# Patient Record
Sex: FEMALE | Race: WHITE | NOT HISPANIC OR LATINO | Employment: OTHER | URBAN - METROPOLITAN AREA
[De-identification: names, ages, dates, MRNs, and addresses within clinical notes are randomized per-mention and may not be internally consistent; named-entity substitution may affect disease eponyms.]

---

## 2023-03-08 LAB
C REACTIVE PROTEIN (MG/L) IN SER/PLAS BY HIGH SENSIT: 0.3 MG/L
CALCIDIOL (25 OH VITAMIN D3) (NG/ML) IN SER/PLAS: 66 NG/ML
COBALAMIN (VITAMIN B12) (PG/ML) IN SER/PLAS: 254 PG/ML (ref 211–911)
FOLATE (NG/ML) IN SER/PLAS: 11.5 NG/ML
MAGNESIUM (MG/DL) IN SER/PLAS: 1.76 MG/DL (ref 1.6–2.4)
SEDIMENTATION RATE, ERYTHROCYTE: 4 MM/H (ref 0–30)

## 2023-04-07 ENCOUNTER — APPOINTMENT (OUTPATIENT)
Dept: LAB | Facility: LAB | Age: 63
End: 2023-04-07
Payer: MEDICAID

## 2023-04-07 LAB
ALANINE AMINOTRANSFERASE (SGPT) (U/L) IN SER/PLAS: 27 U/L (ref 7–45)
ALBUMIN (G/DL) IN SER/PLAS: 3.5 G/DL (ref 3.4–5)
ALKALINE PHOSPHATASE (U/L) IN SER/PLAS: 63 U/L (ref 33–136)
ANION GAP IN SER/PLAS: 12 MMOL/L (ref 10–20)
ASPARTATE AMINOTRANSFERASE (SGOT) (U/L) IN SER/PLAS: 24 U/L (ref 9–39)
BILIRUBIN TOTAL (MG/DL) IN SER/PLAS: 0.6 MG/DL (ref 0–1.2)
CALCIUM (MG/DL) IN SER/PLAS: 9.4 MG/DL (ref 8.6–10.6)
CARBON DIOXIDE, TOTAL (MMOL/L) IN SER/PLAS: 31 MMOL/L (ref 21–32)
CHLORIDE (MMOL/L) IN SER/PLAS: 103 MMOL/L (ref 98–107)
CHOLESTEROL (MG/DL) IN SER/PLAS: 147 MG/DL (ref 0–199)
CHOLESTEROL IN HDL (MG/DL) IN SER/PLAS: 65.4 MG/DL
CHOLESTEROL/HDL RATIO: 2.2
CREATININE (MG/DL) IN SER/PLAS: 0.75 MG/DL (ref 0.5–1.05)
ERYTHROCYTE DISTRIBUTION WIDTH (RATIO) BY AUTOMATED COUNT: 13.2 % (ref 11.5–14.5)
ERYTHROCYTE MEAN CORPUSCULAR HEMOGLOBIN CONCENTRATION (G/DL) BY AUTOMATED: 31.3 G/DL (ref 32–36)
ERYTHROCYTE MEAN CORPUSCULAR VOLUME (FL) BY AUTOMATED COUNT: 95 FL (ref 80–100)
ERYTHROCYTES (10*6/UL) IN BLOOD BY AUTOMATED COUNT: 4.88 X10E12/L (ref 4–5.2)
ESTIMATED AVERAGE GLUCOSE FOR HBA1C: 108 MG/DL
GFR FEMALE: 90 ML/MIN/1.73M2
GLUCOSE (MG/DL) IN SER/PLAS: 95 MG/DL (ref 74–99)
HEMATOCRIT (%) IN BLOOD BY AUTOMATED COUNT: 46.6 % (ref 36–46)
HEMOGLOBIN (G/DL) IN BLOOD: 14.6 G/DL (ref 12–16)
HEMOGLOBIN A1C/HEMOGLOBIN TOTAL IN BLOOD: 5.4 %
LDL: 62 MG/DL (ref 0–99)
LEUKOCYTES (10*3/UL) IN BLOOD BY AUTOMATED COUNT: 5 X10E9/L (ref 4.4–11.3)
NRBC (PER 100 WBCS) BY AUTOMATED COUNT: 0 /100 WBC (ref 0–0)
PLATELETS (10*3/UL) IN BLOOD AUTOMATED COUNT: 226 X10E9/L (ref 150–450)
POTASSIUM (MMOL/L) IN SER/PLAS: 4.8 MMOL/L (ref 3.5–5.3)
PROTEIN TOTAL: 5.5 G/DL (ref 6.4–8.2)
SODIUM (MMOL/L) IN SER/PLAS: 141 MMOL/L (ref 136–145)
TRIGLYCERIDE (MG/DL) IN SER/PLAS: 97 MG/DL (ref 0–149)
UREA NITROGEN (MG/DL) IN SER/PLAS: 6 MG/DL (ref 6–23)
VLDL: 19 MG/DL (ref 0–40)

## 2023-05-01 ENCOUNTER — HOSPITAL ENCOUNTER (OUTPATIENT)
Dept: DATA CONVERSION | Facility: HOSPITAL | Age: 63
End: 2023-05-01
Attending: ANESTHESIOLOGY | Admitting: ANESTHESIOLOGY
Payer: MEDICAID

## 2023-05-01 DIAGNOSIS — I10 ESSENTIAL (PRIMARY) HYPERTENSION: ICD-10-CM

## 2023-05-01 DIAGNOSIS — M25.552 PAIN IN LEFT HIP: ICD-10-CM

## 2023-05-01 DIAGNOSIS — Z88.2 ALLERGY STATUS TO SULFONAMIDES: ICD-10-CM

## 2023-05-01 DIAGNOSIS — E78.5 HYPERLIPIDEMIA, UNSPECIFIED: ICD-10-CM

## 2023-05-01 DIAGNOSIS — M16.12 UNILATERAL PRIMARY OSTEOARTHRITIS, LEFT HIP: ICD-10-CM

## 2023-06-26 ENCOUNTER — HOSPITAL ENCOUNTER (OUTPATIENT)
Dept: DATA CONVERSION | Facility: HOSPITAL | Age: 63
End: 2023-06-26
Attending: ANESTHESIOLOGY
Payer: MEDICAID

## 2023-06-26 DIAGNOSIS — G89.29 OTHER CHRONIC PAIN: ICD-10-CM

## 2023-06-26 DIAGNOSIS — M48.061 SPINAL STENOSIS, LUMBAR REGION WITHOUT NEUROGENIC CLAUDICATION: ICD-10-CM

## 2023-06-26 DIAGNOSIS — M51.16 INTERVERTEBRAL DISC DISORDERS WITH RADICULOPATHY, LUMBAR REGION: ICD-10-CM

## 2023-06-26 DIAGNOSIS — M54.16 RADICULOPATHY, LUMBAR REGION: ICD-10-CM

## 2023-06-26 DIAGNOSIS — M54.9 DORSALGIA, UNSPECIFIED: ICD-10-CM

## 2023-09-21 RX ORDER — VIT C/E/ZN/COPPR/LUTEIN/ZEAXAN 250MG-90MG
CAPSULE ORAL
COMMUNITY
End: 2023-12-05 | Stop reason: ALTCHOICE

## 2023-09-21 RX ORDER — ASPIRIN 81 MG/1
TABLET ORAL
COMMUNITY
Start: 2022-11-29

## 2023-09-21 RX ORDER — ATORVASTATIN CALCIUM 80 MG/1
80 TABLET, FILM COATED ORAL NIGHTLY
COMMUNITY

## 2023-09-21 RX ORDER — CHLORHEXIDINE GLUCONATE 4 %
LIQUID (ML) TOPICAL
COMMUNITY
Start: 2022-10-31 | End: 2023-12-05 | Stop reason: ALTCHOICE

## 2023-09-21 RX ORDER — CHOLECALCIFEROL (VITAMIN D3) 50 MCG
TABLET ORAL
COMMUNITY

## 2023-09-21 RX ORDER — ACETAMINOPHEN 325 MG/1
TABLET ORAL
COMMUNITY
Start: 2022-11-29 | End: 2023-12-05 | Stop reason: ALTCHOICE

## 2023-09-21 RX ORDER — CHLORHEXIDINE GLUCONATE ORAL RINSE 1.2 MG/ML
SOLUTION DENTAL
COMMUNITY
Start: 2022-10-31 | End: 2023-12-05 | Stop reason: ALTCHOICE

## 2023-10-02 NOTE — OP NOTE
Post Operative Note:     Post-Procedure Diagnosis: Lumbar spinal stenosis,  disc disease, radiculitis   Procedure: 1.   2.   3.   4.   5.   Surgeon: Catrachito   Resident/Fellow/Other Assistant: Alexandro   Estimated Blood Loss (mL): none   Specimen: no   Findings: None     Operative Report Dictated:  Dictation: not applicable - note contains Operative  Report   Operative Report:    Preoperative diagnosis:  Lumbar radiculitis  Postoperative diagnosis:  Lumbar radiculitis, stenosis, disc disease  Procedure: Bilateral L4 lumbar transforaminal epidural steroid injection under fluoroscopic guidance  Surgeon: Miriam Winston  Assistant:  Fellow  Anesthesia: Local, IV sedation  Complications: Apparently none    Clinical note: Cynthia is a 62-year-old female with a history of back and leg symptoms.  She is here today for the aforementioned procedure.  We updated her MRI which does show rather significant  narrowing at L4-5.  She is here today for more targeted injection.    Procedure note: The patient was met in the preoperative holding area after risks benefits and alternatives to procedure were discussed with the patient, informed consent was obtained. Patient brought back to the procedure room and placed in the prone  position on the fluoroscopy table. Area over the back was exposed, prepped, draped, in the usual sterile fashion.  Skin and subcutaneous tissues to the neuroforamen was anesthetized using 0.5% lidocaine.  22-gauge Sprotte needles were inserted in the  skin and advanced into the foramen. Needle tip position was confirmed in AP oblique and lateral view.  Contrast was injected which showed appropriate epidural spread, no intravascular or intrathecal uptake. A total of 2 mL of 0.5% lidocaine mixed with  10 mg dexamethasone was injected in divided doses among the 2 needles. Needles removed, bandage applied, patient tolerated the procedure well with no immediate complications.      Attestation:   Note  Completion:  Attending Attestation I was present for the entire procedure         Electronic Signatures:  Miriam Winston)  (Signed 26-Jun-2023 08:56)   Authored: Post Operative Note, Note Completion      Last Updated: 26-Jun-2023 08:56 by Miriam Winston)

## 2023-10-02 NOTE — OP NOTE
Post Operative Note:     Post-Procedure Diagnosis: Left hip osteoarthritis   Procedure: 1.   2.   3.   4.   5.   Surgeon: Catrachito   Resident/Fellow/Other Assistant: Pascale   Estimated Blood Loss (mL): none   Specimen: no   Findings: None     Operative Report Dictated:  Dictation: not applicable - note contains Operative  Report   Operative Report:    Preoperative diagnosis:  Hip pain/OA  Postoperative diagnosis: Hip pain/OA  Procedure: Left-sided hip joint injection under fluoroscopic guidance  Surgeon: Miriam Winston  Assistant:  Fellow  Anesthesia: Local, IV sedation 2 mg  Complications: Apparently none    Clinical note: Cynthia is a 62-year-old female with a history of left-sided hip pain who presents today for the aforementioned procedure.    Procedure note: The patient was met in the preoperative holding area after risks benefits and alternatives to procedure were discussed with the patient, informed consent was obtained. Patient brought back to the procedure room and placed in the lateral  decubitus position on the fluoroscopy table. Area over the hip joint was exposed, prepped, draped, in the usual sterile fashion.  Skin and subcutaneous tissues to the joint was anesthetized using 0.5% lidocaine.  23-gauge spinal needle was inserted in  the skin and advanced.  Contrast was injected which showed appropriate spread, no intravascular uptake. A total of 3 mL of bupivacaine mixed with 40 mg of methylprednisolone. Needle removed, bandage applied, patient tolerated the procedure well with no  immediate complications.      Attestation:   Note Completion:  Attending Attestation I was present for the entire procedure         Electronic Signatures:  Miriam Winston)  (Signed 01-May-2023 09:59)   Authored: Post Operative Note, Note Completion      Last Updated: 01-May-2023 09:59 by Miriam Winston)

## 2023-10-18 DIAGNOSIS — M06.9 RHEUMATOID ARTHRITIS INVOLVING MULTIPLE SITES, UNSPECIFIED WHETHER RHEUMATOID FACTOR PRESENT (MULTI): Primary | ICD-10-CM

## 2023-10-18 RX ORDER — LEFLUNOMIDE 20 MG/1
TABLET ORAL
COMMUNITY
Start: 2022-11-29 | End: 2023-10-18 | Stop reason: SDUPTHER

## 2023-10-19 RX ORDER — LEFLUNOMIDE 20 MG/1
TABLET ORAL
Qty: 90 TABLET | Refills: 2 | Status: SHIPPED | OUTPATIENT
Start: 2023-10-19

## 2023-10-20 PROBLEM — M47.816 LUMBAR SPONDYLOSIS: Status: ACTIVE | Noted: 2023-10-20

## 2023-10-20 PROBLEM — H57.813 BROW PTOSIS, BILATERAL: Status: ACTIVE | Noted: 2023-10-20

## 2023-10-20 PROBLEM — S92.909A: Status: ACTIVE | Noted: 2023-10-20

## 2023-10-20 PROBLEM — K21.9 GERD (GASTROESOPHAGEAL REFLUX DISEASE): Status: ACTIVE | Noted: 2023-10-20

## 2023-10-20 PROBLEM — R76.8 POSITIVE SM/RNP ANTIBODY: Status: ACTIVE | Noted: 2023-10-20

## 2023-10-20 PROBLEM — H52.13 MYOPIA, BILATERAL: Status: ACTIVE | Noted: 2023-10-20

## 2023-10-20 PROBLEM — D22.9 MULTIPLE NEVI: Status: ACTIVE | Noted: 2023-10-20

## 2023-10-20 PROBLEM — M35.00 SICCA SYNDROME (MULTI): Status: ACTIVE | Noted: 2023-10-20

## 2023-10-20 PROBLEM — N92.6 IRREGULAR MENSES: Status: ACTIVE | Noted: 2023-10-20

## 2023-10-20 PROBLEM — M35.00 SJOGRENS SYNDROME (MULTI): Status: ACTIVE | Noted: 2022-11-17

## 2023-10-20 PROBLEM — G89.29 CHRONIC BACK PAIN: Status: ACTIVE | Noted: 2023-10-20

## 2023-10-20 PROBLEM — D22.5 MELANOCYTIC NEVI OF TRUNK: Status: ACTIVE | Noted: 2019-03-12

## 2023-10-20 PROBLEM — M62.838 MUSCLE SPASM: Status: ACTIVE | Noted: 2023-10-20

## 2023-10-20 PROBLEM — D22.39 MELANOCYTIC NEVI OF OTHER PARTS OF FACE: Status: ACTIVE | Noted: 2019-03-12

## 2023-10-20 PROBLEM — M21.41 ACQUIRED PES PLANUS OF RIGHT FOOT: Status: ACTIVE | Noted: 2023-10-20

## 2023-10-20 PROBLEM — E78.2 MIXED HYPERLIPIDEMIA: Status: ACTIVE | Noted: 2023-10-20

## 2023-10-20 PROBLEM — G47.00 INSOMNIA: Status: ACTIVE | Noted: 2023-10-20

## 2023-10-20 PROBLEM — H52.203 ASTIGMATISM OF BOTH EYES: Status: ACTIVE | Noted: 2023-10-20

## 2023-10-20 PROBLEM — S82.843A BIMALLEOLAR FRACTURE: Status: ACTIVE | Noted: 2023-10-20

## 2023-10-20 PROBLEM — I25.10 CORONARY ARTERIOSCLEROSIS IN NATIVE ARTERY: Status: ACTIVE | Noted: 2023-10-20

## 2023-10-20 PROBLEM — R07.9 CHEST PAIN: Status: ACTIVE | Noted: 2023-10-20

## 2023-10-20 PROBLEM — H52.4 HYPEROPIA WITH PRESBYOPIA OF BOTH EYES: Status: ACTIVE | Noted: 2023-10-20

## 2023-10-20 PROBLEM — H90.11 CONDUCTIVE HEARING LOSS OF RIGHT EAR WITH UNRESTRICTED HEARING OF LEFT EAR: Status: ACTIVE | Noted: 2023-10-20

## 2023-10-20 PROBLEM — I21.9 MYOCARDIAL INFARCT (MULTI): Status: ACTIVE | Noted: 2022-11-17

## 2023-10-20 PROBLEM — M35.89 MIXED COLLAGEN VASCULAR DISEASE (MULTI): Status: ACTIVE | Noted: 2023-10-20

## 2023-10-20 PROBLEM — M48.07 LUMBOSACRAL STENOSIS: Status: ACTIVE | Noted: 2023-10-20

## 2023-10-20 PROBLEM — M06.9 RHEUMATOID ARTHRITIS (MULTI): Status: ACTIVE | Noted: 2022-11-17

## 2023-10-20 PROBLEM — R21 RASH AND OTHER NONSPECIFIC SKIN ERUPTION: Status: ACTIVE | Noted: 2019-03-12

## 2023-10-20 PROBLEM — D49.2 NEOPLASM OF UNSPECIFIED BEHAVIOR OF BONE, SOFT TISSUE, AND SKIN: Status: ACTIVE | Noted: 2019-03-12

## 2023-10-20 PROBLEM — Z95.5 HISTORY OF HEART ARTERY STENT: Status: ACTIVE | Noted: 2023-10-20

## 2023-10-20 PROBLEM — H04.123 DRY EYE SYNDROME OF BILATERAL LACRIMAL GLANDS: Status: ACTIVE | Noted: 2023-10-20

## 2023-10-20 PROBLEM — E78.5 HYPERLIPIDEMIA: Status: ACTIVE | Noted: 2023-10-20

## 2023-10-20 PROBLEM — M43.10 ACQUIRED SPONDYLOLISTHESIS: Status: ACTIVE | Noted: 2023-10-20

## 2023-10-20 PROBLEM — I21.4 NSTEMI (NON-ST ELEVATED MYOCARDIAL INFARCTION) (MULTI): Status: ACTIVE | Noted: 2023-10-20

## 2023-10-20 PROBLEM — R30.0 DYSURIA: Status: ACTIVE | Noted: 2023-10-20

## 2023-10-20 PROBLEM — M77.02 MEDIAL EPICONDYLITIS OF LEFT ELBOW: Status: ACTIVE | Noted: 2023-10-20

## 2023-10-20 PROBLEM — M16.12 ARTHRITIS OF LEFT HIP: Status: ACTIVE | Noted: 2023-10-20

## 2023-10-20 PROBLEM — H65.90 FLUID COLLECTION OF MIDDLE EAR: Status: ACTIVE | Noted: 2023-10-20

## 2023-10-20 PROBLEM — H52.03 HYPEROPIA WITH PRESBYOPIA OF BOTH EYES: Status: ACTIVE | Noted: 2023-10-20

## 2023-10-20 PROBLEM — R19.5 DARK STOOLS: Status: ACTIVE | Noted: 2023-10-20

## 2023-10-20 PROBLEM — E80.1: Status: ACTIVE | Noted: 2023-10-20

## 2023-10-20 PROBLEM — H02.831 DERMATOCHALASIS OF RIGHT UPPER EYELID: Status: ACTIVE | Noted: 2023-10-20

## 2023-10-20 PROBLEM — M54.17 LUMBOSACRAL NEURITIS: Status: ACTIVE | Noted: 2023-10-20

## 2023-10-20 PROBLEM — R35.0 URINARY FREQUENCY: Status: ACTIVE | Noted: 2023-10-20

## 2023-10-20 PROBLEM — G44.309 POST-TRAUMATIC HEADACHE, NOT INTRACTABLE: Status: ACTIVE | Noted: 2023-10-20

## 2023-10-20 PROBLEM — M54.16 LUMBAR RADICULOPATHY: Status: ACTIVE | Noted: 2022-11-17

## 2023-10-20 PROBLEM — T78.1XXA ALLERGIC REACTION TO FOOD: Status: ACTIVE | Noted: 2023-10-20

## 2023-10-20 PROBLEM — M54.9 CHRONIC BACK PAIN: Status: ACTIVE | Noted: 2023-10-20

## 2023-10-20 PROBLEM — L82.1 OTHER SEBORRHEIC KERATOSIS: Status: ACTIVE | Noted: 2019-03-12

## 2023-10-20 PROBLEM — M35.89 OTHER SPECIFIED SYSTEMIC INVOLVEMENT OF CONNECTIVE TISSUE (MULTI): Status: ACTIVE | Noted: 2023-10-20

## 2023-10-20 PROBLEM — H93.11 SUBJECTIVE TINNITUS, RIGHT: Status: ACTIVE | Noted: 2023-10-20

## 2023-10-20 PROBLEM — H54.7 DECREASED VISUAL ACUITY: Status: ACTIVE | Noted: 2023-10-20

## 2023-10-20 PROBLEM — D18.01 HEMANGIOMA OF SKIN AND SUBCUTANEOUS TISSUE: Status: ACTIVE | Noted: 2019-03-12

## 2023-10-20 PROBLEM — B07.0 PLANTAR WART OF RIGHT FOOT: Status: ACTIVE | Noted: 2023-10-20

## 2023-10-20 PROBLEM — S82.892A ANKLE FRACTURE, LEFT: Status: ACTIVE | Noted: 2023-10-20

## 2023-10-20 PROBLEM — E55.9 VITAMIN D DEFICIENCY: Status: ACTIVE | Noted: 2023-10-20

## 2023-10-20 PROBLEM — M79.7 FIBROMYALGIA: Status: ACTIVE | Noted: 2022-11-17

## 2023-10-20 PROBLEM — J30.2 SEASONAL ALLERGIES: Status: ACTIVE | Noted: 2023-10-20

## 2023-10-20 PROBLEM — H72.91 OTITIS MEDIA OF RIGHT EAR WITH RUPTURE OF TYMPANIC MEMBRANE: Status: ACTIVE | Noted: 2023-10-20

## 2023-10-20 PROBLEM — L91.8 OTHER HYPERTROPHIC DISORDERS OF THE SKIN: Status: ACTIVE | Noted: 2019-03-12

## 2023-10-20 PROBLEM — H02.834 DERMATOCHALASIS OF LEFT UPPER EYELID: Status: ACTIVE | Noted: 2023-10-20

## 2023-10-20 PROBLEM — R79.89 LOW VITAMIN D LEVEL: Status: ACTIVE | Noted: 2023-10-20

## 2023-10-20 PROBLEM — M89.319 CLAVICLE ENLARGEMENT: Status: ACTIVE | Noted: 2023-10-20

## 2023-10-20 PROBLEM — H66.91 OTITIS MEDIA OF RIGHT EAR WITH RUPTURE OF TYMPANIC MEMBRANE: Status: ACTIVE | Noted: 2023-10-20

## 2023-10-20 PROBLEM — M19.079 ARTHRITIS OF MIDFOOT: Status: ACTIVE | Noted: 2023-10-20

## 2023-10-20 PROBLEM — R10.11 RIGHT UPPER QUADRANT ABDOMINAL PAIN OF UNKNOWN ETIOLOGY: Status: ACTIVE | Noted: 2023-10-20

## 2023-10-20 PROBLEM — G89.4 CHRONIC PAIN SYNDROME: Status: ACTIVE | Noted: 2023-10-20

## 2023-10-20 PROBLEM — H02.403 PTOSIS OF BOTH EYELIDS: Status: ACTIVE | Noted: 2023-10-20

## 2023-10-20 PROBLEM — I10 HYPERTENSION: Status: ACTIVE | Noted: 2023-10-20

## 2023-10-20 PROBLEM — Z86.69 HISTORY OF ACUTE OTITIS EXTERNA: Status: ACTIVE | Noted: 2023-10-20

## 2023-10-20 PROBLEM — M54.12 CERVICAL RADICULITIS: Status: ACTIVE | Noted: 2023-10-20

## 2023-10-20 RX ORDER — EPINEPHRINE 0.3 MG/.3ML
0.3 INJECTION SUBCUTANEOUS
COMMUNITY
Start: 2018-03-05 | End: 2024-02-02 | Stop reason: SDUPTHER

## 2023-10-20 RX ORDER — METOPROLOL SUCCINATE 50 MG/1
1 TABLET, EXTENDED RELEASE ORAL DAILY
COMMUNITY
Start: 2019-08-14 | End: 2024-02-26 | Stop reason: SDUPTHER

## 2023-10-20 RX ORDER — LORATADINE 10 MG/1
10 TABLET ORAL DAILY
COMMUNITY
End: 2023-12-05 | Stop reason: ALTCHOICE

## 2023-10-20 RX ORDER — POLYVINYL ALCOHOL 14 MG/ML
1-2 SOLUTION/ DROPS OPHTHALMIC 4 TIMES DAILY
COMMUNITY
Start: 2018-04-11

## 2023-10-20 RX ORDER — TRAZODONE HYDROCHLORIDE 100 MG/1
1-3 TABLET ORAL NIGHTLY
COMMUNITY
End: 2023-12-05 | Stop reason: ALTCHOICE

## 2023-10-20 RX ORDER — DIPHENHYDRAMINE HCL 25 MG
50 TABLET ORAL
COMMUNITY
Start: 2018-02-21 | End: 2023-12-05 | Stop reason: ALTCHOICE

## 2023-10-20 RX ORDER — HYDROCHLOROTHIAZIDE 25 MG/1
25 TABLET ORAL
COMMUNITY
End: 2023-12-05 | Stop reason: ALTCHOICE

## 2023-10-20 RX ORDER — METOPROLOL SUCCINATE 25 MG/1
25 TABLET, EXTENDED RELEASE ORAL
COMMUNITY
End: 2023-12-05 | Stop reason: ALTCHOICE

## 2023-10-20 RX ORDER — PILOCARPINE HYDROCHLORIDE 5 MG/1
1 TABLET, FILM COATED ORAL 2 TIMES DAILY
COMMUNITY
Start: 2016-10-18

## 2023-10-20 RX ORDER — NITROGLYCERIN 0.3 MG/1
TABLET SUBLINGUAL
COMMUNITY
Start: 2019-05-15 | End: 2023-12-05 | Stop reason: ALTCHOICE

## 2023-10-20 RX ORDER — OXYCODONE HYDROCHLORIDE 5 MG/1
5 TABLET ORAL EVERY 6 HOURS PRN
COMMUNITY
Start: 2022-11-28 | End: 2023-12-05 | Stop reason: ALTCHOICE

## 2023-10-20 RX ORDER — PREDNISONE 50 MG/1
50 TABLET ORAL DAILY
COMMUNITY
Start: 2023-09-06 | End: 2023-11-15

## 2023-10-20 RX ORDER — NABUMETONE 500 MG/1
500 TABLET, FILM COATED ORAL 3 TIMES DAILY
COMMUNITY
End: 2023-12-05 | Stop reason: ALTCHOICE

## 2023-10-20 RX ORDER — DOCUSATE SODIUM 50 MG AND SENNOSIDES 8.6 MG 8.6; 5 MG/1; MG/1
2 TABLET, FILM COATED ORAL NIGHTLY
COMMUNITY
Start: 2022-11-28 | End: 2023-12-05 | Stop reason: ALTCHOICE

## 2023-10-20 RX ORDER — FLUOROMETHOLONE 1 MG/ML
1 SUSPENSION/ DROPS OPHTHALMIC 2 TIMES DAILY
COMMUNITY
Start: 2023-04-03

## 2023-10-20 RX ORDER — TRAMADOL HYDROCHLORIDE 50 MG/1
50 TABLET ORAL
COMMUNITY
End: 2023-11-09 | Stop reason: SDUPTHER

## 2023-10-20 RX ORDER — POLYETHYLENE GLYCOL 3350, SODIUM SULFATE ANHYDROUS, SODIUM BICARBONATE, SODIUM CHLORIDE, POTASSIUM CHLORIDE 236; 22.74; 6.74; 5.86; 2.97 G/4L; G/4L; G/4L; G/4L; G/4L
POWDER, FOR SOLUTION ORAL
COMMUNITY
Start: 2022-03-02 | End: 2023-12-05 | Stop reason: ALTCHOICE

## 2023-10-20 RX ORDER — CIPROFLOXACIN 500 MG/1
1 TABLET ORAL EVERY 12 HOURS
COMMUNITY
Start: 2022-10-21 | End: 2023-12-05 | Stop reason: ALTCHOICE

## 2023-10-20 RX ORDER — DULOXETIN HYDROCHLORIDE 60 MG/1
1 CAPSULE, DELAYED RELEASE ORAL 2 TIMES DAILY
COMMUNITY
Start: 2016-09-09 | End: 2024-01-22

## 2023-10-20 RX ORDER — LIFITEGRAST 50 MG/ML
1 SOLUTION/ DROPS OPHTHALMIC 2 TIMES DAILY
COMMUNITY
End: 2023-12-05 | Stop reason: ALTCHOICE

## 2023-10-20 RX ORDER — DOXYCYCLINE 100 MG/1
100 TABLET ORAL 2 TIMES DAILY
COMMUNITY
Start: 2023-09-06 | End: 2023-12-05 | Stop reason: ALTCHOICE

## 2023-10-20 RX ORDER — LEFLUNOMIDE 10 MG/1
20 TABLET ORAL DAILY
COMMUNITY
Start: 2019-01-14 | End: 2023-12-05 | Stop reason: ALTCHOICE

## 2023-10-20 RX ORDER — CLOPIDOGREL BISULFATE 75 MG/1
75 TABLET ORAL
COMMUNITY
End: 2023-12-05 | Stop reason: ALTCHOICE

## 2023-10-20 RX ORDER — PSEUDOEPHEDRINE HCL 30 MG
1 TABLET ORAL EVERY 12 HOURS PRN
COMMUNITY
Start: 2023-06-02 | End: 2023-12-05 | Stop reason: ALTCHOICE

## 2023-10-20 RX ORDER — OMEPRAZOLE 20 MG/1
20 CAPSULE, DELAYED RELEASE ORAL 2 TIMES DAILY
COMMUNITY
End: 2023-12-05 | Stop reason: ALTCHOICE

## 2023-10-20 RX ORDER — PREGABALIN 100 MG/1
1 CAPSULE ORAL 2 TIMES DAILY
COMMUNITY
Start: 2019-08-13 | End: 2023-12-22 | Stop reason: SDUPTHER

## 2023-10-20 RX ORDER — GABAPENTIN 600 MG/1
600 TABLET ORAL 3 TIMES DAILY
COMMUNITY
End: 2023-12-05 | Stop reason: ALTCHOICE

## 2023-10-20 RX ORDER — OMEPRAZOLE 40 MG/1
40 CAPSULE, DELAYED RELEASE ORAL DAILY
COMMUNITY

## 2023-10-20 RX ORDER — DESLORATADINE 5 MG/1
5 TABLET ORAL DAILY PRN
COMMUNITY
Start: 2021-06-11

## 2023-10-20 RX ORDER — CYCLOBENZAPRINE HCL 10 MG
10 TABLET ORAL 3 TIMES DAILY PRN
COMMUNITY
End: 2023-12-05 | Stop reason: ALTCHOICE

## 2023-10-20 RX ORDER — HYDROXYCHLOROQUINE SULFATE 200 MG/1
TABLET, FILM COATED ORAL
COMMUNITY
End: 2023-12-05 | Stop reason: ALTCHOICE

## 2023-10-23 ENCOUNTER — OFFICE VISIT (OUTPATIENT)
Dept: ORTHOPEDIC SURGERY | Facility: HOSPITAL | Age: 63
End: 2023-10-23
Payer: MEDICAID

## 2023-10-23 DIAGNOSIS — M17.11 ARTHRITIS OF RIGHT KNEE: ICD-10-CM

## 2023-10-23 PROCEDURE — 2500000005 HC RX 250 GENERAL PHARMACY W/O HCPCS: Performed by: EMERGENCY MEDICINE

## 2023-10-23 PROCEDURE — 2500000004 HC RX 250 GENERAL PHARMACY W/ HCPCS (ALT 636 FOR OP/ED): Performed by: EMERGENCY MEDICINE

## 2023-10-23 PROCEDURE — 99214 OFFICE O/P EST MOD 30 MIN: CPT | Performed by: EMERGENCY MEDICINE

## 2023-10-23 PROCEDURE — 20610 DRAIN/INJ JOINT/BURSA W/O US: CPT | Mod: RT,GC | Performed by: STUDENT IN AN ORGANIZED HEALTH CARE EDUCATION/TRAINING PROGRAM

## 2023-10-23 PROCEDURE — 99214 OFFICE O/P EST MOD 30 MIN: CPT | Mod: 25 | Performed by: EMERGENCY MEDICINE

## 2023-10-23 RX ADMIN — LIDOCAINE HYDROCHLORIDE 4 ML: 10 INJECTION, SOLUTION INFILTRATION; PERINEURAL at 23:03

## 2023-10-23 RX ADMIN — TRIAMCINOLONE ACETONIDE 80 MG: 40 INJECTION, SUSPENSION INTRA-ARTICULAR; INTRAMUSCULAR at 23:03

## 2023-10-23 ASSESSMENT — ENCOUNTER SYMPTOMS: KNEE SWELLING: 1

## 2023-10-24 NOTE — PROGRESS NOTES
Subjective   Cynthia Cantrell is a 63 y.o. female who presents for Pain of the Right Knee    Right Knee       63-year-old female who has previously seen for left hip pain and right AC joint arthritis presents with new complaint of right knee pain.  States that right knee has been bothering her since she had a fall on Labor Day.  States that she got out of her wheelchair and went to turn, and her foot got stuck and she suffered a valgus injury to the right knee and struck the medial aspect of the right knee onto the ground.  Had immediate pain and the following day went to the ER.  At the ER, x-rays were obtained and demonstrated moderate osteoarthritis with no acute fracture.  She was told to follow-up with orthopedics if her pain did not improve.  She has never had any prior injuries or injections to the right knee.  She is taking tramadol as prescribed by another physician.    ROS: All pertinent positive symptoms are included in the history of present illness.    All other systems have been reviewed and are negative and noncontributory to this patient's current ailments.    Objective     Physical Exam  GENERAL:  Awake, alert, and oriented, no apparent distress, pleasant, and cooperative  PSYC: Mood is euthymic, affect is congruent  EAR, NOSE, THROAT:  Normocephalic, atraumatic, moist membranes, anicteric sclera  LUNG: Nonlabored breathing  HEART: No clubbing or cyanosis  SKIN: No increased erythema, warmth, rashes, or concerning skin lesions  GAIT: Wheelchair assisted  MUSCULOSKELETAL:  The RIGHT knee has trace to grade 1 joint effusion. Patella crepitus and grind are positive. There is minimal tenderness to the medial and lateral joint lines. Flexion and extension are without mechanical blocking. There is no instability with stress testing.    IMAGING:  Radiographs of right knee obtained on 9/5/2023 demonstrate moderate osteoarthritis as demonstrated on nonweightbearing images     PROCEDURES  Patient ID:  Cynthia Cantrell is a 63 y.o. female.    L Inj/Asp: R knee on 10/23/2023 11:03 PM  Indications: pain  Details: 25 G needle, anteromedial approach  Medications: 80 mg triamcinolone acetonide 40 mg/mL; 4 mL lidocaine 10 mg/mL (1 %)  Outcome: tolerated well, no immediate complications  Procedure, treatment alternatives, risks and benefits explained, specific risks discussed. Consent was given by the patient.           Assessment/Plan   Problem List Items Addressed This Visit    None  Visit Diagnoses       Arthritis of right knee        Relevant Orders    Point of Care Ultrasound (Completed)    L Inj/Asp: R knee        We discussed the imaging and examination findings together in office today.  There is no evidence of acute fracture or dislocation on x-rays obtained 9/5/2023.  Symptoms are consistent with a flare of right knee osteoarthritis.  Discussed treatment options, including corticosteroid injection, which patient elected to proceed with today.  Discussed risks versus benefits of having injections performed. Patient has elected to proceed with procedures. Please refer to procedure notes above. Discussed with patient to limit weightbearing activities over the next 24-48 hours, they can then progress to full activities as tolerated. Discussed with patient to avoid water submersion over the next 2 days. Discussed with patient to call me immediately if they develop worsening pain, rash, erythema, or fevers.  She will follow-up as needed.

## 2023-10-25 RX ORDER — TRIAMCINOLONE ACETONIDE 40 MG/ML
80 INJECTION, SUSPENSION INTRA-ARTICULAR; INTRAMUSCULAR
Status: COMPLETED | OUTPATIENT
Start: 2023-10-23 | End: 2023-10-23

## 2023-10-25 RX ORDER — LIDOCAINE HYDROCHLORIDE 10 MG/ML
4 INJECTION INFILTRATION; PERINEURAL
Status: COMPLETED | OUTPATIENT
Start: 2023-10-23 | End: 2023-10-23

## 2023-10-26 ENCOUNTER — OFFICE VISIT (OUTPATIENT)
Dept: PAIN MEDICINE | Facility: HOSPITAL | Age: 63
End: 2023-10-26
Payer: MEDICAID

## 2023-10-26 DIAGNOSIS — M54.16 LUMBAR RADICULOPATHY: ICD-10-CM

## 2023-10-26 DIAGNOSIS — M25.552 PAIN OF LEFT HIP: ICD-10-CM

## 2023-10-26 PROCEDURE — 3074F SYST BP LT 130 MM HG: CPT | Performed by: ANESTHESIOLOGY

## 2023-10-26 PROCEDURE — 3080F DIAST BP >= 90 MM HG: CPT | Performed by: ANESTHESIOLOGY

## 2023-10-26 PROCEDURE — 99214 OFFICE O/P EST MOD 30 MIN: CPT | Performed by: ANESTHESIOLOGY

## 2023-10-26 ASSESSMENT — PAIN SCALES - GENERAL: PAINLEVEL: 7

## 2023-10-26 NOTE — PROGRESS NOTES
Chief Complaint   Patient presents with    Back Pain     Subjective   Patient ID: Cynthia Cantrell is a 63 y.o. female.    HPI    Patient is a 63-year-old female who presents a follow-up visit in pain clinic today for back pain and bilateral radicular pain.  Patient was last seen in the clinic on 6/26/2023, and underwent bilateral L4 transforaminal epidural steroid injections.  Patient also has a history of L3/L5 laminectomy with Dr. Raymundo in November 2022.  Patient states since her last injection, she has noticed resolution of the radiculopathy on the right side, however does have lingering radiculopathy down the left leg.  She typically cannot walk more than across the juan due to her leg pain, and therefore spends most of her activity in her wheelchair.  Takes Lyrica and tramadol with improvement of her symptoms.    Review of Systems  A 13 point comprehensive review of system was negative except for specific complaints as listed in the HPI.    Objective   Physical Exam  PHYSICAL EXAM  Vitals signs reviewed  Constitutional:    General: In wheelchair, not in acute distress   Appearance: Normal appearance. Not ill-appearing.  HENT:   Head: Normocephalic and atraumatic  Eyes:   Conjunctiva/sclera normal  Cardiovascular:  No jugular venous distention bilaterally  No gross edema in lower extremities  Pulmonary:   Effort: No respiratory distress  Abdominal:  Abdomen appears nondistended  Musculoskeletal:   Moves all extremities equally  Leg roll exam positive on the left  Skin:   General: Skin is warm and dry  Neurological:  Straight leg test was negative bilaterally  Patellar reflexes were 2+ bowel  Seema exam was negative bilaterally  Dorsiflexion and plantarflexion 5/5 bilaterally  Psychiatric:    Mood and Affect: Mood normal    Behavior: Behavior normal    Assessment/Plan   Diagnoses and all orders for this visit:  Lumbar radiculopathy  -     Transforaminal; Future  -     FL pain management TC;  Future  Pain of left hip  -     FL guided injection hip left; Future    Patient is a 63-year-old female who presents as a follow-up visit in pain clinic today for bilateral back pain and leg pain.  MRI reviewed from 6/4/2023, most significant for disc extrusion at L4/L5 causing severe central stenosis, however does have spondylolisthesis L2 on L3 causing moderate central stenosis as well as spondylolisthesis L1 on L2 causing moderate central stenosis.  Does also have osteophytes causing severe right-sided neuroforaminal stenosis at L5/S1.  We will schedule for left-sided hip injection, and a subsequent left-sided repeat L4 transforaminal epidural steroid injection.      Also provided patient with information with regarding to spinal cord stimulation.  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    Plan:  - Schedule for left-sided intra-articular hip injection.  - We will schedule for follow-up left-sided L4 transforaminal epidural steroid injection.  - Provided patient with information regarding spinal cord stimulator.  Did discuss the procedure itself as well as risk, benefits, alternatives.  We discussed that the 3 step process for neuromodulation includes evaluation by pain psychology, a trial where she can test it out, and then the permanent implant.  -Urged to keep up with core strengthening and exercise program.      All of the aforementioned procedures including the risk, benefits, alternatives reviewed with the patient.

## 2023-11-09 DIAGNOSIS — M54.16 LUMBAR RADICULOPATHY: ICD-10-CM

## 2023-11-09 DIAGNOSIS — G89.4 CHRONIC PAIN SYNDROME: ICD-10-CM

## 2023-11-10 RX ORDER — NALOXONE HYDROCHLORIDE 4 MG/.1ML
4 SPRAY NASAL AS NEEDED
Qty: 2 EACH | Refills: 0 | Status: SHIPPED | OUTPATIENT
Start: 2023-11-10 | End: 2024-11-09

## 2023-11-10 RX ORDER — TRAMADOL HYDROCHLORIDE 50 MG/1
50 TABLET ORAL EVERY 4 HOURS PRN
Qty: 180 TABLET | Refills: 0 | Status: SHIPPED | OUTPATIENT
Start: 2023-11-10 | End: 2023-12-10

## 2023-11-15 ENCOUNTER — HOSPITAL ENCOUNTER (EMERGENCY)
Facility: HOSPITAL | Age: 63
Discharge: HOME | End: 2023-11-15
Payer: MEDICAID

## 2023-11-15 VITALS
RESPIRATION RATE: 19 BRPM | HEIGHT: 64 IN | SYSTOLIC BLOOD PRESSURE: 123 MMHG | WEIGHT: 136 LBS | BODY MASS INDEX: 23.22 KG/M2 | TEMPERATURE: 97.8 F | OXYGEN SATURATION: 94 % | HEART RATE: 92 BPM | DIASTOLIC BLOOD PRESSURE: 77 MMHG

## 2023-11-15 DIAGNOSIS — R05.1 ACUTE COUGH: Primary | ICD-10-CM

## 2023-11-15 DIAGNOSIS — J18.9 PNEUMONIA DUE TO INFECTIOUS ORGANISM, UNSPECIFIED LATERALITY, UNSPECIFIED PART OF LUNG: ICD-10-CM

## 2023-11-15 PROCEDURE — 99283 EMERGENCY DEPT VISIT LOW MDM: CPT

## 2023-11-15 PROCEDURE — 99285 EMERGENCY DEPT VISIT HI MDM: CPT

## 2023-11-15 RX ORDER — PREDNISONE 20 MG/1
20 TABLET ORAL 2 TIMES DAILY
Qty: 8 TABLET | Refills: 0 | Status: SHIPPED | OUTPATIENT
Start: 2023-11-15 | End: 2023-11-19

## 2023-11-15 RX ORDER — DOXYCYCLINE 100 MG/1
100 CAPSULE ORAL 2 TIMES DAILY
Qty: 20 CAPSULE | Refills: 0 | Status: SHIPPED | OUTPATIENT
Start: 2023-11-15 | End: 2023-11-25

## 2023-11-15 RX ORDER — ALBUTEROL SULFATE 90 UG/1
2 AEROSOL, METERED RESPIRATORY (INHALATION) EVERY 4 HOURS PRN
Qty: 18 G | Refills: 0 | Status: SHIPPED | OUTPATIENT
Start: 2023-11-15 | End: 2024-03-29

## 2023-11-15 RX ORDER — BENZONATATE 100 MG/1
100 CAPSULE ORAL 3 TIMES DAILY PRN
Qty: 20 CAPSULE | Refills: 0 | Status: SHIPPED | OUTPATIENT
Start: 2023-11-15 | End: 2023-11-22

## 2023-11-15 ASSESSMENT — PAIN - FUNCTIONAL ASSESSMENT: PAIN_FUNCTIONAL_ASSESSMENT: 0-10

## 2023-11-15 ASSESSMENT — PAIN DESCRIPTION - DESCRIPTORS: DESCRIPTORS: ACHING

## 2023-11-15 ASSESSMENT — PAIN SCALES - GENERAL: PAINLEVEL_OUTOF10: 7

## 2023-11-15 ASSESSMENT — COLUMBIA-SUICIDE SEVERITY RATING SCALE - C-SSRS
6. HAVE YOU EVER DONE ANYTHING, STARTED TO DO ANYTHING, OR PREPARED TO DO ANYTHING TO END YOUR LIFE?: NO
2. HAVE YOU ACTUALLY HAD ANY THOUGHTS OF KILLING YOURSELF?: NO
1. IN THE PAST MONTH, HAVE YOU WISHED YOU WERE DEAD OR WISHED YOU COULD GO TO SLEEP AND NOT WAKE UP?: NO

## 2023-11-15 ASSESSMENT — PAIN DESCRIPTION - PAIN TYPE: TYPE: CHRONIC PAIN

## 2023-11-16 NOTE — ED PROVIDER NOTES
HPI   Chief Complaint   Patient presents with    Flu Symptoms     Pt arrives to ED with c/o cough, weakness, fever x2 weeks. Pt states she lost 6 pounds in that time. Pt alert and oriented x4. No fever at time of triage. Pt reports taking tylenol prior to arrival.       63-year-old female with cough weakness and fever for the past 2 weeks she lost some weight because she lost her appetite she has been coughing persistently.                          No data recorded                Patient History   Past Medical History:   Diagnosis Date    Encounter for general adult medical examination without abnormal findings 10/29/2018    Encounter for preventive health examination    Fibromyalgia 11/12/2021    Fibromyalgia    Old myocardial infarction 11/16/2020    Old myocardial infarct    Other overlap syndromes (CMS/[facility]) 10/10/2016    Mixed connective tissue disease    Personal history of other diseases of the digestive system     History of esophageal reflux    Personal history of other diseases of the musculoskeletal system and connective tissue     History of arthritis    Personal history of other infectious and parasitic diseases 04/28/2016    History of herpes zoster    Rheumatoid arthritis, unspecified (CMS/[facility]) 05/16/2022    Rheumatoid arthritis    Sjogren syndrome, unspecified (CMS/[facility])     History of Sjogren's disease    Unspecified perforation of tympanic membrane, right ear 07/29/2016    Perforated tympanic membrane, right    Urinary tract infection, site not specified 10/21/2022    Acute lower UTI    Urinary tract infection, site not specified 08/20/2021    Acute lower UTI     Past Surgical History:   Procedure Laterality Date    APPENDECTOMY  09/09/2016    Appendectomy    CT ABDOMEN PELVIS ANGIOGRAM W AND/OR WO IV CONTRAST  4/11/2019    CT ABDOMEN PELVIS ANGIOGRAM W AND/OR WO IV CONTRAST 4/11/2019 [facility] CLINICAL LEGACY    OTHER SURGICAL HISTORY  09/09/2016    Mastoidectomy, Revision - With Tympanoplasty    OTHER  SURGICAL HISTORY  01/07/2021    Foot surgery     Family History   Problem Relation Name Age of Onset    Other (Cardiac abnormality) Mother      Hypothyroidism Mother      Intracerebral hemorrhage Mother       Social History     Tobacco Use    Smoking status: Never    Smokeless tobacco: Not on file   Substance Use Topics    Alcohol use: Not on file    Drug use: Not on file       Physical Exam   ED Triage Vitals [11/15/23 1711]   Temp Heart Rate Resp BP   36.6 °C (97.8 °F) 92 19 123/77      SpO2 Temp src Heart Rate Source Patient Position   94 % -- Monitor Sitting      BP Location FiO2 (%)     Left arm --       Physical Exam  Vitals reviewed.   Constitutional:       General: She is not in acute distress.     Appearance: Normal appearance. She is normal weight. She is not ill-appearing, toxic-appearing or diaphoretic.   HENT:      Head: Normocephalic and atraumatic.      Right Ear: Tympanic membrane and external ear normal.      Left Ear: Tympanic membrane and external ear normal.      Nose: Nose normal.      Mouth/Throat:      Mouth: Mucous membranes are moist.   Eyes:      Extraocular Movements: Extraocular movements intact.      Conjunctiva/sclera: Conjunctivae normal.      Pupils: Pupils are equal, round, and reactive to light.   Cardiovascular:      Rate and Rhythm: Normal rate and regular rhythm.   Pulmonary:      Effort: Pulmonary effort is normal. No respiratory distress.      Breath sounds: No stridor.   Abdominal:      General: There is no distension.      Tenderness: There is no abdominal tenderness. There is no guarding.   Musculoskeletal:         General: No swelling, tenderness, deformity or signs of injury.      Cervical back: Normal range of motion. No rigidity or tenderness.   Skin:     General: Skin is warm.      Capillary Refill: Capillary refill takes less than 2 seconds.      Coloration: Skin is not jaundiced or pale.      Findings: No bruising, erythema or rash.   Neurological:      General: No  focal deficit present.      Mental Status: She is alert and oriented to person, place, and time. Mental status is at baseline.   Psychiatric:         Mood and Affect: Mood normal.         Behavior: Behavior normal.         Thought Content: Thought content normal.         Judgment: Judgment normal.         ED Course & MDM   Diagnoses as of 11/16/23 1010   Pneumonia due to infectious organism, unspecified laterality, unspecified part of lung   Acute cough       Medical Decision Making  Differentials bronchitis pneumonia cough URI    Considered x-ray however since her cough is persisted over the past few weeks will treat with antibiotics, steroid inhaler and antitussive.  We will treat for atypical infection patient will get her medication tonight understands return precautions.        Procedure  Procedures     Ady Mcguire PA-C  11/16/23 1019

## 2023-12-05 ENCOUNTER — LAB (OUTPATIENT)
Dept: LAB | Facility: LAB | Age: 63
End: 2023-12-05
Payer: MEDICAID

## 2023-12-05 ENCOUNTER — OFFICE VISIT (OUTPATIENT)
Dept: CARDIOLOGY | Facility: HOSPITAL | Age: 63
End: 2023-12-05
Payer: MEDICAID

## 2023-12-05 VITALS
WEIGHT: 142 LBS | SYSTOLIC BLOOD PRESSURE: 104 MMHG | OXYGEN SATURATION: 96 % | RESPIRATION RATE: 16 BRPM | HEART RATE: 79 BPM | HEIGHT: 64 IN | BODY MASS INDEX: 24.24 KG/M2 | DIASTOLIC BLOOD PRESSURE: 74 MMHG

## 2023-12-05 DIAGNOSIS — R23.1 PALLOR: ICD-10-CM

## 2023-12-05 DIAGNOSIS — I10 HYPERTENSION, UNSPECIFIED TYPE: ICD-10-CM

## 2023-12-05 DIAGNOSIS — Z95.5 HISTORY OF HEART ARTERY STENT: ICD-10-CM

## 2023-12-05 DIAGNOSIS — I21.4 NSTEMI (NON-ST ELEVATED MYOCARDIAL INFARCTION) (MULTI): ICD-10-CM

## 2023-12-05 DIAGNOSIS — E78.00 PURE HYPERCHOLESTEROLEMIA: ICD-10-CM

## 2023-12-05 LAB
ANION GAP SERPL CALC-SCNC: 14 MMOL/L (ref 10–20)
BUN SERPL-MCNC: 7 MG/DL (ref 6–23)
CALCIUM SERPL-MCNC: 9 MG/DL (ref 8.6–10.3)
CHLORIDE SERPL-SCNC: 101 MMOL/L (ref 98–107)
CO2 SERPL-SCNC: 27 MMOL/L (ref 21–32)
CREAT SERPL-MCNC: 0.77 MG/DL (ref 0.5–1.05)
ERYTHROCYTE [DISTWIDTH] IN BLOOD BY AUTOMATED COUNT: 14.2 % (ref 11.5–14.5)
GFR SERPL CREATININE-BSD FRML MDRD: 87 ML/MIN/1.73M*2
GLUCOSE SERPL-MCNC: 96 MG/DL (ref 74–99)
HCT VFR BLD AUTO: 46.6 % (ref 36–46)
HGB BLD-MCNC: 14.7 G/DL (ref 12–16)
MCH RBC QN AUTO: 29.3 PG (ref 26–34)
MCHC RBC AUTO-ENTMCNC: 31.5 G/DL (ref 32–36)
MCV RBC AUTO: 93 FL (ref 80–100)
NRBC BLD-RTO: 0 /100 WBCS (ref 0–0)
PLATELET # BLD AUTO: 240 X10*3/UL (ref 150–450)
POTASSIUM SERPL-SCNC: 4.7 MMOL/L (ref 3.5–5.3)
RBC # BLD AUTO: 5.01 X10*6/UL (ref 4–5.2)
SODIUM SERPL-SCNC: 137 MMOL/L (ref 136–145)
WBC # BLD AUTO: 6.5 X10*3/UL (ref 4.4–11.3)

## 2023-12-05 PROCEDURE — 3074F SYST BP LT 130 MM HG: CPT | Performed by: INTERNAL MEDICINE

## 2023-12-05 PROCEDURE — 93005 ELECTROCARDIOGRAM TRACING: CPT | Performed by: INTERNAL MEDICINE

## 2023-12-05 PROCEDURE — 99214 OFFICE O/P EST MOD 30 MIN: CPT | Performed by: INTERNAL MEDICINE

## 2023-12-05 PROCEDURE — 99214 OFFICE O/P EST MOD 30 MIN: CPT | Mod: 25,27 | Performed by: INTERNAL MEDICINE

## 2023-12-05 PROCEDURE — 80048 BASIC METABOLIC PNL TOTAL CA: CPT

## 2023-12-05 PROCEDURE — 3078F DIAST BP <80 MM HG: CPT | Performed by: INTERNAL MEDICINE

## 2023-12-05 PROCEDURE — 36415 COLL VENOUS BLD VENIPUNCTURE: CPT

## 2023-12-05 PROCEDURE — 85027 COMPLETE CBC AUTOMATED: CPT

## 2023-12-05 PROCEDURE — 93010 ELECTROCARDIOGRAM REPORT: CPT | Performed by: INTERNAL MEDICINE

## 2023-12-05 NOTE — PROGRESS NOTES
12/05/23  2:04 PM    Cardiovascular Medicine    This terrific 63 year-old woman is sen in f/u of CAD with NSTEMI 4.2019 and LASHAE in RCA (3.5 x 38 mm Bethanie stent) she had mild coronary artery disease elsewhere. She has normal LVEF. She has HTN and hyperlipidemia and mixed connective tissue disorder.     Since I saw her last in the spring, she has continued to loose weight, now >25#.  At this point, she is not eating well; recently had a bad bronchitis. She has continued to have some struggles post spine surgery a year ago. She is working with Dr. Winston.    She denies chest pain or dyspnea. No bleeding on aspirin Rx. Some tachycardia when she accidentally was given a lower metoprolol XL dose by pharmacy, now rectified.    She has an intermittent fullness in her left calf; she is worried this could be vascular. Slight swelling. She wears compression.    Past Medical History:   Diagnosis Date    Encounter for general adult medical examination without abnormal findings 10/29/2018    Encounter for preventive health examination    Fibromyalgia 11/12/2021    Fibromyalgia    Old myocardial infarction 11/16/2020    Old myocardial infarct    Other overlap syndromes (CMS/HCC) 10/10/2016    Mixed connective tissue disease    Personal history of other diseases of the digestive system     History of esophageal reflux    Personal history of other diseases of the musculoskeletal system and connective tissue     History of arthritis    Personal history of other infectious and parasitic diseases 04/28/2016    History of herpes zoster    Rheumatoid arthritis, unspecified (CMS/HCC) 05/16/2022    Rheumatoid arthritis    Sjogren syndrome, unspecified (CMS/[facility])     History of Sjogren's disease    Unspecified perforation of tympanic membrane, right ear 07/29/2016    Perforated tympanic membrane, right    Urinary tract infection, site not specified 10/21/2022    Acute lower UTI    Urinary tract infection, site not specified 08/20/2021     Acute lower UTI     Patient Active Problem List   Diagnosis    Acquired pes planus of right foot    Acquired spondylolisthesis    Allergic reaction to food    Ankle fracture, left    Chest pain    Arthritis of left hip    Arthritis of midfoot    Astigmatism of both eyes    Bimalleolar fracture    Brow ptosis, bilateral    Clavicle enlargement    Conductive hearing loss of right ear with unrestricted hearing of left ear    Coronary arteriosclerosis in native artery    Dark stools    Decreased visual acuity    Dermatochalasis of left upper eyelid    Dermatochalasis of right upper eyelid    Dry eye syndrome of bilateral lacrimal glands    Dysuria    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HCC)    Chronic back pain    Chronic pain syndrome    Fibromyalgia    Fluid collection of middle ear    Fracture of foot, closed    GERD (gastroesophageal reflux disease)    History of acute otitis externa    History of heart artery stent    Hyperlipidemia    Mixed hyperlipidemia    Hypertension    Insomnia    Hyperopia with presbyopia of both eyes    Irregular menses    Low vitamin D level    Lumbar radiculopathy    Lumbar spondylosis    Cervical radiculitis    Lumbosacral neuritis    Lumbosacral stenosis    Medial epicondylitis of left elbow    Melanocytic nevi of trunk    Melanocytic nevi of other parts of face    Hemangioma of skin and subcutaneous tissue    Multiple nevi    Muscle spasm    Myocardial infarct (CMS/HCC)    NSTEMI (non-ST elevated myocardial infarction) (CMS/HCC)    Myopia, bilateral    Neoplasm of unspecified behavior of bone, soft tissue, and skin    Other seborrheic keratosis    Other specified systemic involvement of connective tissue (CMS/HCC)    Otitis media of right ear with rupture of tympanic membrane    Plantar wart of right foot    Positive sm/RNP antibody    Post-traumatic headache, not intractable    Ptosis of both eyelids    Rash and other nonspecific skin eruption    Mixed collagen vascular disease  (CMS/HCC)    Rheumatoid arthritis (CMS/HCC)    Sjogrens syndrome (CMS/HCC)    Right upper quadrant abdominal pain of unknown etiology    Seasonal allergies    Sicca syndrome (CMS/HCC)    Other hypertrophic disorders of the skin    Subjective tinnitus, right    Urinary frequency    Vitamin D deficiency     Current Outpatient Medications   Medication Sig Dispense Refill    abatacept (Orencia, with maltose,) 250 mg injection Infuse into a venous catheter.      albuterol 90 mcg/actuation inhaler Inhale 2 puffs every 4 hours if needed for wheezing. 18 g 0    aspirin 81 mg EC tablet 1 tablet DAILY (route: oral)      atorvastatin (Lipitor) 80 mg tablet Take 1 tablet (80 mg) by mouth once daily at bedtime.      cholecalciferol (Vitamin D-3) 50 MCG (2000 UT) tablet Take by mouth.      desloratadine (Clarinex) 5 mg tablet Take 1 tablet (5 mg) by mouth once daily as needed.      DULoxetine (Cymbalta) 60 mg DR capsule Take 1 capsule (60 mg) by mouth 2 times a day.      EPINEPHrine 0.3 mg/0.3 mL injection syringe Inject 0.3 mL (0.3 mg) into the muscle. As Directed      fluorometholone (FML) 0.1 % ophthalmic suspension Administer 1 drop into both eyes 2 times a day.      leflunomide (Arava) 20 mg tablet 1 tablet DAILY (route: oral) 90 tablet 2    metoprolol succinate XL (Toprol-XL) 50 mg 24 hr tablet Take 1 tablet (50 mg) by mouth once daily.      naloxone (Narcan) 4 mg/0.1 mL nasal spray Administer 1 spray (4 mg) into affected nostril(s) if needed for opioid reversal. May repeat every 2-3 minutes if needed, alternating nostrils, until medical assistance becomes available. 2 each 0    omeprazole (PriLOSEC) 40 mg DR capsule Take 1 capsule (40 mg) by mouth once daily.      pilocarpine (Salagen) 5 mg tablet Take 1 tablet (5 mg) by mouth 2 times a day.      polyvinyl alcohol (Liquifilm Tears) 1.4 % ophthalmic solution Administer 1-2 drops into affected eye(s) 4 times a day.      pregabalin (Lyrica) 100 mg capsule Take 1 capsule (100  "mg) by mouth 2 times a day.      traMADol (Ultram) 50 mg tablet Take 1 tablet (50 mg) by mouth every 4 hours if needed for severe pain (7 - 10). 180 tablet 0     No current facility-administered medications for this visit.     ROS + recent bronchitis    Physical exam  /74 (BP Location: Right arm, Patient Position: Sitting, BP Cuff Size: Adult)   Pulse 79   Resp 16   Ht 1.613 m (5' 3.5\")   Wt 64.4 kg (142 lb)   SpO2 96%   BMI 24.76 kg/m²   She is in no distress  HEENT pallor, pale conjunctive  JVP flat  +S1, S2, RRR; no m/r/g  Clear lungs  Abd soft, benign, no bruits  Brisk pulses at ankle and multiphasic DP/PT signals bilaterally  She is alert, oriented  Fluid speech appropriate affect    Component      Latest Ref Rng 4/7/2023   GLUCOSE      74 - 99 mg/dL 95    SODIUM      136 - 145 mmol/L 141    POTASSIUM      3.5 - 5.3 mmol/L 4.8    CHLORIDE      98 - 107 mmol/L 103    Bicarbonate      21 - 32 mmol/L 31    Anion Gap      10 - 20 mmol/L 12    Blood Urea Nitrogen      6 - 23 mg/dL 6    Creatinine      0.50 - 1.05 mg/dL 0.75    GFR Female      >90 mL/min/1.73m2 90    Calcium      8.6 - 10.6 mg/dL 9.4    Albumin      3.4 - 5.0 g/dL 3.5    Alkaline Phosphatase      33 - 136 U/L 63    Total Protein      6.4 - 8.2 g/dL 5.5 (L)    AST      9 - 39 U/L 24    Bilirubin Total      0.0 - 1.2 mg/dL 0.6    ALT      7 - 45 U/L 27    WBC      4.4 - 11.3 x10E9/L 5.0    nRBC      0.0 - 0.0 /100 WBC 0.0    RBC      4.00 - 5.20 x10E12/L 4.88    HEMOGLOBIN      12.0 - 16.0 g/dL 14.6    HEMATOCRIT      36.0 - 46.0 % 46.6 (H)    MCV      80 - 100 fL 95    MCHC      32.0 - 36.0 g/dL 31.3 (L)    Platelets      150 - 450 x10E9/L 226    RED CELL DISTRIBUTION WIDTH      11.5 - 14.5 % 13.2    CHOLESTEROL      0 - 199 mg/dL 147    HDL CHOLESTEROL      mg/dL 65.4    Cholesterol/HDL Ratio 2.2    LDL Calculated      0 - 99 mg/dL 62    VLDL      0 - 40 mg/dL 19    TRIGLYCERIDES      0 - 149 mg/dL 97    Hemoglobin A1C      % 5.4  "   Estimated Average Glucose      MG/       Today's ECG NSR At 79 beats per minute. Normal intervals/axis. No ischemic changes.     Legend:  (L) Low  (H) High    11.2022 Stress test - Dobutamine Echo, no ischemia  Summary:  1. The resting ejection fraction was estimated at 70 to 75% with a peak exercise ejection fraction estimated at >75%.  2. Hyperdynamic global left ventricular systolic function.  3. No clinical, echocardiographic or electrocardiographic evidence for ischemia at a maximal infusion.  4. No ECG changes from baseline.  5. Normal Stress Test.     [id number] Benton Muro MD  Electronically signed on 11/15/2022 at 6:12:24 PM    Assessment/Plan: 63-year-old woman seen in f/u of CAD. She has a history of drug-eluting stent placement in the right coronary artery in 2019 for NSTEMI. She is now on single agent aspirin. She is doing fine from a cardiac perspective without angina or HF sx. She is tolerating aspirin; no on right dose of metoprolol BP is good.    She is recovering from spine surgery and now bronchitis - she has lost some weight; she will monitor; I would have her not intentionally loose more weight.    She looks pale -- I will check HgB today along with BMP.    For her lipids, our goal LDL is < 70 mg/dL and ideally < 55 mg/dL, she was  62 mg/dL and I would not add meds at this time -- with more weight loss this may well be better. She will continue atorvastatin 80 mg/day and recheck lipids in next few months.    As for left fullness, she has mild edema; prior surgery left ankle.  I don't think she has arterial disease.  No extensive varices> I provided reassurance and she will use compression stockings.    RTC 9 months or sooner PRN.    Rebecca Larson MD

## 2023-12-05 NOTE — LETTER
December 5, 2023     Antonietta Candelario, APRN-CNP  [address]  [address] [address]    Patient: Cynthia Cantrell   YOB: 1960   Date of Visit: 12/5/2023       Dear Dr. Antonietta Candelario, APRN-CNP:    Thank you for referring Cynthia Cantrell to me for evaluation. Below are my notes for this consultation.  If you have questions, please do not hesitate to call me. I look forward to following your patient along with you.       Sincerely,     Rebecca Larson MD      CC: No Recipients  ______________________________________________________________________________________    12/05/23  2:04 PM    Cardiovascular Medicine    This terrific 63 year-old woman is sen in f/u of CAD with NSTEMI 4.2019 and LASHAE in RCA (3.5 x 38 mm Bethanie stent) she had mild coronary artery disease elsewhere. She has normal LVEF. She has HTN and hyperlipidemia and mixed connective tissue disorder.     Since I saw her last in the spring, she has continued to loose weight, now >25#.  At this point, she is not eating well; recently had a bad bronchitis. She has continued to have some struggles post spine surgery a year ago. She is working with Dr. Winston.    She denies chest pain or dyspnea. No bleeding on aspirin Rx. Some tachycardia when she accidentally was given a lower metoprolol XL dose by pharmacy, now rectified.    She has an intermittent fullness in her left calf; she is worried this could be vascular. Slight swelling. She wears compression.    Past Medical History:   Diagnosis Date   • Encounter for general adult medical examination without abnormal findings 10/29/2018    Encounter for preventive health examination   • Fibromyalgia 11/12/2021    Fibromyalgia   • Old myocardial infarction 11/16/2020    Old myocardial infarct   • Other overlap syndromes (CMS/HCC) 10/10/2016    Mixed connective tissue disease   • Personal history of other diseases of the digestive system     History of esophageal  reflux   • Personal history of other diseases of the musculoskeletal system and connective tissue     History of arthritis   • Personal history of other infectious and parasitic diseases 04/28/2016    History of herpes zoster   • Rheumatoid arthritis, unspecified (CMS/HCC) 05/16/2022    Rheumatoid arthritis   • Sjogren syndrome, unspecified (CMS/HCC)     History of Sjogren's disease   • Unspecified perforation of tympanic membrane, right ear 07/29/2016    Perforated tympanic membrane, right   • Urinary tract infection, site not specified 10/21/2022    Acute lower UTI   • Urinary tract infection, site not specified 08/20/2021    Acute lower UTI     Patient Active Problem List   Diagnosis   • Acquired pes planus of right foot   • Acquired spondylolisthesis   • Allergic reaction to food   • Ankle fracture, left   • Chest pain   • Arthritis of left hip   • Arthritis of midfoot   • Astigmatism of both eyes   • Bimalleolar fracture   • Brow ptosis, bilateral   • Clavicle enlargement   • Conductive hearing loss of right ear with unrestricted hearing of left ear   • Coronary arteriosclerosis in native artery   • Dark stools   • Decreased visual acuity   • Dermatochalasis of left upper eyelid   • Dermatochalasis of right upper eyelid   • Dry eye syndrome of bilateral lacrimal glands   • Dysuria   • 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[facility])   • Chronic back pain   • Chronic pain syndrome   • Fibromyalgia   • Fluid collection of middle ear   • Fracture of foot, closed   • GERD (gastroesophageal reflux disease)   • History of acute otitis externa   • History of heart artery stent   • Hyperlipidemia   • Mixed hyperlipidemia   • Hypertension   • Insomnia   • Hyperopia with presbyopia of both eyes   • Irregular menses   • Low vitamin D level   • Lumbar radiculopathy   • Lumbar spondylosis   • Cervical radiculitis   • Lumbosacral neuritis   • Lumbosacral stenosis   • Medial epicondylitis of left elbow   • Melanocytic nevi of trunk    • Melanocytic nevi of other parts of face   • Hemangioma of skin and subcutaneous tissue   • Multiple nevi   • Muscle spasm   • Myocardial infarct (CMS/HCC)   • NSTEMI (non-ST elevated myocardial infarction) (CMS/HCC)   • Myopia, bilateral   • Neoplasm of unspecified behavior of bone, soft tissue, and skin   • Other seborrheic keratosis   • Other specified systemic involvement of connective tissue (CMS/HCC)   • Otitis media of right ear with rupture of tympanic membrane   • Plantar wart of right foot   • Positive sm/RNP antibody   • Post-traumatic headache, not intractable   • Ptosis of both eyelids   • Rash and other nonspecific skin eruption   • Mixed collagen vascular disease (CMS/HCC)   • Rheumatoid arthritis (CMS/HCC)   • Sjogrens syndrome (CMS/HCC)   • Right upper quadrant abdominal pain of unknown etiology   • Seasonal allergies   • Sicca syndrome (CMS/HCC)   • Other hypertrophic disorders of the skin   • Subjective tinnitus, right   • Urinary frequency   • Vitamin D deficiency     Current Outpatient Medications   Medication Sig Dispense Refill   • abatacept (Orencia, with maltose,) 250 mg injection Infuse into a venous catheter.     • albuterol 90 mcg/actuation inhaler Inhale 2 puffs every 4 hours if needed for wheezing. 18 g 0   • aspirin 81 mg EC tablet 1 tablet DAILY (route: oral)     • atorvastatin (Lipitor) 80 mg tablet Take 1 tablet (80 mg) by mouth once daily at bedtime.     • cholecalciferol (Vitamin D-3) 50 MCG (2000 UT) tablet Take by mouth.     • desloratadine (Clarinex) 5 mg tablet Take 1 tablet (5 mg) by mouth once daily as needed.     • DULoxetine (Cymbalta) 60 mg DR capsule Take 1 capsule (60 mg) by mouth 2 times a day.     • EPINEPHrine 0.3 mg/0.3 mL injection syringe Inject 0.3 mL (0.3 mg) into the muscle. As Directed     • fluorometholone (FML) 0.1 % ophthalmic suspension Administer 1 drop into both eyes 2 times a day.     • leflunomide (Arava) 20 mg tablet 1 tablet DAILY (route: oral) 90  "tablet 2   • metoprolol succinate XL (Toprol-XL) 50 mg 24 hr tablet Take 1 tablet (50 mg) by mouth once daily.     • naloxone (Narcan) 4 mg/0.1 mL nasal spray Administer 1 spray (4 mg) into affected nostril(s) if needed for opioid reversal. May repeat every 2-3 minutes if needed, alternating nostrils, until medical assistance becomes available. 2 each 0   • omeprazole (PriLOSEC) 40 mg DR capsule Take 1 capsule (40 mg) by mouth once daily.     • pilocarpine (Salagen) 5 mg tablet Take 1 tablet (5 mg) by mouth 2 times a day.     • polyvinyl alcohol (Liquifilm Tears) 1.4 % ophthalmic solution Administer 1-2 drops into affected eye(s) 4 times a day.     • pregabalin (Lyrica) 100 mg capsule Take 1 capsule (100 mg) by mouth 2 times a day.     • traMADol (Ultram) 50 mg tablet Take 1 tablet (50 mg) by mouth every 4 hours if needed for severe pain (7 - 10). 180 tablet 0     No current facility-administered medications for this visit.     ROS + recent bronchitis    Physical exam  /74 (BP Location: Right arm, Patient Position: Sitting, BP Cuff Size: Adult)   Pulse 79   Resp 16   Ht 1.613 m (5' 3.5\")   Wt 64.4 kg (142 lb)   SpO2 96%   BMI 24.76 kg/m²   She is in no distress  HEENT pallor, pale conjunctive  JVP flat  +S1, S2, RRR; no m/r/g  Clear lungs  Abd soft, benign, no bruits  Brisk pulses at ankle and multiphasic DP/PT signals bilaterally  She is alert, oriented  Fluid speech appropriate affect    Component      Latest Ref Rng 4/7/2023   GLUCOSE      74 - 99 mg/dL 95    SODIUM      136 - 145 mmol/L 141    POTASSIUM      3.5 - 5.3 mmol/L 4.8    CHLORIDE      98 - 107 mmol/L 103    Bicarbonate      21 - 32 mmol/L 31    Anion Gap      10 - 20 mmol/L 12    Blood Urea Nitrogen      6 - 23 mg/dL 6    Creatinine      0.50 - 1.05 mg/dL 0.75    GFR Female      >90 mL/min/1.73m2 90    Calcium      8.6 - 10.6 mg/dL 9.4    Albumin      3.4 - 5.0 g/dL 3.5    Alkaline Phosphatase      33 - 136 U/L 63    Total Protein      6.4 " - 8.2 g/dL 5.5 (L)    AST      9 - 39 U/L 24    Bilirubin Total      0.0 - 1.2 mg/dL 0.6    ALT      7 - 45 U/L 27    WBC      4.4 - 11.3 x10E9/L 5.0    nRBC      0.0 - 0.0 /100 WBC 0.0    RBC      4.00 - 5.20 x10E12/L 4.88    HEMOGLOBIN      12.0 - 16.0 g/dL 14.6    HEMATOCRIT      36.0 - 46.0 % 46.6 (H)    MCV      80 - 100 fL 95    MCHC      32.0 - 36.0 g/dL 31.3 (L)    Platelets      150 - 450 x10E9/L 226    RED CELL DISTRIBUTION WIDTH      11.5 - 14.5 % 13.2    CHOLESTEROL      0 - 199 mg/dL 147    HDL CHOLESTEROL      mg/dL 65.4    Cholesterol/HDL Ratio 2.2    LDL Calculated      0 - 99 mg/dL 62    VLDL      0 - 40 mg/dL 19    TRIGLYCERIDES      0 - 149 mg/dL 97    Hemoglobin A1C      % 5.4    Estimated Average Glucose      MG/       Legend:  (L) Low  (H) High    11.2022 Stress test - Dobutamine Echo, no ischemia  Summary:  1. The resting ejection fraction was estimated at 70 to 75% with a peak exercise ejection fraction estimated at >75%.  2. Hyperdynamic global left ventricular systolic function.  3. No clinical, echocardiographic or electrocardiographic evidence for ischemia at a maximal infusion.  4. No ECG changes from baseline.  5. Normal Stress Test.     [id number] Benton Muro MD  Electronically signed on 11/15/2022 at 6:12:24 PM    Assessment/Plan: 63-year-old woman seen in f/u of CAD. She has a history of drug-eluting stent placement in the right coronary artery in 2019 for NSTEMI. She is now on single agent aspirin. She is doing fine from a cardiac perspective without angina or HF sx. She is tolerating aspirin; no on right dose of metoprolol BP is good.    She is recovering from spine surgery and now bronchitis - she has lost some weight; she will monitor; I would have her not intentionally loose more weight.    She looks pale -- I will check HgB today along with BMP.    For her lipids, our goal LDL is < 70 mg/dL and ideally < 55 mg/dL, she was  62 mg/dL and I would not add meds at this time --  with more weight loss this may well be better. She will continue atorvastatin 80 mg/day and recheck lipids in next few months.    As for left fullness, she has mild edema; prior surgery left ankle.  I don't think she has arterial disease.  No extensive varices> I provided reassurance and she will use compression stockings.    RTC 9 months or sooner PRN.    Rebecca Larson MD

## 2023-12-08 ENCOUNTER — HOSPITAL ENCOUNTER (OUTPATIENT)
Dept: RADIOLOGY | Facility: HOSPITAL | Age: 63
Discharge: HOME | End: 2023-12-08
Payer: MEDICAID

## 2023-12-08 ENCOUNTER — APPOINTMENT (OUTPATIENT)
Dept: PRIMARY CARE | Facility: CLINIC | Age: 63
End: 2023-12-08
Payer: MEDICAID

## 2023-12-08 VITALS
OXYGEN SATURATION: 94 % | SYSTOLIC BLOOD PRESSURE: 121 MMHG | RESPIRATION RATE: 17 BRPM | DIASTOLIC BLOOD PRESSURE: 71 MMHG | HEART RATE: 72 BPM | TEMPERATURE: 97.9 F

## 2023-12-08 DIAGNOSIS — M54.16 LUMBAR RADICULOPATHY: ICD-10-CM

## 2023-12-08 PROCEDURE — 64483 NJX AA&/STRD TFRM EPI L/S 1: CPT | Performed by: ANESTHESIOLOGY

## 2023-12-08 PROCEDURE — 2500000004 HC RX 250 GENERAL PHARMACY W/ HCPCS (ALT 636 FOR OP/ED): Performed by: ANESTHESIOLOGY

## 2023-12-08 PROCEDURE — 64483 NJX AA&/STRD TFRM EPI L/S 1: CPT | Mod: LT

## 2023-12-08 PROCEDURE — 94760 N-INVAS EAR/PLS OXIMETRY 1: CPT

## 2023-12-08 PROCEDURE — 77003 FLUOROGUIDE FOR SPINE INJECT: CPT

## 2023-12-08 RX ORDER — MIDAZOLAM HYDROCHLORIDE 1 MG/ML
INJECTION INTRAMUSCULAR; INTRAVENOUS
Status: COMPLETED | OUTPATIENT
Start: 2023-12-08 | End: 2023-12-08

## 2023-12-08 RX ADMIN — MIDAZOLAM HYDROCHLORIDE 2 MG: 1 INJECTION INTRAMUSCULAR; INTRAVENOUS at 07:34

## 2023-12-08 ASSESSMENT — PAIN SCALES - GENERAL
PAINLEVEL_OUTOF10: 5 - MODERATE PAIN
PAINLEVEL_OUTOF10: 7
PAINLEVEL_OUTOF10: 5 - MODERATE PAIN
PAINLEVEL_OUTOF10: 0 - NO PAIN
PAINLEVEL_OUTOF10: 5 - MODERATE PAIN

## 2023-12-08 ASSESSMENT — PAIN - FUNCTIONAL ASSESSMENT
PAIN_FUNCTIONAL_ASSESSMENT: 0-10

## 2023-12-08 NOTE — PROCEDURES
Preoperative diagnosis:  Lumbar radiculitis  Postoperative diagnosis:  Lumbar radiculitis, spinal stenosis  Procedure: Left-sided L4 lumbar transforaminal epidural steroid injection under fluoroscopic guidance  Surgeon: Miriam Winston  Assistant:  Fellow, Ced Deluca  Anesthesia: Local, IV sedation    Complications: Apparently none    Clinical note: Cynthia is a 63-year-old female with history of low back and left leg pain who presents today for the aforementioned procedure.  The patient has some recurrent stenosis and did respond well to prior injection.    Procedure note: The patient was met in the preoperative holding area after risks benefits and alternatives to procedure were discussed with the patient, informed consent was obtained. Patient brought back to the procedure room and placed in the prone position on the fluoroscopy table. Area over the back was exposed, prepped, draped, in the usual sterile fashion.  Skin and subcutaneous tissues to the neuroforamen was anesthetized using 0.5% lidocaine.  A 22-gauge Sprotte needle was inserted in the skin and advanced into the foramen. Needle tip position was confirmed in AP oblique and lateral view.  Contrast was injected which showed initially some discal uptake despite being shelter back in the foramen and above the disc space in the lateral view.  The needle was retracted a millimeter and in the final position there was appropriate epidural spread, no intravascular or intrathecal uptake. A total of 1 mL of 0.5% lidocaine mixed with 10 mg dexamethasone was injected. Needle removed, bandage applied, patient tolerated the procedure well with no immediate complications.

## 2023-12-08 NOTE — H&P
History Of Present Illness  Cynthia Cantrell is a 63 y.o. female presenting with left-sided radiculitis.     Past Medical History  Past Medical History:   Diagnosis Date    Encounter for general adult medical examination without abnormal findings 10/29/2018    Encounter for preventive health examination    Fibromyalgia 11/12/2021    Fibromyalgia    Old myocardial infarction 11/16/2020    Old myocardial infarct    Other overlap syndromes (CMS/HCC) 10/10/2016    Mixed connective tissue disease    Personal history of other diseases of the digestive system     History of esophageal reflux    Personal history of other diseases of the musculoskeletal system and connective tissue     History of arthritis    Personal history of other infectious and parasitic diseases 04/28/2016    History of herpes zoster    Rheumatoid arthritis, unspecified (CMS/HCC) 05/16/2022    Rheumatoid arthritis    Sjogren syndrome, unspecified (CMS/[facility])     History of Sjogren's disease    Unspecified perforation of tympanic membrane, right ear 07/29/2016    Perforated tympanic membrane, right    Urinary tract infection, site not specified 10/21/2022    Acute lower UTI    Urinary tract infection, site not specified 08/20/2021    Acute lower UTI       Surgical History  Past Surgical History:   Procedure Laterality Date    APPENDECTOMY  09/09/2016    Appendectomy    CT ABDOMEN PELVIS ANGIOGRAM W AND/OR WO IV CONTRAST  4/11/2019    CT ABDOMEN PELVIS ANGIOGRAM W AND/OR WO IV CONTRAST 4/11/2019 [facility]GAC[facility]    OTHER SURGICAL HISTORY  09/09/2016    Mastoidectomy, Revision - With Tympanoplasty    OTHER SURGICAL HISTORY  01/07/2021    Foot surgery        Social History  She reports that she has never smoked. She does not have any smokeless tobacco history on file. No history on file for alcohol use and drug use.    Family History  Family History   Problem Relation Name Age of Onset    Other (Cardiac abnormality) Mother      Hypothyroidism  Mother      Intracerebral hemorrhage Mother          Allergies  Egg, Furosemide, Peanut, Adalimumab, Amoxicillin-pot clavulanate, Capsaicin, Capsaicin-menthol, Cephalosporins, Flaxseed, Gabapentin, Ibuprofen, Loperamide, Methotrexate, Milnacipran, Neomycin-polymyxin-hc, Propofol, and Sulfa (sulfonamide antibiotics)    Review of Systems   13 point ROS done and negative except for the above.   Physical Exam     General: NAD, well nourished   Eyes: Non-icteric sclera, EOMI  Ears, Nose, Mouth, and Throat: External ears and nose appear to be without deformity or rash. No lesions or masses noted. Hearing is grossly intact.   Neck: Trachea midline  Respiratory: Nonlabored breathing   Cardiovascular: No JVD  Skin: No rashes or open lesions/ulcers identified on skin.    Last Recorded Vitals  Blood pressure 143/80, pulse 78, temperature 36.3 °C (97.3 °F), temperature source Temporal, resp. rate 16, SpO2 92 %.    Relevant Results           Assessment/Plan   Problem List Items Addressed This Visit             ICD-10-CM       Neuro    Lumbar radiculopathy M54.16    Relevant Orders    Transforaminal    FL pain management TC       Plan for left L4 transforaminal epidural steroid injection.    Risks, benefits, alternatives to the procedure were discussed in detail and patient was amenable to proceeding.    Aldo Deluca MD

## 2023-12-12 LAB
ATRIAL RATE: 79 BPM
P AXIS: 1 DEGREES
P OFFSET: 182 MS
P ONSET: 148 MS
PR INTERVAL: 158 MS
Q ONSET: 227 MS
QRS COUNT: 13 BEATS
QRS DURATION: 68 MS
QT INTERVAL: 346 MS
QTC CALCULATION(BAZETT): 396 MS
QTC FREDERICIA: 379 MS
R AXIS: 0 DEGREES
T AXIS: 56 DEGREES
T OFFSET: 400 MS
VENTRICULAR RATE: 79 BPM

## 2023-12-22 ENCOUNTER — OFFICE VISIT (OUTPATIENT)
Dept: PRIMARY CARE | Facility: CLINIC | Age: 63
End: 2023-12-22
Payer: MEDICAID

## 2023-12-22 VITALS
OXYGEN SATURATION: 96 % | TEMPERATURE: 96.1 F | WEIGHT: 142 LBS | SYSTOLIC BLOOD PRESSURE: 143 MMHG | HEART RATE: 82 BPM | HEIGHT: 63 IN | RESPIRATION RATE: 16 BRPM | DIASTOLIC BLOOD PRESSURE: 94 MMHG | BODY MASS INDEX: 25.16 KG/M2

## 2023-12-22 DIAGNOSIS — M50.30 DDD (DEGENERATIVE DISC DISEASE), CERVICAL: Primary | ICD-10-CM

## 2023-12-22 PROCEDURE — 3080F DIAST BP >= 90 MM HG: CPT | Performed by: NURSE PRACTITIONER

## 2023-12-22 PROCEDURE — 99214 OFFICE O/P EST MOD 30 MIN: CPT | Mod: ZK | Performed by: NURSE PRACTITIONER

## 2023-12-22 PROCEDURE — 1036F TOBACCO NON-USER: CPT | Performed by: NURSE PRACTITIONER

## 2023-12-22 PROCEDURE — 99214 OFFICE O/P EST MOD 30 MIN: CPT | Performed by: NURSE PRACTITIONER

## 2023-12-22 PROCEDURE — 3077F SYST BP >= 140 MM HG: CPT | Performed by: NURSE PRACTITIONER

## 2023-12-22 RX ORDER — TRAMADOL HYDROCHLORIDE 50 MG/1
50 TABLET ORAL EVERY 6 HOURS PRN
Qty: 120 TABLET | Refills: 2 | Status: SHIPPED | OUTPATIENT
Start: 2023-12-22 | End: 2024-01-21

## 2023-12-22 RX ORDER — NICOTINE 11MG/24HR
PATCH, TRANSDERMAL 24 HOURS TRANSDERMAL DAILY
COMMUNITY
Start: 2023-11-10

## 2023-12-22 RX ORDER — PREGABALIN 100 MG/1
100 CAPSULE ORAL 2 TIMES DAILY
Qty: 60 CAPSULE | Refills: 2 | Status: SHIPPED | OUTPATIENT
Start: 2023-12-22 | End: 2024-02-02 | Stop reason: SDUPTHER

## 2023-12-22 ASSESSMENT — PAIN SCALES - GENERAL: PAINLEVEL: 5

## 2023-12-22 NOTE — PROGRESS NOTES
"Subjective   Cynthia Cantrell is a 63 y.o. female who presents for Med Refill.  Med Refill    Needs Lyrica and Ultram  Getting treatments in back  Not sure helping  She has tried to use the pain meds but has a lot of pain and   Is trying to get more mobile and without them unable  Feels the injections - may just need a few more of them  Discussed need for updated vaccinations, she will consider     All systems reviewed. Review of systems negative except for noted positives in HPI    Objective     BP (!) 143/94   Pulse 82   Temp 35.6 °C (96.1 °F)   Resp 16   Ht 1.6 m (5' 3\")   Wt 64.4 kg (142 lb)   SpO2 96%   BMI 25.15 kg/m²    Vital signs noted and reviewed.       Physical Exam  Vitals and nursing note reviewed.   Constitutional:       Appearance: Normal appearance. She is normal weight.   HENT:      Head: Normocephalic and atraumatic.      Right Ear: Tympanic membrane normal.      Left Ear: Tympanic membrane normal.      Nose: Nose normal.      Mouth/Throat:      Mouth: Mucous membranes are dry.   Eyes:      Extraocular Movements: Extraocular movements intact.      Pupils: Pupils are equal, round, and reactive to light.   Cardiovascular:      Rate and Rhythm: Normal rate and regular rhythm.      Pulses: Normal pulses.      Heart sounds: Normal heart sounds.   Pulmonary:      Effort: Pulmonary effort is normal.      Breath sounds: Normal breath sounds.   Abdominal:      General: Bowel sounds are normal.      Palpations: Abdomen is soft.   Musculoskeletal:         General: Normal range of motion.   Skin:     General: Skin is warm and dry.      Capillary Refill: Capillary refill takes less than 2 seconds.   Neurological:      General: No focal deficit present.      Mental Status: She is oriented to person, place, and time.   Psychiatric:         Mood and Affect: Mood normal.         Behavior: Behavior normal.       Wheelchair dependent        Assessment/Plan   Problem List Items Addressed This Visit  "   None  Visit Diagnoses       DDD (degenerative disc disease), cervical    -  Primary    Relevant Medications    pregabalin (Lyrica) 100 mg capsule    traMADol (Ultram) 50 mg tablet                Need for medications long standing history of DDD cervical lumbar and thoracic  Just had fusion last year  Will need another spine surgery, but not willing to do that yet  Is in pain management and is getting joint injections to back/neck area  Trying to feel better    No new issues  Discussed need to keep up on vaccinations health care screenings  She is trying to do so  Feels a bit overwhelmed at times    See back in few months

## 2023-12-29 ENCOUNTER — HOSPITAL ENCOUNTER (OUTPATIENT)
Dept: RADIOLOGY | Facility: HOSPITAL | Age: 63
Discharge: HOME | End: 2023-12-29
Payer: MEDICAID

## 2023-12-29 VITALS
SYSTOLIC BLOOD PRESSURE: 117 MMHG | HEIGHT: 63 IN | RESPIRATION RATE: 17 BRPM | WEIGHT: 142 LBS | DIASTOLIC BLOOD PRESSURE: 69 MMHG | BODY MASS INDEX: 25.16 KG/M2 | HEART RATE: 85 BPM | TEMPERATURE: 97.5 F | OXYGEN SATURATION: 96 %

## 2023-12-29 DIAGNOSIS — M25.552 PAIN OF LEFT HIP: ICD-10-CM

## 2023-12-29 PROCEDURE — 2500000004 HC RX 250 GENERAL PHARMACY W/ HCPCS (ALT 636 FOR OP/ED): Performed by: ANESTHESIOLOGY

## 2023-12-29 PROCEDURE — 77003 FLUOROGUIDE FOR SPINE INJECT: CPT

## 2023-12-29 PROCEDURE — 20610 DRAIN/INJ JOINT/BURSA W/O US: CPT

## 2023-12-29 PROCEDURE — 77003 FLUOROGUIDE FOR SPINE INJECT: CPT | Performed by: ANESTHESIOLOGY

## 2023-12-29 PROCEDURE — 77002 NEEDLE LOCALIZATION BY XRAY: CPT

## 2023-12-29 PROCEDURE — 20610 DRAIN/INJ JOINT/BURSA W/O US: CPT | Performed by: ANESTHESIOLOGY

## 2023-12-29 RX ORDER — MIDAZOLAM HYDROCHLORIDE 1 MG/ML
INJECTION INTRAMUSCULAR; INTRAVENOUS
Status: COMPLETED | OUTPATIENT
Start: 2023-12-29 | End: 2023-12-29

## 2023-12-29 RX ADMIN — MIDAZOLAM HYDROCHLORIDE 2 MG: 1 INJECTION INTRAMUSCULAR; INTRAVENOUS at 08:00

## 2023-12-29 ASSESSMENT — PAIN - FUNCTIONAL ASSESSMENT
PAIN_FUNCTIONAL_ASSESSMENT: 0-10

## 2023-12-29 ASSESSMENT — PAIN SCALES - GENERAL
PAINLEVEL_OUTOF10: 5 - MODERATE PAIN
PAINLEVEL_OUTOF10: 5 - MODERATE PAIN
PAINLEVEL_OUTOF10: 4
PAINLEVEL_OUTOF10: 6

## 2023-12-29 NOTE — PROCEDURES
Preoperative diagnosis/postoperative diagnosis: Left-sided hip pain/degenerative changes/primary osteoarthritis of the left hip  Procedure: Left-sided hip joint injection under fluoroscopic guidance  Surgeon: iMriam Winston  Assistant:  Fellow, Leslie Murillo  Anesthesia: Local, IV sedation  Complications: Apparently none    Clinical note: Cynthia is a 63-year-old female with a history of left-sided hip pain who is here today for a hip injection.    Procedure note: The patient was met in the preoperative holding area after risks benefits and alternatives to procedure were discussed with the patient, informed consent was obtained. Patient brought back to the procedure room and placed in the lateral decubitus position on the fluoroscopy table with the left hip in the air, right hip down.  The area over the left lateral hip joint was exposed, prepped, draped, in the usual sterile fashion.  Chlorhexidine utilized skin and subcutaneous tissues to the joint was anesthetized using 0.5% lidocaine a a nd a 27-gauge needle. 23-gauge spinal needle was inserted in the skin and advanced.  Contrast was injected which showed some spread just outside of the joint space, the needle was advanced and millimeter and in the final position there was appropriate spread around the head of the joint. A total of 3 mL of bupivacaine mixed with 40 mg of methylprednisolone was injected. Needle removed, bandage applied, patient tolerated the procedure well with no immediate complications.

## 2023-12-29 NOTE — H&P
Pain Management H&P    History Of Present Illness  Cynthia Cantrell is a 63 y.o. female with left hip pain who presents for left hip injection.     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    Surgical History  She has a past surgical history that includes Appendectomy (09/09/2016); Other surgical history (09/09/2016); Other surgical history (01/07/2021); and CT angio abdomen pelvis w and or wo IV IV contrast (4/11/2019).     Social History  She reports that she has never smoked. She has never used smokeless tobacco. She reports that she does not drink alcohol and does not use drugs.    Family History  Family History   Problem Relation Name Age of Onset    Other (Cardiac abnormality) Mother      Hypothyroidism Mother      Intracerebral hemorrhage Mother          Allergies  Egg, Furosemide, Peanut, Adalimumab, Amoxicillin-pot clavulanate, Capsaicin, Capsaicin-menthol, Cephalosporins, Flaxseed, Gabapentin, Ibuprofen, Loperamide, Methotrexate, Milnacipran, Neomycin-polymyxin-hc, Propofol, and Sulfa (sulfonamide antibiotics)    Review of Symptoms:   Constitutional: Negative for chills, diaphoresis or fever  HENT: Negative for neck swelling  Eyes:.  Negative for eye pain  Respiratory:.  Negative for  cough, shortness of breath or wheezing    Cardiovascular:.  Negative for chest pain or palpitations  Gastrointestinal:.  Negative for abdominal pain, nausea and vomiting  Genitourinary:.  Negative for urgency  Musculoskeletal:  Positive for back pain. Positive for joint pain. Denies falls within the past 3 months.  Skin: Negative for wounds or itching   Neurological: Negative for dizziness, seizures, loss of consciousness and weakness  Endo/Heme/Allergies: Does not bruise/bleed easily  Psychiatric/Behavioral: Negative for depression. The patient does not appear anxious.       PHYSICAL EXAM  Vitals signs reviewed  Constitutional:       General: Not in acute distress     Appearance: Normal appearance. Not ill-appearing.  HENT:     Head: Normocephalic and atraumatic  Eyes:     Conjunctiva/sclera: Conjunctivae normal  Cardiovascular:     Rate and Rhythm: Normal rate and regular rhythm  Pulmonary:     Effort: No respiratory distress  Abdominal:     Palpations: Abdomen is soft  Musculoskeletal: RANDHAWA  Skin:     General: Skin is warm and dry  Neurological:     General: No focal deficit present  Psychiatric:         Mood and Affect: Mood normal         Behavior: Behavior normal     Last Recorded Vitals  There were no vitals taken for this visit.    Relevant Results  Current Outpatient Medications   Medication Instructions    abatacept (Orencia, with maltose,) 250 mg injection intravenous    albuterol 90 mcg/actuation inhaler 2 puffs, inhalation, Every 4 hours PRN    aspirin 81 mg EC tablet 1 tablet DAILY (route: oral)    atorvastatin (LIPITOR) 80 mg, oral, Nightly    cholecalciferol (Vitamin D-3) 50 MCG (2000 UT) tablet oral    desloratadine (CLARINEX) 5 mg, oral, Daily PRN    DULoxetine (Cymbalta) 60 mg DR capsule 1 capsule, oral, 2 times daily    EPINEPHrine (EPIPEN) 0.3 mg, intramuscular, As Directed    fluorometholone (FML) 0.1 % ophthalmic suspension 1 drop, Both Eyes, 2 times daily    leflunomide (Arava) 20 mg tablet 1  tablet DAILY (route: oral)    metoprolol succinate XL (Toprol-XL) 50 mg 24 hr tablet 1 tablet, oral, Daily    naloxone (NARCAN) 4 mg, nasal, As needed, May repeat every 2-3 minutes if needed, alternating nostrils, until medical assistance becomes available.    omeprazole (PRILOSEC) 40 mg, oral, Daily    pilocarpine (Salagen) 5 mg tablet 1 tablet, oral, 2 times daily    polyvinyl alcohol (Liquifilm Tears) 1.4 % ophthalmic solution 1-2 drops, ophthalmic (eye), 4 times daily    pregabalin (LYRICA) 100 mg, oral, 2 times daily    traMADol (ULTRAM) 50 mg, oral, Every 6 hours PRN    Vitamin D3 50 mcg (2,000 unit) capsule oral, Daily         MR lumbar spine wo IV contrast 06/04/2023    Narrative  Interpreted By:  LORETTA ZABALA MD  MRN: [id number]  Patient Name: ANA SCRUGGS    STUDY:  MRI L-SPINE WO;  6/4/2023 1:05 pm    INDICATION:  pain  M54.16: Lumbar radiculopathy M47.816: Lumbar spondylosis.    COMPARISON:  03/10/2022    ACCESSION NUMBER(S):  [id number]    ORDERING CLINICIAN:  AR ARAUJO    TECHNIQUE:  Sagittal T2, sagittal STIR, sagittal T1, axial T2, axial T1 weighted  MRI images of the lumbar spine were obtained without intravenous  contrast administration.    FINDINGS:  There are postoperative changes compatible with a interval L4  laminectomy as well as resection of the L3 spinous process.    There is a small amount of fluid noted within the subcutaneous fat  along the midline surgical tract extending from the L2 through L4/5  levels suggestive of a postoperative seroma.    The coronal  images again demonstrate a dextrocurvature of the  lumbar spine and levocurvature of the visualized lower thoracic spine.    There is again evidence of 2-3 mm of anterolisthesis of L4 on L5 as  well as 2 mm of retrolisthesis of L1 on L2, L2 on L3, and L3 on L4.    Degenerative signal changes are again noted along endplates  throughout the lumbar and visualized lower thoracic region.    There is interval  increased nonspecific abnormal bone marrow signal  noted along the endplates at the L4/5 level noted be prominently  increased in signal on the STIR images and diminished in signal on  the T1 weighted images left greater than right which while  nonspecific is felt to likely be degenerative in origin although a  component of superimposed nonspecific bone marrow edema conceivably  posttraumatic or inflammatory in origin could give a similar MRI  appearance and therefore can not be completely excluded.    The visualized spinal cord demonstrates no signal abnormality within  it. The conus medullaris terminates at the inferior L2 level.    There is new nonspecific clumping of nerve roots of the cauda equina  within the lumbar and sacral region with the lobulated clumped nerve  roots of the cauda equina peripherally positioned within the thecal  sac within the lower lumbar/sacral region. While nonspecific, the  findings raise the possibility of underlying arachnoiditis.    At the L5/S1 level,  there is posterior osteophytic spurring and  posterior disc bulge along with degenerative facet changes  contributing to mild overall narrowing of the thecal sac within the  spinal canal. There is severe right and moderate left-sided neural  foraminal narrowing.    At the L4/L5 level,  there are postoperative changes compatible with  an interval L4 laminectomy. There is 2-3 mm of anterolisthesis of L4  on L5, a posterior disc bulge with superimposed posterior disc  herniation with extruded disc material extending cranially along the  posterior margin of the L4 vertebral body which in combination with  hypertrophic degenerative facet changes and ligamentum flavum  hypertrophy contributes to severe spinal canal narrowing there is  severe left and moderate right-sided neural foraminal narrowing.    At the L3/L4 level,  there is 2 mm of retrolisthesis of L3 on L4,  posterior osteophytic spurring, and a posterior disc bulge with  a  superimposed left-sided disc herniation along with degenerative facet  changes contributing to mild overall narrowing of the thecal sac  within the spinal canal. There is severe left and moderate  right-sided neural foraminal narrowing.    At the L2/L3 level,  there is 2 mm of retrolisthesis of L2 on L3,  posterior osteophytic spurring and posterior disc bulge along with  degenerative facet changes and ligamentum flavum hypertrophy. There  is mild prominence of the epidural fat posteriorly within the spinal  canal. The combination of findings contributes to moderate narrowing  of the thecal sac in the AP dimension within the spinal canal. There  is mild-to-moderate left and mild right-sided neural foraminal  narrowing.    At the L1/L2 level,  there is 2 mm of retrolisthesis of L1 on L2,  posterior osteophytic spurring, and a posterior disc bulge along with  degenerative facet changes contributing to mild spinal canal  narrowing. There is moderate to severe right and mild left-sided  neural foraminal narrowing.    At the T12/L1 level,  there is a right paracentral disc herniation  with extruded disc material extending cranially along the posterior  margin of the T12 vertebral body measuring approximally 4 mm in AP  dimension compared with 3 mm on the prior study dated 03/10/2022  along with mild degenerative facet changes. There is effacement of  right ventral subarachnoid space with minimal flattening the right  ventral margin of the spinal cord which is draped over the disc  herniation. There is mild encroachment upon the right neural foramen.  There is no significant left-sided neural foraminal narrowing.    At the T11/12 level, there is a left-sided disc herniation measuring  approximally 2-3 mm in AP dimension contributing to mild encroachment  upon the spinal canal. There is no significant neural foraminal  narrowing.    At the T10/11 level, the sagittal images demonstrate degenerative  facet changes left  greater than right along with a mild posterior  disc. There is encroachment upon the neural foramen left greater than  right. No axial images were obtained through this level limiting  further evaluation.    Impression  There are postoperative changes compatible with a interval L4  laminectomy as well as resection of the L3 spinous process. There is  a small amount of fluid noted within the subcutaneous fat along the  midline surgical tract extending from the L2 through L4/5 levels  suggestive of a postoperative seroma.    The coronal  images again demonstrate a dextrocurvature of the  lumbar spine and levocurvature of the visualized lower thoracic spine.    There is again evidence of 2-3 mm of anterolisthesis of L4 on L5 as  well as 2 mm of retrolisthesis of L1 on L2, L2 on L3, and L3 on L4.    There is interval increased nonspecific abnormal bone marrow signal  noted along the endplates at the L4/5 level noted be prominently  increased in signal on the STIR images and diminished in signal on  the T1 weighted images left greater than right which while  nonspecific is felt to likely be degenerative in origin although a  component of superimposed nonspecific bone marrow edema conceivably  posttraumatic or inflammatory in origin could give a similar MRI  appearance and therefore can not be completely excluded.    There is multilevel spondylosis. There are varying degrees of spinal  canal and neural foraminal narrowing as described above.    The study was interpreted at [facility].      MR LUMBAR SPINE WO IV CONTRAST 03/10/2022    Narrative  MRN: [id number]  Patient Name: ANA SCRUGGS    STUDY:  MRI L-SPINE WO;  3/10/2022 8:38 am    INDICATION:  low back pain  M47.816: Lumbar spondylosis.    COMPARISON:  March 28, 2018    ACCESSION NUMBER(S):  [id number]    ORDERING CLINICIAN:  AR ARAUJO    TECHNIQUE:  Sagittal T1, T2, STIR, axial T1 and T2 weighted images of the  lumbar  spine were acquired.    FINDINGS:  Alignment: Limited coronal  images suggest a dextroconvex  curvature of the lumbar spine. There is mild, grade 1 retrolisthesis  L1 on L2, L2 on L3, and L3 on L4 and mild, grade 1 anterolisthesis of  L4 on L5.    Vertebrae/Intervertebral Discs: The vertebral bodies demonstrate  expected height.  There has been interval worsening of multilevel  degenerative endplate signal changes which are most pronounced at  L1-2, L3-4, and L5-S1. There is abnormal increased signal on STIR  imaging within the left L2 and L4 pedicles favored to be due to  degenerative or stress related changes. There is multilevel disc  desiccation and degenerative endplate spurring with loss of disc  height most severe at L5-S1 and L1-2 with significant interval  worsening at L1-2. The marrow signal is otherwise diffusely  inhomogeneous throughout on T1 weighted imaging which is nonspecific.  There are scattered hemangiomas or focal fatty rests. There is an  element of congenital narrowing of the spinal canal due to  developmentally short pedicles.    Conus: The lower thoracic cord appears unremarkable. The conus  terminates at L2 which is low lying.    T11-12: There is a tiny left paracentral disc protrusion with minimal  impression on the left ventral subarachnoid space.  T12-L1: Mild degenerative facet changes and a bilobed posterior disc  protrusion partially efface the ventral subarachnoid space and  contribute to mild central canal stenosis.  L1-2: Mild facet and ligamentum flavum hypertrophy are demonstrated.  There is circumferential disc bulging and endplate spurring  contributing to mild central canal stenosis. There is severe right  and moderate left-sided neuroforaminal narrowing due to  intraforaminal/far right lateral disc protrusion. There has been  interval worsening from the previous examination.  L2-3: Facet and ligamentum flavum hypertrophy and prominent posterior  epidural fat  impress on the posterior thecal sac. There is  circumferential disc bulging with a superimposed left paracentral  disc protrusion. There is at least moderate narrowing of the thecal  sac with crowding of the nerve roots of the cauda equina. There is  mild, left greater than right neuroforaminal stenosis. There has been  interval worsening from the previous exam.  L3-4: Facet and ligamentum flavum hypertrophy and prominent posterior  epidural fat impress on the dorsal thecal sac. Circumferential disc  bulging and endplate spurring are also demonstrated. There is  narrowing of the lateral recesses and of the thecal sac with marked  crowding of the nerve roots of the cauda equina. Endplate spurring  and intraforaminal/far lateral disc protrusions produce severe left  and mild right-sided neuroforaminal stenosis. There has been  significant interval worsening from the previous examination.  L4-5: Facet and ligamentum flavum hypertrophy and prominent posterior  epidural fat impress on the dorsal thecal sac. Fluid in the left  facet is nonspecific but favored to be degenerative in nature. There  is circumferential disc bulging with a superimposed broad-based disc  protrusion producing effacement of the lateral recesses. There is  marked crowding of nerve roots of the cauda equina within the thecal  sac. Intraforaminal/far lateral disc protrusions produce severe left  and mild right-sided neuroforaminal stenosis. The neuroforaminal  narrowing is relatively similar from the previous examination.  However, mass effect on the thecal sac has progressed.  L5-S1: Facet and ligamentum flavum hypertrophy are demonstrated.  There is circumferential disc bulging and endplate spurring with  effacement of the lateral recesses and mild central canal stenosis.  There is severe, right greater than left neuroforaminal narrowing due  to endplate spurring and disc bulging, similar to mildly progressed  from the previous examination. There  is irregularity and kinking of  nerve roots of the cauda equina in the thecal sac from L5 through S1.    There is a small, inadequately evaluated right renal lesion favored  to represent a cyst. There are colonic diverticula.    Impression  1. Advanced, multilevel degenerative changes of the lumbar spine  superimposed on congenital narrowing of the spinal canal and epidural  lipomatosis with interval progression of degenerative disease from  2018. Mass effect on the thecal sac and crowding of the nerve roots  of the cauda equina are most severe at L4-5, L3-4, and L2-3.  2. Grade 1 anterolisthesis of L4 on L5 is new from the previous  examination.  3. Dextroscoliosis.     No image results found.       1. Pain of left hip  FL guided injection hip left    FL guided injection hip left           ASSESSMENT/PLAN  Cynthia Cantrell is a 63 y.o. female with left hip pain who presents for left hip injection.    Our plan is as follows:  - Proceed with aforementioned procedure       Leslie Murillo DO   Pain fellow

## 2024-01-04 ENCOUNTER — OFFICE VISIT (OUTPATIENT)
Dept: PAIN MEDICINE | Facility: HOSPITAL | Age: 64
End: 2024-01-04
Payer: MEDICAID

## 2024-01-04 DIAGNOSIS — M54.16 LUMBAR RADICULOPATHY: ICD-10-CM

## 2024-01-04 PROCEDURE — 99214 OFFICE O/P EST MOD 30 MIN: CPT | Performed by: ANESTHESIOLOGY

## 2024-01-04 PROCEDURE — 1036F TOBACCO NON-USER: CPT | Performed by: ANESTHESIOLOGY

## 2024-01-04 ASSESSMENT — PAIN SCALES - GENERAL: PAINLEVEL: 6

## 2024-01-05 NOTE — PROGRESS NOTES
Chief Complaint   Patient presents with    Back Pain    Hip Pain      HPI   Cynthia is a 63-year-old female with a history of back pain and leg pain.  The patient underwent spine surgery and overall did see improvement with that.  The patient has seen improvement with both hip injections and transforaminal.  She had her last left L4 transforaminal on 12/8/2023 but she did not see prolonged relief.  She does not want to consider further surgical measures if she does not have to.  She feels worse pain with activity and better with rest.  She has done a full course of physical therapy and keeps up with exercises at least 3-4 times a week which are physician directed and has done that since her surgery in 2022.  She had decompression surgery.  She did have an updated MRI which does show some narrowing in the spine.    ROS: 13 point review of systems is complete and is negative listed above in HPI    Imaging:  Narrative & Impression   Interpreted By:  LORETTA ZABALA MD  MRN: [id number]  Patient Name: CYNTHIA SCRUGGS     STUDY:  MRI L-SPINE WO;  6/4/2023 1:05 pm     INDICATION:  pain  M54.16: Lumbar radiculopathy M47.816: Lumbar spondylosis.     COMPARISON:  03/10/2022     ACCESSION NUMBER(S):  [id number]     ORDERING CLINICIAN:  AR ARAUJO     TECHNIQUE:  Sagittal T2, sagittal STIR, sagittal T1, axial T2, axial T1 weighted  MRI images of the lumbar spine were obtained without intravenous  contrast administration.     FINDINGS:  There are postoperative changes compatible with a interval L4  laminectomy as well as resection of the L3 spinous process.     There is a small amount of fluid noted within the subcutaneous fat  along the midline surgical tract extending from the L2 through L4/5  levels suggestive of a postoperative seroma.     The coronal  images again demonstrate a dextrocurvature of the  lumbar spine and levocurvature of the visualized lower thoracic spine.     There is again evidence of 2-3  mm of anterolisthesis of L4 on L5 as  well as 2 mm of retrolisthesis of L1 on L2, L2 on L3, and L3 on L4.     Degenerative signal changes are again noted along endplates  throughout the lumbar and visualized lower thoracic region.     There is interval increased nonspecific abnormal bone marrow signal  noted along the endplates at the L4/5 level noted be prominently  increased in signal on the STIR images and diminished in signal on  the T1 weighted images left greater than right which while  nonspecific is felt to likely be degenerative in origin although a  component of superimposed nonspecific bone marrow edema conceivably  posttraumatic or inflammatory in origin could give a similar MRI  appearance and therefore can not be completely excluded.     The visualized spinal cord demonstrates no signal abnormality within  it. The conus medullaris terminates at the inferior L2 level.     There is new nonspecific clumping of nerve roots of the cauda equina  within the lumbar and sacral region with the lobulated clumped nerve  roots of the cauda equina peripherally positioned within the thecal  sac within the lower lumbar/sacral region. While nonspecific, the  findings raise the possibility of underlying arachnoiditis.     At the L5/S1 level,  there is posterior osteophytic spurring and  posterior disc bulge along with degenerative facet changes  contributing to mild overall narrowing of the thecal sac within the  spinal canal. There is severe right and moderate left-sided neural  foraminal narrowing.     At the L4/L5 level,  there are postoperative changes compatible with  an interval L4 laminectomy. There is 2-3 mm of anterolisthesis of L4  on L5, a posterior disc bulge with superimposed posterior disc  herniation with extruded disc material extending cranially along the  posterior margin of the L4 vertebral body which in combination with  hypertrophic degenerative facet changes and ligamentum flavum  hypertrophy  contributes to severe spinal canal narrowing there is  severe left and moderate right-sided neural foraminal narrowing.     At the L3/L4 level,  there is 2 mm of retrolisthesis of L3 on L4,  posterior osteophytic spurring, and a posterior disc bulge with a  superimposed left-sided disc herniation along with degenerative facet  changes contributing to mild overall narrowing of the thecal sac  within the spinal canal. There is severe left and moderate  right-sided neural foraminal narrowing.     At the L2/L3 level,  there is 2 mm of retrolisthesis of L2 on L3,  posterior osteophytic spurring and posterior disc bulge along with  degenerative facet changes and ligamentum flavum hypertrophy. There  is mild prominence of the epidural fat posteriorly within the spinal  canal. The combination of findings contributes to moderate narrowing  of the thecal sac in the AP dimension within the spinal canal. There  is mild-to-moderate left and mild right-sided neural foraminal  narrowing.     At the L1/L2 level,  there is 2 mm of retrolisthesis of L1 on L2,  posterior osteophytic spurring, and a posterior disc bulge along with  degenerative facet changes contributing to mild spinal canal  narrowing. There is moderate to severe right and mild left-sided  neural foraminal narrowing.     At the T12/L1 level,  there is a right paracentral disc herniation  with extruded disc material extending cranially along the posterior  margin of the T12 vertebral body measuring approximally 4 mm in AP  dimension compared with 3 mm on the prior study dated 03/10/2022  along with mild degenerative facet changes. There is effacement of  right ventral subarachnoid space with minimal flattening the right  ventral margin of the spinal cord which is draped over the disc  herniation. There is mild encroachment upon the right neural foramen.  There is no significant left-sided neural foraminal narrowing.     At the T11/12 level, there is a left-sided disc  herniation measuring  approximally 2-3 mm in AP dimension contributing to mild encroachment  upon the spinal canal. There is no significant neural foraminal  narrowing.     At the T10/11 level, the sagittal images demonstrate degenerative  facet changes left greater than right along with a mild posterior  disc. There is encroachment upon the neural foramen left greater than  right. No axial images were obtained through this level limiting  further evaluation.              IMPRESSION:  There are postoperative changes compatible with a interval L4  laminectomy as well as resection of the L3 spinous process. There is  a small amount of fluid noted within the subcutaneous fat along the  midline surgical tract extending from the L2 through L4/5 levels  suggestive of a postoperative seroma.     The coronal  images again demonstrate a dextrocurvature of the  lumbar spine and levocurvature of the visualized lower thoracic spine.     There is again evidence of 2-3 mm of anterolisthesis of L4 on L5 as  well as 2 mm of retrolisthesis of L1 on L2, L2 on L3, and L3 on L4.     There is interval increased nonspecific abnormal bone marrow signal  noted along the endplates at the L4/5 level noted be prominently  increased in signal on the STIR images and diminished in signal on  the T1 weighted images left greater than right which while  nonspecific is felt to likely be degenerative in origin although a  component of superimposed nonspecific bone marrow edema conceivably  posttraumatic or inflammatory in origin could give a similar MRI  appearance and therefore can not be completely excluded.     There is multilevel spondylosis. There are varying degrees of spinal  canal and neural foraminal narrowing as described above.     Physical Exam:  Gen:  NAD  Eyes: Pupils are symmetric  ENT: Hearing is grossly intact  Neck: No JVD noted, tracheal position is midline  Respiratory: No gasping or shortness of breath noted  Cardiovascular:  Extremity show no edema or varicosities  Skin:  No rashes or open lesions or ulcers identified on the skin  Musculoskeletal: Positive SLR on left, intact strength  Neurologic: Cranial nerves II through XII are grossly intact  Psychiatric:  Patient is alert and oriented x3    Impression/Plan:  63-year-old female with a history of low back and left leg pain.  Patient has a history of prior decompression.    -Will plan for spinal cord stimulator evaluation.  We discussed the procedure, risk, benefits, alternatives of both the trial and permanent implant.  We discussed the need for psychological clearance and evaluation.  Will give referral for clearance.    -Advised the patient to also see her surgeon back to talk about any further surgical measures though she would not like to have surgery again if possible.    -May consider repeating hip injection in the future.    -Encouraged her to keep up with physical therapy and core strengthening.  Will give referral back to physical therapy though she is kept up with exercise on her own regularly.

## 2024-01-17 ENCOUNTER — OFFICE VISIT (OUTPATIENT)
Dept: NEUROSURGERY | Facility: HOSPITAL | Age: 64
End: 2024-01-17
Payer: MEDICAID

## 2024-01-17 VITALS
WEIGHT: 142 LBS | DIASTOLIC BLOOD PRESSURE: 93 MMHG | HEART RATE: 92 BPM | RESPIRATION RATE: 18 BRPM | SYSTOLIC BLOOD PRESSURE: 128 MMHG | HEIGHT: 63 IN | BODY MASS INDEX: 25.16 KG/M2

## 2024-01-17 DIAGNOSIS — M54.16 LUMBAR RADICULOPATHY: Primary | ICD-10-CM

## 2024-01-17 PROCEDURE — 1036F TOBACCO NON-USER: CPT | Performed by: NEUROLOGICAL SURGERY

## 2024-01-17 PROCEDURE — 99212 OFFICE O/P EST SF 10 MIN: CPT | Performed by: NEUROLOGICAL SURGERY

## 2024-01-17 PROCEDURE — 3080F DIAST BP >= 90 MM HG: CPT | Performed by: NEUROLOGICAL SURGERY

## 2024-01-17 PROCEDURE — 3074F SYST BP LT 130 MM HG: CPT | Performed by: NEUROLOGICAL SURGERY

## 2024-01-17 ASSESSMENT — PAIN SCALES - GENERAL: PAINLEVEL: 6

## 2024-01-17 NOTE — PROGRESS NOTES
11-17-22 L3-5 laminectomy. Today having pain on the left back and the left leg. Worse when sitting and walking. Having spasms. Has not had PT and saw Dr. Winston of pain management. She discussed SCS.     63-year-old woman who previously underwent lumbar decompression returns for follow-up.  At the time of her last visit her pain was significantly improved following surgery.  More recently however she has developed new pain radiating down her left leg from her buttock.  She also has spasms in that leg.    On exam, the patient is alert and interactive.  She moves lower extremities symmetrically.    A new MRI of her cervical spine that I personally reviewed demonstrates postsurgical changes in the region of her laminectomy.  She has a further degeneration both above and below the region of her surgery with canal and foraminal stenosis and a component of rotatory scoliosis.    I discussed these findings with the patient.  Any open surgical intervention would require a multilevel reexploration and extension of her laminectomy and fusion with rods and screws.  We reviewed the risks and benefits of this.  She has also been offered spinal cord stimulation for symptom relief.  Given the extent of surgery and the risks involved, I am not against spinal cord stimulation to see if this can control her pain before considering a larger operation.

## 2024-02-02 ENCOUNTER — OFFICE VISIT (OUTPATIENT)
Dept: PRIMARY CARE | Facility: CLINIC | Age: 64
End: 2024-02-02
Payer: MEDICAID

## 2024-02-02 VITALS
OXYGEN SATURATION: 98 % | TEMPERATURE: 96.4 F | DIASTOLIC BLOOD PRESSURE: 89 MMHG | BODY MASS INDEX: 24.8 KG/M2 | HEART RATE: 97 BPM | WEIGHT: 140 LBS | RESPIRATION RATE: 16 BRPM | SYSTOLIC BLOOD PRESSURE: 134 MMHG | HEIGHT: 63 IN

## 2024-02-02 DIAGNOSIS — T78.2XXD ANAPHYLAXIS, SUBSEQUENT ENCOUNTER: ICD-10-CM

## 2024-02-02 DIAGNOSIS — M62.838 MUSCLE SPASM: ICD-10-CM

## 2024-02-02 DIAGNOSIS — M50.30 DDD (DEGENERATIVE DISC DISEASE), CERVICAL: Primary | ICD-10-CM

## 2024-02-02 PROCEDURE — 99214 OFFICE O/P EST MOD 30 MIN: CPT | Mod: ZK | Performed by: NURSE PRACTITIONER

## 2024-02-02 PROCEDURE — 1036F TOBACCO NON-USER: CPT | Performed by: NURSE PRACTITIONER

## 2024-02-02 PROCEDURE — 99214 OFFICE O/P EST MOD 30 MIN: CPT | Performed by: NURSE PRACTITIONER

## 2024-02-02 PROCEDURE — 3079F DIAST BP 80-89 MM HG: CPT | Performed by: NURSE PRACTITIONER

## 2024-02-02 PROCEDURE — 3075F SYST BP GE 130 - 139MM HG: CPT | Performed by: NURSE PRACTITIONER

## 2024-02-02 RX ORDER — CYCLOBENZAPRINE HCL 10 MG
10 TABLET ORAL 3 TIMES DAILY PRN
Qty: 60 TABLET | Refills: 11 | Status: SHIPPED | OUTPATIENT
Start: 2024-02-02 | End: 2024-05-17

## 2024-02-02 RX ORDER — PREGABALIN 100 MG/1
100 CAPSULE ORAL 2 TIMES DAILY
Qty: 60 CAPSULE | Refills: 2 | Status: SHIPPED | OUTPATIENT
Start: 2024-02-02 | End: 2024-03-01 | Stop reason: SDUPTHER

## 2024-02-02 RX ORDER — EPINEPHRINE 0.3 MG/.3ML
0.3 INJECTION SUBCUTANEOUS ONCE AS NEEDED
Qty: 2 EACH | Refills: 11 | Status: SHIPPED | OUTPATIENT
Start: 2024-02-02

## 2024-02-02 RX ORDER — TRAMADOL HYDROCHLORIDE 50 MG/1
50 TABLET ORAL EVERY 6 HOURS PRN
Qty: 120 TABLET | Refills: 0 | Status: SHIPPED | OUTPATIENT
Start: 2024-02-02 | End: 2024-03-01 | Stop reason: SDUPTHER

## 2024-02-02 ASSESSMENT — PAIN SCALES - GENERAL: PAINLEVEL: 7

## 2024-02-02 NOTE — PROGRESS NOTES
"Subjective   Cynthia Cantrell is a 63 y.o. female who presents for Follow-up.  HPI  Here for followup  Needs pain meds  Doing just so so from her back  Feels very frustrated with her progress in DDD in neck and low back  Surgery from last year she feels was a success  May need spinal stimulator  All systems reviewed. Review of systems negative except for noted positives in HPI    Objective     /89   Pulse 97   Temp 35.8 °C (96.4 °F)   Resp 16   Ht 1.6 m (5' 3\")   Wt 63.5 kg (140 lb)   SpO2 98%   BMI 24.80 kg/m²    Vital signs noted and reviewed.       Physical Exam  Vitals and nursing note reviewed.   Constitutional:       Appearance: Normal appearance.   HENT:      Head: Normocephalic and atraumatic.      Right Ear: Tympanic membrane normal.      Left Ear: Tympanic membrane normal.      Nose: Nose normal.      Mouth/Throat:      Mouth: Mucous membranes are moist.   Eyes:      Extraocular Movements: Extraocular movements intact.      Conjunctiva/sclera: Conjunctivae normal.      Pupils: Pupils are equal, round, and reactive to light.   Cardiovascular:      Rate and Rhythm: Normal rate and regular rhythm.      Pulses: Normal pulses.      Heart sounds: Normal heart sounds.   Pulmonary:      Effort: Pulmonary effort is normal.      Breath sounds: Normal breath sounds.   Abdominal:      General: Bowel sounds are normal.      Palpations: Abdomen is soft.   Musculoskeletal:         General: Normal range of motion.      Cervical back: Normal range of motion and neck supple.   Skin:     General: Skin is warm and dry.      Capillary Refill: Capillary refill takes 2 to 3 seconds.   Neurological:      Mental Status: She is alert and oriented to person, place, and time.   Psychiatric:         Mood and Affect: Mood normal.         Behavior: Behavior normal.         Thought Content: Thought content normal.         Judgment: Judgment normal.             Assessment/Plan   Problem List Items Addressed This Visit  "         Musculoskeletal and Injuries    Muscle spasm    Relevant Medications    cyclobenzaprine (Flexeril) 10 mg tablet     Other Visit Diagnoses       DDD (degenerative disc disease), cervical    -  Primary    Relevant Medications    traMADol (Ultram) 50 mg tablet    pregabalin (Lyrica) 100 mg capsule    Anaphylaxis, subsequent encounter        Relevant Medications    EPINEPHrine 0.3 mg/0.3 mL injection syringe                Meds as noted above  Refills as noted above  Will see back as discussed  Up to date with most health screenings  Has been slow to getting some of them due to the pain

## 2024-02-07 ENCOUNTER — OFFICE VISIT (OUTPATIENT)
Dept: BEHAVIORAL HEALTH | Facility: CLINIC | Age: 64
End: 2024-02-07
Payer: MEDICAID

## 2024-02-07 DIAGNOSIS — M54.16 LUMBAR RADICULOPATHY: ICD-10-CM

## 2024-02-07 DIAGNOSIS — F54 PSYCHOLOGICAL FACTORS AFFECTING MEDICAL CONDITION: ICD-10-CM

## 2024-02-07 PROCEDURE — 1036F TOBACCO NON-USER: CPT

## 2024-02-07 PROCEDURE — 90791 PSYCH DIAGNOSTIC EVALUATION: CPT
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
## 2024-02-08 DIAGNOSIS — M06.9 RHEUMATOID ARTHRITIS INVOLVING MULTIPLE SITES, UNSPECIFIED WHETHER RHEUMATOID FACTOR PRESENT (MULTI): Primary | ICD-10-CM

## 2024-02-08 RX ORDER — METHYLPREDNISOLONE 4 MG/1
TABLET ORAL
Qty: 21 TABLET | Refills: 0 | Status: SHIPPED | OUTPATIENT
Start: 2024-02-08 | End: 2024-02-19 | Stop reason: SDUPTHER

## 2024-02-18 NOTE — PROGRESS NOTES
Subjective   Patient ID: Cynthia Cantrell is a 63 y.o. female who presents for Rheumatoid Arthritis (FUV- pt states had flare 2 weeks. Rx prednisone completed. Infusion of Orencia this morning. Pt is waiting to see if it helps.).    HPI   · Rheumatoid arthritis (714.0) (M06.9)   · Vitamin D deficiency (268.9) (E55.9)   · Sjogrens syndrome (710.2) (M35.00)   · History of COVID (079.89) (U07.1)   · Long-term use of Plaquenil (V58.69) (Z79.899)   · Encounter for long-term current use of high risk medication (V58.69) (Z79.899)    Had flare recently and needed prednisone   Pain L hip injected still painful when sitting  Still has pain r wrist I can inject that today    Went over all her meds on her extensive list.  She is continuing with all without dose change.  Orencia  Working well occasional flare utilizing a Medrol Dosepak  PAIN:  SWELLING:   AM GEL:  SIDE EFFECTS OF MED:    LAST LAB  Lab Results   Component Value Date    SEDRATE 4 03/08/2023    TSH 3.11 04/15/2019         PHYSICAL EXAM  NODES   HEART  LUNGS  ABDOMEN   VASCULAR  NEURO   SKIN  JOINTS some synovial thickening at the right wrist there is no evidence for any active synovitis elsewhere in the upper or lower extremity  There is currently no information documented on the homunculus. Go to the Rheumatology activity and complete the homunculus joint exam.   Assessment/Plan   Diagnoses and all orders for this visit:  Rheumatoid arthritis involving multiple sites with positive rheumatoid factor (CMS/HCC)  -     methylPREDNISolone (Medrol Dospak) 4 mg tablets; Follow schedule on package instructions  Sjogren's syndrome with keratoconjunctivitis sicca (CMS/HCC)  Fibromyalgia  Lumbar radiculopathy  Rheumatoid arthritis involving multiple sites, unspecified whether rheumatoid factor present (CMS/HCC)  -     methylPREDNISolone (Medrol Dospak) 4 mg tablets; Follow schedule on package instructions  Today I injected her right wrist she tolerated the procedure  well  She will continue all of her medications including Plaquenil leflunomide tramadol pilocarpine pregabalin and on occasion a Medrol Dosepak.  Labs normal in December 2023.Patient ID: Cynthia Cantrell is a 63 y.o. female.    Medium Joint Injection/Arthrocentesis: R radiocarpal on 2/19/2024 1:41 PM  Indications: pain  Details: 25 G needle, dorsal approach  Medications: 20 mg triamcinolone acetonide 10 mg/mL; 0.5 mL lidocaine 5 mg/mL (0.5 %)  Outcome: tolerated well, no immediate complications        I will see her back in 6 months

## 2024-02-19 ENCOUNTER — OFFICE VISIT (OUTPATIENT)
Dept: RHEUMATOLOGY | Facility: CLINIC | Age: 64
End: 2024-02-19
Payer: MEDICAID

## 2024-02-19 VITALS
SYSTOLIC BLOOD PRESSURE: 118 MMHG | WEIGHT: 145 LBS | DIASTOLIC BLOOD PRESSURE: 82 MMHG | HEART RATE: 96 BPM | BODY MASS INDEX: 25.69 KG/M2

## 2024-02-19 DIAGNOSIS — M79.7 FIBROMYALGIA: ICD-10-CM

## 2024-02-19 DIAGNOSIS — M06.9 RHEUMATOID ARTHRITIS INVOLVING MULTIPLE SITES, UNSPECIFIED WHETHER RHEUMATOID FACTOR PRESENT (MULTI): ICD-10-CM

## 2024-02-19 DIAGNOSIS — M54.16 LUMBAR RADICULOPATHY: ICD-10-CM

## 2024-02-19 DIAGNOSIS — M35.01 SJOGREN'S SYNDROME WITH KERATOCONJUNCTIVITIS SICCA (MULTI): ICD-10-CM

## 2024-02-19 DIAGNOSIS — M05.79 RHEUMATOID ARTHRITIS INVOLVING MULTIPLE SITES WITH POSITIVE RHEUMATOID FACTOR (MULTI): Primary | ICD-10-CM

## 2024-02-19 PROCEDURE — 3079F DIAST BP 80-89 MM HG: CPT | Performed by: INTERNAL MEDICINE

## 2024-02-19 PROCEDURE — 99214 OFFICE O/P EST MOD 30 MIN: CPT | Performed by: INTERNAL MEDICINE

## 2024-02-19 PROCEDURE — 20605 DRAIN/INJ JOINT/BURSA W/O US: CPT | Performed by: INTERNAL MEDICINE

## 2024-02-19 PROCEDURE — 3074F SYST BP LT 130 MM HG: CPT | Performed by: INTERNAL MEDICINE

## 2024-02-19 PROCEDURE — 1036F TOBACCO NON-USER: CPT | Performed by: INTERNAL MEDICINE

## 2024-02-19 RX ORDER — METHYLPREDNISOLONE 4 MG/1
TABLET ORAL
Qty: 21 TABLET | Refills: 0 | Status: SHIPPED | OUTPATIENT
Start: 2024-02-19

## 2024-02-19 RX ORDER — LIDOCAINE HYDROCHLORIDE 5 MG/ML
0.5 INJECTION, SOLUTION INFILTRATION; PERINEURAL
Status: COMPLETED | OUTPATIENT
Start: 2024-02-19 | End: 2024-02-19

## 2024-02-19 RX ADMIN — LIDOCAINE HYDROCHLORIDE 0.5 ML: 5 INJECTION, SOLUTION INFILTRATION; PERINEURAL at 13:41

## 2024-02-23 RX ORDER — EZETIMIBE 10 MG/1
TABLET ORAL
COMMUNITY
End: 2024-05-24 | Stop reason: WASHOUT

## 2024-02-23 RX ORDER — PHENAZOPYRIDINE HYDROCHLORIDE 200 MG/1
TABLET, FILM COATED ORAL
COMMUNITY
Start: 2021-08-20

## 2024-02-23 RX ORDER — DICLOFENAC SODIUM 10 MG/G
100 GEL TOPICAL
COMMUNITY
Start: 2019-09-19

## 2024-02-23 RX ORDER — NIRMATRELVIR AND RITONAVIR 300-100 MG
KIT ORAL
COMMUNITY
Start: 2022-08-24

## 2024-02-23 RX ORDER — PENICILLIN V POTASSIUM 500 MG/1
TABLET, FILM COATED ORAL
COMMUNITY
Start: 2020-11-27

## 2024-02-23 RX ORDER — FAMOTIDINE 20 MG/1
TABLET, FILM COATED ORAL
COMMUNITY
Start: 2021-09-08

## 2024-02-23 RX ORDER — SODIUM FLUORIDE 6 MG/ML
PASTE, DENTIFRICE DENTAL
COMMUNITY
Start: 2017-10-30

## 2024-02-26 DIAGNOSIS — I10 HYPERTENSION, UNSPECIFIED TYPE: ICD-10-CM

## 2024-02-26 RX ORDER — METOPROLOL SUCCINATE 50 MG/1
50 TABLET, EXTENDED RELEASE ORAL DAILY
Qty: 90 TABLET | Refills: 3 | Status: SHIPPED | OUTPATIENT
Start: 2024-02-26 | End: 2025-02-25

## 2024-02-28 DIAGNOSIS — M54.16 LUMBAR RADICULOPATHY: ICD-10-CM

## 2024-03-01 ENCOUNTER — TELEMEDICINE (OUTPATIENT)
Dept: PRIMARY CARE | Facility: CLINIC | Age: 64
End: 2024-03-01
Payer: MEDICAID

## 2024-03-01 DIAGNOSIS — M50.30 DDD (DEGENERATIVE DISC DISEASE), CERVICAL: ICD-10-CM

## 2024-03-01 PROCEDURE — 99214 OFFICE O/P EST MOD 30 MIN: CPT | Performed by: NURSE PRACTITIONER

## 2024-03-01 PROCEDURE — 1036F TOBACCO NON-USER: CPT | Performed by: NURSE PRACTITIONER

## 2024-03-01 RX ORDER — PREGABALIN 100 MG/1
100 CAPSULE ORAL 2 TIMES DAILY
Qty: 60 CAPSULE | Refills: 2 | Status: SHIPPED | OUTPATIENT
Start: 2024-03-01 | End: 2024-03-29 | Stop reason: SDUPTHER

## 2024-03-01 RX ORDER — TRAMADOL HYDROCHLORIDE 50 MG/1
50 TABLET ORAL EVERY 4 HOURS PRN
Qty: 150 TABLET | Refills: 0 | Status: SHIPPED | OUTPATIENT
Start: 2024-03-01 | End: 2024-03-22 | Stop reason: SDUPTHER

## 2024-03-01 NOTE — PROGRESS NOTES
Subjective   Cynthia Cantrell is a 63 y.o. female who presents for No chief complaint on file..  HPI  Doing a virtual visit  Pt is having terrible pain still  Is in pain management as far as injections  Needs refills on Ultram and Lyrica  She was taking Ultram 50 mg x 5 per day with Dr Arvizu  Feels she needs to the higher dose of medication for a least next few weeks or month /pain stimulator  She stated that she is trying to keep active and exercise which in many ways makes pain worse but feels being inactive is not good either  No other med needs at this time  She occasionally takes the flexeril but makes her too tired  She will has narcan available/1 at home and carries one with her. Family/friends know that she is on a narcotic pain meds  All systems reviewed. Review of systems negative except for noted positives in HPI    Objective     There were no vitals taken for this visit.   Vital signs noted and reviewed.       Physical Exam    Not completed since virtual visit    Assessment/Plan   Problem List Items Addressed This Visit    None          Oaars reviewed  Needs pain control signed at next [facility] appt  Sent scripts over for Ultram/Lyrica  Is being followed by pain management for injections as well pain stimulator  Has DDD and multiple ortho spine surgeries  Essentially wheelchair dependent but does try to focus on keeping active  See back in 4-6 weeks for med refills

## 2024-03-08 ENCOUNTER — TELEPHONE (OUTPATIENT)
Dept: PRIMARY CARE | Facility: CLINIC | Age: 64
End: 2024-03-08
Payer: MEDICAID

## 2024-03-08 DIAGNOSIS — M50.30 DDD (DEGENERATIVE DISC DISEASE), CERVICAL: ICD-10-CM

## 2024-03-08 DIAGNOSIS — M54.16 LUMBAR RADICULOPATHY: ICD-10-CM

## 2024-03-22 RX ORDER — TRAMADOL HYDROCHLORIDE 50 MG/1
50 TABLET ORAL EVERY 4 HOURS PRN
Qty: 150 TABLET | Refills: 0 | Status: SHIPPED | OUTPATIENT
Start: 2024-03-22 | End: 2024-03-29 | Stop reason: SDUPTHER

## 2024-03-29 ENCOUNTER — OFFICE VISIT (OUTPATIENT)
Dept: PRIMARY CARE | Facility: CLINIC | Age: 64
End: 2024-03-29
Payer: MEDICAID

## 2024-03-29 VITALS
DIASTOLIC BLOOD PRESSURE: 90 MMHG | RESPIRATION RATE: 14 BRPM | HEIGHT: 63 IN | BODY MASS INDEX: 25.69 KG/M2 | HEART RATE: 80 BPM | OXYGEN SATURATION: 96 % | SYSTOLIC BLOOD PRESSURE: 121 MMHG | TEMPERATURE: 96.6 F

## 2024-03-29 DIAGNOSIS — Z00.00 HEALTHCARE MAINTENANCE: ICD-10-CM

## 2024-03-29 DIAGNOSIS — L98.9 SKIN LESION: ICD-10-CM

## 2024-03-29 DIAGNOSIS — M50.30 DDD (DEGENERATIVE DISC DISEASE), CERVICAL: ICD-10-CM

## 2024-03-29 DIAGNOSIS — G47.00 INSOMNIA, UNSPECIFIED TYPE: Primary | ICD-10-CM

## 2024-03-29 LAB
AMPHETAMINES UR QL SCN: NORMAL
BARBITURATES UR QL SCN: NORMAL
BENZODIAZ UR QL SCN: NORMAL
BZE UR QL SCN: NORMAL
CANNABINOIDS UR QL SCN: NORMAL
FENTANYL+NORFENTANYL UR QL SCN: NORMAL
METHADONE UR QL SCN: NORMAL
OPIATES UR QL SCN: NORMAL
OXYCODONE+OXYMORPHONE UR QL SCN: NORMAL
PCP UR QL SCN: NORMAL

## 2024-03-29 PROCEDURE — 3074F SYST BP LT 130 MM HG: CPT | Performed by: NURSE PRACTITIONER

## 2024-03-29 PROCEDURE — 3080F DIAST BP >= 90 MM HG: CPT | Performed by: NURSE PRACTITIONER

## 2024-03-29 PROCEDURE — 99215 OFFICE O/P EST HI 40 MIN: CPT | Performed by: NURSE PRACTITIONER

## 2024-03-29 PROCEDURE — 80307 DRUG TEST PRSMV CHEM ANLYZR: CPT | Performed by: NURSE PRACTITIONER

## 2024-03-29 PROCEDURE — 99215 OFFICE O/P EST HI 40 MIN: CPT | Mod: ZK | Performed by: NURSE PRACTITIONER

## 2024-03-29 RX ORDER — PREGABALIN 100 MG/1
100 CAPSULE ORAL 2 TIMES DAILY
Qty: 60 CAPSULE | Refills: 2 | Status: SHIPPED | OUTPATIENT
Start: 2024-03-29 | End: 2024-05-24 | Stop reason: SDUPTHER

## 2024-03-29 RX ORDER — TRAZODONE HYDROCHLORIDE 100 MG/1
100 TABLET ORAL NIGHTLY PRN
Qty: 270 TABLET | Refills: 2 | Status: SHIPPED | OUTPATIENT
Start: 2024-03-29

## 2024-03-29 RX ORDER — TRAMADOL HYDROCHLORIDE 50 MG/1
50 TABLET ORAL EVERY 4 HOURS PRN
Qty: 150 TABLET | Refills: 0 | Status: SHIPPED | OUTPATIENT
Start: 2024-03-29 | End: 2024-04-16 | Stop reason: SDUPTHER

## 2024-03-29 ASSESSMENT — PAIN SCALES - GENERAL: PAINLEVEL: 5

## 2024-03-29 NOTE — PATIENT INSTRUCTIONS
Meds renewed    Make sure to get mammogram after mid April 2024    Schedule appt with dermatology

## 2024-03-29 NOTE — PROGRESS NOTES
"Subjective   Cynthia Cantrell is a 63 y.o. female who presents for No chief complaint on file..  HPI  Here for mammogram script  Med renew  Needs to sign yearly pain control to include Lyrica  Doing okay  No concerns  Oaars reviewed  Discussed vaccination status  Is not up to date on many vaccinations due to allergies - anaphylaxis to eggs  Just got I'm back injection feels little better  This week  Not sure it will last but desperate to try anything  Has two skin lesions on breast - r upper and left inner upper  Small flat but scaly ac keratosis appearance in nature  Denied hx of skin ca  Needs trazadone renewed    All systems reviewed. Review of systems negative except for noted positives in HPI    Objective     /90   Pulse 80   Temp 35.9 °C (96.6 °F)   Resp 14   Ht 1.6 m (5' 3\")   SpO2 96%   BMI 25.69 kg/m²    Vital signs noted and reviewed.       Physical Exam  Vitals and nursing note reviewed.   Constitutional:       Appearance: Normal appearance.   HENT:      Head: Normocephalic and atraumatic.      Right Ear: Tympanic membrane normal.      Left Ear: Tympanic membrane normal.      Nose: Nose normal.      Mouth/Throat:      Mouth: Mucous membranes are moist.   Eyes:      Extraocular Movements: Extraocular movements intact.      Pupils: Pupils are equal, round, and reactive to light.   Cardiovascular:      Rate and Rhythm: Normal rate and regular rhythm.      Pulses: Normal pulses.      Heart sounds: Normal heart sounds.   Pulmonary:      Effort: Pulmonary effort is normal.      Breath sounds: Normal breath sounds.   Abdominal:      General: Bowel sounds are normal.      Palpations: Abdomen is soft.   Musculoskeletal:         General: Normal range of motion.      Cervical back: Normal range of motion and neck supple.   Skin:     General: Skin is warm and dry.      Capillary Refill: Capillary refill takes less than 2 seconds.   Neurological:      General: No focal deficit present.      Mental " Status: She is alert and oriented to person, place, and time.   Psychiatric:         Mood and Affect: Mood normal.     In wheelchair        Assessment/Plan   Problem List Items Addressed This Visit          Sleep    Insomnia - Primary    Relevant Medications    traZODone (Desyrel) 100 mg tablet     Other Visit Diagnoses       DDD (degenerative disc disease), cervical        Relevant Medications    traMADol (Ultram) 50 mg tablet    pregabalin (Lyrica) 100 mg capsule    Other Relevant Orders    Drug Screen 9 Panel, Blood with Reflex to Confirmation    Drug Screen, Urine With Reflex to Confirmation (Completed)    Healthcare maintenance        Relevant Orders    BI mammo bilateral screening tomosynthesis    Skin lesion        Relevant Orders    Referral to Dermatology                Takes 100 to 300 mg po trazadone daily depending how she feels  Will try to determine egg allergy with regard to vaccinations

## 2024-04-01 ENCOUNTER — HOSPITAL ENCOUNTER (OUTPATIENT)
Dept: RADIOLOGY | Facility: HOSPITAL | Age: 64
Discharge: HOME | End: 2024-04-01
Payer: MEDICAID

## 2024-04-01 VITALS
OXYGEN SATURATION: 96 % | RESPIRATION RATE: 16 BRPM | SYSTOLIC BLOOD PRESSURE: 101 MMHG | TEMPERATURE: 98.1 F | DIASTOLIC BLOOD PRESSURE: 65 MMHG | HEART RATE: 80 BPM

## 2024-04-01 DIAGNOSIS — M54.16 LUMBAR RADICULOPATHY: ICD-10-CM

## 2024-04-01 PROCEDURE — 2500000004 HC RX 250 GENERAL PHARMACY W/ HCPCS (ALT 636 FOR OP/ED): Performed by: ANESTHESIOLOGY

## 2024-04-01 PROCEDURE — 64483 NJX AA&/STRD TFRM EPI L/S 1: CPT | Performed by: ANESTHESIOLOGY

## 2024-04-01 PROCEDURE — 64483 NJX AA&/STRD TFRM EPI L/S 1: CPT | Mod: 50 | Performed by: ANESTHESIOLOGY

## 2024-04-01 RX ORDER — MIDAZOLAM HYDROCHLORIDE 1 MG/ML
INJECTION INTRAMUSCULAR; INTRAVENOUS
Status: COMPLETED | OUTPATIENT
Start: 2024-04-01 | End: 2024-04-01

## 2024-04-01 RX ADMIN — MIDAZOLAM HYDROCHLORIDE 2 MG: 1 INJECTION INTRAMUSCULAR; INTRAVENOUS at 07:17

## 2024-04-01 ASSESSMENT — PAIN - FUNCTIONAL ASSESSMENT
PAIN_FUNCTIONAL_ASSESSMENT: 0-10

## 2024-04-01 ASSESSMENT — PAIN SCALES - GENERAL
PAINLEVEL_OUTOF10: 6
PAINLEVEL_OUTOF10: 5 - MODERATE PAIN
PAINLEVEL_OUTOF10: 6
PAINLEVEL_OUTOF10: 6
PAINLEVEL_OUTOF10: 5 - MODERATE PAIN

## 2024-04-01 NOTE — PROCEDURES
Preoperative diagnosis/postoperative diagnosis: Postlaminectomy syndrome, recurrent stenosis, lumbar radiculitis bilaterally  Procedure: Bilateral L4 lumbar transforaminal epidural steroid injection under fluoroscopic guidance  Surgeon: Miriam Winston  Assistant:  Fellow, Rell Huitron  Anesthesia: Local plus IV sedation, midazolam 2 mg  Complications: Apparently none    Clinical note: Cynthia is a patient with a history of back and leg symptoms which were more right-sided but now bilateral.  She saw her surgeon back who felt further conservative measures could be considered versus a major surgery.  The patient has elected to move forward with conservative measures we will repeat an epidural and she is being approved for spinal cord stimulation.    Procedure note: The patient was met in the preoperative holding area after risks benefits and alternatives to procedure were discussed with the patient, informed consent was obtained. Patient brought back to the procedure room and placed in the prone position on the fluoroscopy table. Area over the back was exposed, prepped, draped, in the usual sterile fashion.  Skin and subcutaneous tissues to the neuroforamen was anesthetized using 0.5% lidocaine.  90 mm 22-gauge Sprotte needles were inserted in the skin and advanced into the foramen. Needle tip position was confirmed in AP oblique and lateral view.  Contrast was injected which showed appropriate epidural spread on the right side but on the left side there was some discal spread and the needle was retracted about 2 mm.  In the final position bilaterally there was appropriate epidural spread and no noted discal uptake, no intravascular or intrathecal uptake. A total of 2 mL of 0.5% lidocaine mixed with 10 mg dexamethasone was injected in divided doses among the 2 needles. Needles removed, bandage applied, patient tolerated the procedure well with no immediate complications.    Once approved for spinal cord stimulation  we will call her and have her schedule.  She did pass psychological evaluation/clearance.

## 2024-04-01 NOTE — H&P
History Of Present Illness  Cynthia Cantrell is a 63 y.o. female presents for procedure state below. Endorses no changes in past medical history or medical health since last seen in clinic.     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    Surgical History  She has a past surgical history that includes Appendectomy (09/09/2016); Other surgical history (09/09/2016); Other surgical history (01/07/2021); and CT angio abdomen pelvis w and or wo IV IV contrast (4/11/2019).     Social History  She reports that she has never smoked. She has never used smokeless tobacco. She reports that she does not drink alcohol and does not use drugs.    Family History  Family History   Problem Relation Name Age of Onset    Other (Cardiac abnormality) Mother      Hypothyroidism Mother      Intracerebral hemorrhage Mother          Allergies  Egg, Furosemide, Peanut, Adalimumab, Amoxicillin-pot clavulanate, Capsaicin, Cephalosporins, Flaxseed, Gabapentin, Ibuprofen, Loperamide, Methotrexate, Milnacipran, Neomycin-polymyxin-hc, Propofol, Sulfa (sulfonamide antibiotics), and Capsaicin-menthol    Review of Symptoms:   Constitutional: Negative for chills, diaphoresis or fever  HENT: Negative for neck swelling  Eyes:.   Negative for eye pain  Respiratory:.  Negative for cough, shortness of breath or wheezing    Cardiovascular:.  Negative for chest pain or palpitations  Gastrointestinal:.  Negative for abdominal pain, nausea and vomiting  Genitourinary:.  Negative for urgency  Musculoskeletal:  Positive for back pain. Positive for joint pain. Denies falls within the past 3 months.  Skin: Negative for wounds or itching   Neurological: Negative for dizziness, seizures, loss of consciousness and weakness  Endo/Heme/Allergies: Does not bruise/bleed easily  Psychiatric/Behavioral: Negative for depression. The patient does not appear anxious.       PHYSICAL EXAM  Vitals signs reviewed  Constitutional:       General: Not in acute distress     Appearance: Normal appearance. Not ill-appearing.  HENT:     Head: Normocephalic and atraumatic  Eyes:     Conjunctiva/sclera: Conjunctivae normal  Cardiovascular:     Rate and Rhythm: Normal rate and regular rhythm  Pulmonary:     Effort: No respiratory distress  Abdominal:     Palpations: Abdomen is soft  Musculoskeletal: RANDHAWA  Skin:     General: Skin is warm and dry  Neurological:     General: No focal deficit present  Psychiatric:         Mood and Affect: Mood normal         Behavior: Behavior normal     Last Recorded Vitals  /80   Pulse 90   Temp 36.7 °C (98.1 °F) (Temporal)   Resp 16   SpO2 100%     Relevant Results  Current Outpatient Medications   Medication Instructions    abatacept (Orencia, with maltose,) 250 mg injection intravenous    albuterol 90 mcg/actuation inhaler 2 puffs, inhalation, Every 4 hours PRN    aspirin 81 mg EC tablet 1 tablet DAILY (route: oral)    atorvastatin (LIPITOR) 80 mg, oral, Nightly    cholecalciferol (Vitamin D-3) 50 MCG (2000 UT) tablet oral    cyclobenzaprine (FLEXERIL) 10 mg, oral, 3 times daily PRN    desloratadine (CLARINEX) 5 mg, oral, Daily PRN    diclofenac sodium (VOLTAREN) 100 g, transdermal    DULoxetine (CYMBALTA) 60 mg, oral, 2 times daily     EPINEPHrine (EPIPEN) 0.3 mg, intramuscular, Once as needed, As Directed    ezetimibe (Zetia) 10 mg tablet oral    famotidine (Pepcid) 20 mg tablet oral    fluoride, sodium, (PreviDent 5000 Dry Mouth) 1.1 % dental paste dental    fluorometholone (FML) 0.1 % ophthalmic suspension 1 drop, Both Eyes, 2 times daily    Lactobacillus acidophilus 100 million cell capsule oral    leflunomide (Arava) 20 mg tablet 1 tablet DAILY (route: oral)    methylPREDNISolone (Medrol Dospak) 4 mg tablets Follow schedule on package instructions    metoprolol succinate XL (TOPROL-XL) 50 mg, oral, Daily    naloxone (NARCAN) 4 mg, nasal, As needed, May repeat every 2-3 minutes if needed, alternating nostrils, until medical assistance becomes available.    nirmatrelvir-ritonavir (Paxlovid) 300 mg (150 mg x 2)-100 mg tablet therapy pack oral    omeprazole (PRILOSEC) 40 mg, oral, Daily    penicillin v potassium (Veetid) 500 mg tablet oral    phenazopyridine (Pyridium) 200 mg tablet oral    pilocarpine (Salagen) 5 mg tablet 1 tablet, oral, 2 times daily    polyvinyl alcohol (Liquifilm Tears) 1.4 % ophthalmic solution 1-2 drops, ophthalmic (eye), 4 times daily    pregabalin (LYRICA) 100 mg, oral, 2 times daily    traMADol (ULTRAM) 50 mg, oral, Every 4 hours PRN    traZODone (DESYREL) 100 mg, oral, Nightly PRN    Vitamin D3 50 mcg (2,000 unit) capsule oral, Daily         MR lumbar spine wo IV contrast 06/04/2023    Narrative  Interpreted By:  LORETTA ZABALA MD  MRN: [id number]  Patient Name: ANA SCRUGGS    STUDY:  MRI L-SPINE WO;  6/4/2023 1:05 pm    INDICATION:  pain  M54.16: Lumbar radiculopathy M47.816: Lumbar spondylosis.    COMPARISON:  03/10/2022    ACCESSION NUMBER(S):  [id number]    ORDERING CLINICIAN:  AR ARAUJO    TECHNIQUE:  Sagittal T2, sagittal STIR, sagittal T1, axial T2, axial T1 weighted  MRI images of the lumbar spine were obtained without intravenous  contrast administration.    FINDINGS:  There are  postoperative changes compatible with a interval L4  laminectomy as well as resection of the L3 spinous process.    There is a small amount of fluid noted within the subcutaneous fat  along the midline surgical tract extending from the L2 through L4/5  levels suggestive of a postoperative seroma.    The coronal  images again demonstrate a dextrocurvature of the  lumbar spine and levocurvature of the visualized lower thoracic spine.    There is again evidence of 2-3 mm of anterolisthesis of L4 on L5 as  well as 2 mm of retrolisthesis of L1 on L2, L2 on L3, and L3 on L4.    Degenerative signal changes are again noted along endplates  throughout the lumbar and visualized lower thoracic region.    There is interval increased nonspecific abnormal bone marrow signal  noted along the endplates at the L4/5 level noted be prominently  increased in signal on the STIR images and diminished in signal on  the T1 weighted images left greater than right which while  nonspecific is felt to likely be degenerative in origin although a  component of superimposed nonspecific bone marrow edema conceivably  posttraumatic or inflammatory in origin could give a similar MRI  appearance and therefore can not be completely excluded.    The visualized spinal cord demonstrates no signal abnormality within  it. The conus medullaris terminates at the inferior L2 level.    There is new nonspecific clumping of nerve roots of the cauda equina  within the lumbar and sacral region with the lobulated clumped nerve  roots of the cauda equina peripherally positioned within the thecal  sac within the lower lumbar/sacral region. While nonspecific, the  findings raise the possibility of underlying arachnoiditis.    At the L5/S1 level,  there is posterior osteophytic spurring and  posterior disc bulge along with degenerative facet changes  contributing to mild overall narrowing of the thecal sac within the  spinal canal. There is severe right and  moderate left-sided neural  foraminal narrowing.    At the L4/L5 level,  there are postoperative changes compatible with  an interval L4 laminectomy. There is 2-3 mm of anterolisthesis of L4  on L5, a posterior disc bulge with superimposed posterior disc  herniation with extruded disc material extending cranially along the  posterior margin of the L4 vertebral body which in combination with  hypertrophic degenerative facet changes and ligamentum flavum  hypertrophy contributes to severe spinal canal narrowing there is  severe left and moderate right-sided neural foraminal narrowing.    At the L3/L4 level,  there is 2 mm of retrolisthesis of L3 on L4,  posterior osteophytic spurring, and a posterior disc bulge with a  superimposed left-sided disc herniation along with degenerative facet  changes contributing to mild overall narrowing of the thecal sac  within the spinal canal. There is severe left and moderate  right-sided neural foraminal narrowing.    At the L2/L3 level,  there is 2 mm of retrolisthesis of L2 on L3,  posterior osteophytic spurring and posterior disc bulge along with  degenerative facet changes and ligamentum flavum hypertrophy. There  is mild prominence of the epidural fat posteriorly within the spinal  canal. The combination of findings contributes to moderate narrowing  of the thecal sac in the AP dimension within the spinal canal. There  is mild-to-moderate left and mild right-sided neural foraminal  narrowing.    At the L1/L2 level,  there is 2 mm of retrolisthesis of L1 on L2,  posterior osteophytic spurring, and a posterior disc bulge along with  degenerative facet changes contributing to mild spinal canal  narrowing. There is moderate to severe right and mild left-sided  neural foraminal narrowing.    At the T12/L1 level,  there is a right paracentral disc herniation  with extruded disc material extending cranially along the posterior  margin of the T12 vertebral body measuring approximally  4 mm in AP  dimension compared with 3 mm on the prior study dated 03/10/2022  along with mild degenerative facet changes. There is effacement of  right ventral subarachnoid space with minimal flattening the right  ventral margin of the spinal cord which is draped over the disc  herniation. There is mild encroachment upon the right neural foramen.  There is no significant left-sided neural foraminal narrowing.    At the T11/12 level, there is a left-sided disc herniation measuring  approximally 2-3 mm in AP dimension contributing to mild encroachment  upon the spinal canal. There is no significant neural foraminal  narrowing.    At the T10/11 level, the sagittal images demonstrate degenerative  facet changes left greater than right along with a mild posterior  disc. There is encroachment upon the neural foramen left greater than  right. No axial images were obtained through this level limiting  further evaluation.    Impression  There are postoperative changes compatible with a interval L4  laminectomy as well as resection of the L3 spinous process. There is  a small amount of fluid noted within the subcutaneous fat along the  midline surgical tract extending from the L2 through L4/5 levels  suggestive of a postoperative seroma.    The coronal  images again demonstrate a dextrocurvature of the  lumbar spine and levocurvature of the visualized lower thoracic spine.    There is again evidence of 2-3 mm of anterolisthesis of L4 on L5 as  well as 2 mm of retrolisthesis of L1 on L2, L2 on L3, and L3 on L4.    There is interval increased nonspecific abnormal bone marrow signal  noted along the endplates at the L4/5 level noted be prominently  increased in signal on the STIR images and diminished in signal on  the T1 weighted images left greater than right which while  nonspecific is felt to likely be degenerative in origin although a  component of superimposed nonspecific bone marrow edema conceivably  posttraumatic  or inflammatory in origin could give a similar MRI  appearance and therefore can not be completely excluded.    There is multilevel spondylosis. There are varying degrees of spinal  canal and neural foraminal narrowing as described above.    The study was interpreted at [facility].      MR LUMBAR SPINE WO IV CONTRAST 03/10/2022    Narrative  MRN: [id number]  Patient Name: ANA SCRUGGS    STUDY:  MRI L-SPINE WO;  3/10/2022 8:38 am    INDICATION:  low back pain  M47.816: Lumbar spondylosis.    COMPARISON:  March 28, 2018    ACCESSION NUMBER(S):  [id number]    ORDERING CLINICIAN:  AR ARAUJO    TECHNIQUE:  Sagittal T1, T2, STIR, axial T1 and T2 weighted images of the lumbar  spine were acquired.    FINDINGS:  Alignment: Limited coronal  images suggest a dextroconvex  curvature of the lumbar spine. There is mild, grade 1 retrolisthesis  L1 on L2, L2 on L3, and L3 on L4 and mild, grade 1 anterolisthesis of  L4 on L5.    Vertebrae/Intervertebral Discs: The vertebral bodies demonstrate  expected height.  There has been interval worsening of multilevel  degenerative endplate signal changes which are most pronounced at  L1-2, L3-4, and L5-S1. There is abnormal increased signal on STIR  imaging within the left L2 and L4 pedicles favored to be due to  degenerative or stress related changes. There is multilevel disc  desiccation and degenerative endplate spurring with loss of disc  height most severe at L5-S1 and L1-2 with significant interval  worsening at L1-2. The marrow signal is otherwise diffusely  inhomogeneous throughout on T1 weighted imaging which is nonspecific.  There are scattered hemangiomas or focal fatty rests. There is an  element of congenital narrowing of the spinal canal due to  developmentally short pedicles.    Conus: The lower thoracic cord appears unremarkable. The conus  terminates at L2 which is low lying.    T11-12: There is a tiny left paracentral  disc protrusion with minimal  impression on the left ventral subarachnoid space.  T12-L1: Mild degenerative facet changes and a bilobed posterior disc  protrusion partially efface the ventral subarachnoid space and  contribute to mild central canal stenosis.  L1-2: Mild facet and ligamentum flavum hypertrophy are demonstrated.  There is circumferential disc bulging and endplate spurring  contributing to mild central canal stenosis. There is severe right  and moderate left-sided neuroforaminal narrowing due to  intraforaminal/far right lateral disc protrusion. There has been  interval worsening from the previous examination.  L2-3: Facet and ligamentum flavum hypertrophy and prominent posterior  epidural fat impress on the posterior thecal sac. There is  circumferential disc bulging with a superimposed left paracentral  disc protrusion. There is at least moderate narrowing of the thecal  sac with crowding of the nerve roots of the cauda equina. There is  mild, left greater than right neuroforaminal stenosis. There has been  interval worsening from the previous exam.  L3-4: Facet and ligamentum flavum hypertrophy and prominent posterior  epidural fat impress on the dorsal thecal sac. Circumferential disc  bulging and endplate spurring are also demonstrated. There is  narrowing of the lateral recesses and of the thecal sac with marked  crowding of the nerve roots of the cauda equina. Endplate spurring  and intraforaminal/far lateral disc protrusions produce severe left  and mild right-sided neuroforaminal stenosis. There has been  significant interval worsening from the previous examination.  L4-5: Facet and ligamentum flavum hypertrophy and prominent posterior  epidural fat impress on the dorsal thecal sac. Fluid in the left  facet is nonspecific but favored to be degenerative in nature. There  is circumferential disc bulging with a superimposed broad-based disc  protrusion producing effacement of the lateral  recesses. There is  marked crowding of nerve roots of the cauda equina within the thecal  sac. Intraforaminal/far lateral disc protrusions produce severe left  and mild right-sided neuroforaminal stenosis. The neuroforaminal  narrowing is relatively similar from the previous examination.  However, mass effect on the thecal sac has progressed.  L5-S1: Facet and ligamentum flavum hypertrophy are demonstrated.  There is circumferential disc bulging and endplate spurring with  effacement of the lateral recesses and mild central canal stenosis.  There is severe, right greater than left neuroforaminal narrowing due  to endplate spurring and disc bulging, similar to mildly progressed  from the previous examination. There is irregularity and kinking of  nerve roots of the cauda equina in the thecal sac from L5 through S1.    There is a small, inadequately evaluated right renal lesion favored  to represent a cyst. There are colonic diverticula.    Impression  1. Advanced, multilevel degenerative changes of the lumbar spine  superimposed on congenital narrowing of the spinal canal and epidural  lipomatosis with interval progression of degenerative disease from  2018. Mass effect on the thecal sac and crowding of the nerve roots  of the cauda equina are most severe at L4-5, L3-4, and L2-3.  2. Grade 1 anterolisthesis of L4 on L5 is new from the previous  examination.  3. Dextroscoliosis.     No image results found.       1. Lumbar radiculopathy  FL pain management    FL pain management    Transforaminal    Transforaminal           ASSESSMENT/PLAN  Cyntiha Cantrell is a 63 y.o. female presents for bilateral L4 transforaminal epidural steroid injection     Our plan is as follows:  - Follow in pain clinic  - Continue to participate in physical therapy as well as physician directed home exercises  - Continue pain medications as prescribed       Rell Huitron MD

## 2024-04-08 ENCOUNTER — HOSPITAL ENCOUNTER (OUTPATIENT)
Dept: RADIOLOGY | Facility: HOSPITAL | Age: 64
Discharge: HOME | End: 2024-04-08
Payer: MEDICAID

## 2024-04-08 VITALS — BODY MASS INDEX: 25.16 KG/M2 | WEIGHT: 142 LBS | HEIGHT: 63 IN

## 2024-04-08 DIAGNOSIS — Z00.00 HEALTHCARE MAINTENANCE: ICD-10-CM

## 2024-04-08 PROCEDURE — 77067 SCR MAMMO BI INCL CAD: CPT

## 2024-04-08 PROCEDURE — 77067 SCR MAMMO BI INCL CAD: CPT | Performed by: RADIOLOGY

## 2024-04-08 PROCEDURE — 77063 BREAST TOMOSYNTHESIS BI: CPT | Performed by: RADIOLOGY

## 2024-04-12 ENCOUNTER — HOSPITAL ENCOUNTER (OUTPATIENT)
Dept: RADIOLOGY | Facility: HOSPITAL | Age: 64
Discharge: HOME | End: 2024-04-12
Payer: MEDICAID

## 2024-04-12 VITALS
OXYGEN SATURATION: 96 % | DIASTOLIC BLOOD PRESSURE: 71 MMHG | TEMPERATURE: 97.7 F | HEIGHT: 63 IN | SYSTOLIC BLOOD PRESSURE: 120 MMHG | HEART RATE: 74 BPM | RESPIRATION RATE: 16 BRPM | WEIGHT: 142 LBS | BODY MASS INDEX: 25.16 KG/M2

## 2024-04-12 DIAGNOSIS — M54.16 LUMBAR RADICULOPATHY: ICD-10-CM

## 2024-04-12 PROCEDURE — 63650 IMPLANT NEUROELECTRODES: CPT | Performed by: ANESTHESIOLOGY

## 2024-04-12 PROCEDURE — C1778 LEAD, NEUROSTIMULATOR: HCPCS

## 2024-04-12 PROCEDURE — 2500000004 HC RX 250 GENERAL PHARMACY W/ HCPCS (ALT 636 FOR OP/ED): Performed by: ANESTHESIOLOGY

## 2024-04-12 PROCEDURE — 95972 ALYS CPLX SP/PN NPGT W/PRGRM: CPT | Performed by: ANESTHESIOLOGY

## 2024-04-12 PROCEDURE — 99153 MOD SED SAME PHYS/QHP EA: CPT | Performed by: ANESTHESIOLOGY

## 2024-04-12 PROCEDURE — 2780000003 HC OR 278 NO HCPCS

## 2024-04-12 PROCEDURE — 99152 MOD SED SAME PHYS/QHP 5/>YRS: CPT | Performed by: ANESTHESIOLOGY

## 2024-04-12 PROCEDURE — 2500000004 HC RX 250 GENERAL PHARMACY W/ HCPCS (ALT 636 FOR OP/ED): Mod: JZ | Performed by: STUDENT IN AN ORGANIZED HEALTH CARE EDUCATION/TRAINING PROGRAM

## 2024-04-12 RX ORDER — SODIUM CHLORIDE 9 MG/ML
100 INJECTION, SOLUTION INTRAVENOUS CONTINUOUS
Status: DISCONTINUED | OUTPATIENT
Start: 2024-04-12 | End: 2024-04-13 | Stop reason: HOSPADM

## 2024-04-12 RX ORDER — FENTANYL CITRATE 50 UG/ML
INJECTION, SOLUTION INTRAMUSCULAR; INTRAVENOUS
Status: COMPLETED | OUTPATIENT
Start: 2024-04-12 | End: 2024-04-12

## 2024-04-12 RX ORDER — MIDAZOLAM HYDROCHLORIDE 1 MG/ML
INJECTION INTRAMUSCULAR; INTRAVENOUS
Status: COMPLETED | OUTPATIENT
Start: 2024-04-12 | End: 2024-04-12

## 2024-04-12 RX ORDER — CLINDAMYCIN PHOSPHATE 600 MG/50ML
600 INJECTION, SOLUTION INTRAVENOUS ONCE
Status: COMPLETED | OUTPATIENT
Start: 2024-04-12 | End: 2024-04-12

## 2024-04-12 RX ADMIN — FENTANYL CITRATE 50 MCG: 50 INJECTION, SOLUTION INTRAMUSCULAR; INTRAVENOUS at 10:15

## 2024-04-12 RX ADMIN — FENTANYL CITRATE 50 MCG: 50 INJECTION, SOLUTION INTRAMUSCULAR; INTRAVENOUS at 10:26

## 2024-04-12 RX ADMIN — MIDAZOLAM HYDROCHLORIDE 2 MG: 1 INJECTION INTRAMUSCULAR; INTRAVENOUS at 10:14

## 2024-04-12 RX ADMIN — CLINDAMYCIN IN 5 PERCENT DEXTROSE 600 MG: 12 INJECTION, SOLUTION INTRAVENOUS at 09:30

## 2024-04-12 RX ADMIN — SODIUM CHLORIDE 100 ML/HR: 9 INJECTION, SOLUTION INTRAVENOUS at 09:30

## 2024-04-12 ASSESSMENT — PAIN - FUNCTIONAL ASSESSMENT
PAIN_FUNCTIONAL_ASSESSMENT: 0-10

## 2024-04-12 ASSESSMENT — PAIN SCALES - GENERAL
PAINLEVEL_OUTOF10: 7
PAINLEVEL_OUTOF10: 5 - MODERATE PAIN
PAINLEVEL_OUTOF10: 0 - NO PAIN
PAINLEVEL_OUTOF10: 0 - NO PAIN
PAINLEVEL_OUTOF10: 5 - MODERATE PAIN
PAINLEVEL_OUTOF10: 0 - NO PAIN
PAINLEVEL_OUTOF10: 0 - NO PAIN
PAINLEVEL_OUTOF10: 5 - MODERATE PAIN

## 2024-04-12 ASSESSMENT — PAIN DESCRIPTION - DESCRIPTORS: DESCRIPTORS: STABBING;SHARP

## 2024-04-12 NOTE — H&P
History Of Present Illness  Cynthia Cantrell is a 63 y.o. female presenting with bilateral back pain and radiculopathy.    Past Medical History  Past Medical History:   Diagnosis Date    Encounter for general adult medical examination without abnormal findings 10/29/2018    Encounter for preventive health examination    Fibromyalgia 11/12/2021    Fibromyalgia    Old myocardial infarction 11/16/2020    Old myocardial infarct    Other overlap syndromes (Multi) 10/10/2016    Mixed connective tissue disease    Personal history of other diseases of the digestive system     History of esophageal reflux    Personal history of other diseases of the musculoskeletal system and connective tissue     History of arthritis    Personal history of other infectious and parasitic diseases 04/28/2016    History of herpes zoster    Rheumatoid arthritis, unspecified (Multi) 05/16/2022    Rheumatoid arthritis    Sjogren syndrome, unspecified (Multi)     History of Sjogren's disease    Unspecified perforation of tympanic membrane, right ear 07/29/2016    Perforated tympanic membrane, right    Urinary tract infection, site not specified 10/21/2022    Acute lower UTI    Urinary tract infection, site not specified 08/20/2021    Acute lower UTI       Surgical History  Past Surgical History:   Procedure Laterality Date    APPENDECTOMY  09/09/2016    Appendectomy    CT ABDOMEN PELVIS ANGIOGRAM W AND/OR WO IV CONTRAST  4/11/2019    CT ABDOMEN PELVIS ANGIOGRAM W AND/OR WO IV CONTRAST 4/11/2019 [facility] CLINICAL LEGACY    OTHER SURGICAL HISTORY  09/09/2016    Mastoidectomy, Revision - With Tympanoplasty    OTHER SURGICAL HISTORY  01/07/2021    Foot surgery        Social History  She reports that she has never smoked. She has never used smokeless tobacco. She reports that she does not drink alcohol and does not use drugs.    Family History  Family History   Problem Relation Name Age of Onset    Other (Cardiac abnormality) Mother       "Hypothyroidism Mother      Intracerebral hemorrhage Mother          Allergies  Egg, Furosemide, Peanut, Adalimumab, Amoxicillin-pot clavulanate, Capsaicin, Cephalosporins, Flaxseed, Gabapentin, Ibuprofen, Loperamide, Methotrexate, Milnacipran, Neomycin-polymyxin-hc, Propofol, Sulfa (sulfonamide antibiotics), and Capsaicin-menthol    Review of Systems   13 point ROS done and negative except for the above.   Physical Exam     General: NAD, well nourished   Eyes: Non-icteric sclera, EOMI  Ears, Nose, Mouth, and Throat: External ears and nose appear to be without deformity or rash. No lesions or masses noted. Hearing is grossly intact.   Neck: Trachea midline  Respiratory: Nonlabored breathing   Cardiovascular: No JVD  Skin: No rashes or open lesions/ulcers identified on skin.    Last Recorded Vitals  Blood pressure 120/77, pulse 80, temperature 36.6 °C (97.9 °F), temperature source Temporal, resp. rate 18, height 1.6 m (5' 3\"), weight 64.4 kg (142 lb), SpO2 94%.    Relevant Results           Assessment/Plan   Problem List Items Addressed This Visit             ICD-10-CM       Neuro    Lumbar radiculopathy M54.16    Relevant Orders    Stimulator Lead Trial    FL pain management       Patient is a 63-year-old female with a past medical steroid significant for laminectomy at L3-L4-L5, who also has severe central stenosis at L4/L5 as well as moderate/severe stenosis at L2/L3, presenting for spinal cord stimulator trial.  Conus terminates at base of L2.  Patient takes aspirin low-dose, however is high risk for cessation per cardiology and therefore we will proceed with mildly elevated risk of hematoma.    Risks, benefits, alternatives to the procedure were discussed in detail and patient was amenable to proceeding.    Aldo Deluca MD    "

## 2024-04-12 NOTE — PROCEDURES
Preoperative diagnosis:  Failed back   Postoperative diagnosis:  Failed back  Procedure: [employer] spinal cord stimulator trial under fluoroscopic guidance, complex intraoperative and postoperative programming, 2 leads, Infinion  Surgeon: Miriam Winston  Assistant:  Fellow, Ced Deluca  Anesthesia: Local, IV sedation 100 mcg of fentanyl and 2 mg of midazolam  Complications: Apparently none    Clinical note: Cynthia is a 63-year-old female with a history of back and leg symptoms, known prior back surgery with ongoing symptomatology.  She potentially have further surgery which would be rather extensive but her neurosurgeon advised that it is reasonable at this time to pursue a spinal cord stimulator trial.  She would like to exhaust all lesser invasive measures then thoracolumbar surgery.    Procedure note: The patient was met in the preoperative holding area after risks benefits and alternatives to procedure were discussed with the patient, informed consent was obtained. Patient brought back to the procedure room and placed in the prone position on the fluoroscopy table. Area over the back was exposed, prepped, draped, in the usual sterile fashion.  Skin and subcutaneous tissues to the lumbar intralaminar space was anesthetized using 0.5% lidocaine.  A 14-gauge modified Tuohy needle was inserted and skin and advanced in a paramedian approach. The entry point was at T12-L1. The first needle was placed on the left and a glass syringe was used to achieved epidural space using loss resistance technique. The lead was placed and advanced superiorly. Second needle was placed behind the first at the same interspace. A glass syringe was used to achieve the epidural space using loss resistance technique in the lateral view. The second lead was advanced and threaded up. They were placed to the mid portion of T8. Both leads were visualized in the posterior epidural space. Intraoperative programming was completed  and was covering all of the patient's areas of pain. At that point the stylets and needles were removed. The leads were secured to the skin using clik loc anchors. They were tightened down and secured to the skin using sutures. Mastisol, Steri-Strips and sterile dressing was placed. Patient tolerated the procedure well with no immediate complications.

## 2024-04-16 DIAGNOSIS — M50.30 DDD (DEGENERATIVE DISC DISEASE), CERVICAL: ICD-10-CM

## 2024-04-18 ENCOUNTER — OFFICE VISIT (OUTPATIENT)
Dept: PAIN MEDICINE | Facility: HOSPITAL | Age: 64
End: 2024-04-18
Payer: MEDICAID

## 2024-04-18 DIAGNOSIS — M25.552 PAIN OF LEFT HIP: ICD-10-CM

## 2024-04-18 PROCEDURE — 99024 POSTOP FOLLOW-UP VISIT: CPT | Performed by: ANESTHESIOLOGY

## 2024-04-18 NOTE — PROGRESS NOTES
Chief complaint: Hip pain and spinal cord stimulator trial follow-up    HPI   Cynthia is a 63-year-old female with a history of prior back surgery who presents today for follow-up after her spinal cord stimulator trial.  The patient says that she had 75 to 85% reduction in pain and improved function.  She was able to do more walking, activity, went tango dancing due to her decreased pain and ability to function better with a spinal cord stimulator in place.  The patient states that she would like to move forward with a permanent implant.    Additionally, the patient had left-sided hip pain and had a prior injection in December of last year which she says afforded her more than 3 and half months relief, more than 75% relief.  She would like to pursue a repeat intra-articular hip injection.  External rotation of the hip reproduces the pain.  Ambulation can make the hip pain worse.    ROS: 13 point review of systems is complete and is negative listed above in HPI    Imaging:  Prior x-rays have shown osteoarthritic changes in the left hip, mild to moderate    Physical Exam:  Gen.: No acute distress  Skin: Leads removed with tip intact, all suture removed.  Bandage placed at the site, no signs of infection.  Musculoskeletal: Positive Jaime on the left side    Impression/Plan:  63-year-old female with a history of prior back surgery and she saw her surgeon back in the office to see if further surgery would be warranted however they recommended at this time to pursue spinal cord stimulation as a reasonable next step.  She had 75 to 85% reduction in her back and leg pain and overall notes that her left-sided leg pain which is very bothersome resolved completely.  She would like to pursue a permanent implant.    Left-sided hip pain has also returned after prior injection done in December of last year and she would like to pursue a repeat procedure.    -Plan for left-sided intra-articular hip injection under fluoroscopic  guidance.  Procedure, risk, benefits, alternatives have been reviewed.    -Will plan for spinal cord stimulator implant.  We showed her the actual battery that would be implanted.  We reviewed the procedure, risk, benefits, alternatives discussed the surgery in particular.  Once we have authorization we will call her to get scheduled.  We did discuss the postprocedure restrictions and she is aware of limitations following surgery for the first 6 to 8 weeks which would be no major bending, lifting, twisting.

## 2024-04-19 RX ORDER — TRAMADOL HYDROCHLORIDE 50 MG/1
50 TABLET ORAL EVERY 4 HOURS PRN
Qty: 150 TABLET | Refills: 0 | Status: SHIPPED | OUTPATIENT
Start: 2024-04-19 | End: 2024-05-24 | Stop reason: SDUPTHER

## 2024-04-29 ENCOUNTER — PREP FOR PROCEDURE (OUTPATIENT)
Dept: PAIN MEDICINE | Facility: HOSPITAL | Age: 64
End: 2024-04-29
Payer: MEDICAID

## 2024-04-29 DIAGNOSIS — Z79.2 PROPHYLACTIC ANTIBIOTIC: ICD-10-CM

## 2024-04-29 DIAGNOSIS — M48.062 LUMBAR STENOSIS WITH NEUROGENIC CLAUDICATION: Primary | ICD-10-CM

## 2024-04-29 RX ORDER — MUPIROCIN 20 MG/G
OINTMENT TOPICAL 2 TIMES DAILY
Qty: 15 G | Refills: 0 | Status: SHIPPED | OUTPATIENT
Start: 2024-04-29 | End: 2024-05-04

## 2024-04-29 RX ORDER — CHLORHEXIDINE GLUCONATE 40 MG/ML
SOLUTION TOPICAL
Qty: 118 ML | Refills: 0 | Status: SHIPPED | OUTPATIENT
Start: 2024-04-29

## 2024-05-02 ENCOUNTER — TELEPHONE (OUTPATIENT)
Dept: PRIMARY CARE | Facility: CLINIC | Age: 64
End: 2024-05-02
Payer: MEDICAID

## 2024-05-10 ENCOUNTER — HOSPITAL ENCOUNTER (OUTPATIENT)
Dept: RADIOLOGY | Facility: HOSPITAL | Age: 64
Discharge: HOME | End: 2024-05-10
Payer: MEDICAID

## 2024-05-10 VITALS
OXYGEN SATURATION: 97 % | DIASTOLIC BLOOD PRESSURE: 87 MMHG | RESPIRATION RATE: 16 BRPM | HEART RATE: 81 BPM | TEMPERATURE: 97.9 F | SYSTOLIC BLOOD PRESSURE: 110 MMHG

## 2024-05-10 DIAGNOSIS — M25.552 PAIN OF LEFT HIP: ICD-10-CM

## 2024-05-10 PROCEDURE — 2500000004 HC RX 250 GENERAL PHARMACY W/ HCPCS (ALT 636 FOR OP/ED): Performed by: ANESTHESIOLOGY

## 2024-05-10 PROCEDURE — 20610 DRAIN/INJ JOINT/BURSA W/O US: CPT | Mod: LT | Performed by: ANESTHESIOLOGY

## 2024-05-10 PROCEDURE — 20610 DRAIN/INJ JOINT/BURSA W/O US: CPT | Performed by: ANESTHESIOLOGY

## 2024-05-10 PROCEDURE — 77003 FLUOROGUIDE FOR SPINE INJECT: CPT | Performed by: ANESTHESIOLOGY

## 2024-05-10 RX ORDER — MIDAZOLAM HYDROCHLORIDE 1 MG/ML
INJECTION INTRAMUSCULAR; INTRAVENOUS
Status: COMPLETED | OUTPATIENT
Start: 2024-05-10 | End: 2024-05-10

## 2024-05-10 RX ADMIN — MIDAZOLAM HYDROCHLORIDE 2 MG: 1 INJECTION INTRAMUSCULAR; INTRAVENOUS at 08:05

## 2024-05-10 ASSESSMENT — PAIN SCALES - GENERAL
PAINLEVEL_OUTOF10: 6
PAINLEVEL_OUTOF10: 0 - NO PAIN
PAINLEVEL_OUTOF10: 6
PAINLEVEL_OUTOF10: 5 - MODERATE PAIN
PAINLEVEL_OUTOF10: 8

## 2024-05-10 ASSESSMENT — PAIN - FUNCTIONAL ASSESSMENT
PAIN_FUNCTIONAL_ASSESSMENT: 0-10
PAIN_FUNCTIONAL_ASSESSMENT: WONG-BAKER FACES
PAIN_FUNCTIONAL_ASSESSMENT: WONG-BAKER FACES
PAIN_FUNCTIONAL_ASSESSMENT: 0-10
PAIN_FUNCTIONAL_ASSESSMENT: 0-10

## 2024-05-10 NOTE — PROCEDURES
Preoperative diagnosis/postoperative diagnosis: Left-sided joint space narrowing, arthritis  Procedure: Left-sided hip joint injection under fluoroscopic guidance  Surgeon: Miriam Winston  Assistant:  Fellow, Leslie Murillo  Anesthesia: Local, 2 mg midazolam for IV sedation  Complications: Apparently none    Clinical note: Cynthia is a patient with history of left-sided hip pain, groin pain, who has had prior hip injections with good relief.    Procedure note: The patient was met in the preoperative holding area after risks benefits and alternatives to procedure were discussed with the patient, informed consent was obtained. Patient brought back to the procedure room and she was able to position herself into the lateral decubitus position.  She was right side down in the lateral decubitus position on the fluoroscopy table. Area over the lateral hip joint was exposed, prepped, draped, in the usual sterile fashion.  Skin and subcutaneous tissues to the joint was anesthetized using 0.5% lidocaine.  A 23-gauge spinal needle was inserted in the skin and advanced.  Contrast was injected which showed appropriate spread, no intravascular uptake. A total of 3 mL of bupivacaine mixed with 40 mg of methylprednisolone. Needle removed, bandage applied, patient tolerated the procedure well with no immediate complications.

## 2024-05-10 NOTE — H&P
History Of Present Illness  Cynthia Cantrell is a 63 y.o. female presenting with hip pain.     Past Medical History  Past Medical History:   Diagnosis Date    Encounter for general adult medical examination without abnormal findings 10/29/2018    Encounter for preventive health examination    Fibromyalgia 11/12/2021    Fibromyalgia    Old myocardial infarction 11/16/2020    Old myocardial infarct    Other overlap syndromes (Multi) 10/10/2016    Mixed connective tissue disease    Personal history of other diseases of the digestive system     History of esophageal reflux    Personal history of other diseases of the musculoskeletal system and connective tissue     History of arthritis    Personal history of other infectious and parasitic diseases 04/28/2016    History of herpes zoster    Rheumatoid arthritis, unspecified (Multi) 05/16/2022    Rheumatoid arthritis    Sjogren syndrome, unspecified (Multi)     History of Sjogren's disease    Unspecified perforation of tympanic membrane, right ear 07/29/2016    Perforated tympanic membrane, right    Urinary tract infection, site not specified 10/21/2022    Acute lower UTI    Urinary tract infection, site not specified 08/20/2021    Acute lower UTI       Surgical History  Past Surgical History:   Procedure Laterality Date    APPENDECTOMY  09/09/2016    Appendectomy    CT ABDOMEN PELVIS ANGIOGRAM W AND/OR WO IV CONTRAST  4/11/2019    CT ABDOMEN PELVIS ANGIOGRAM W AND/OR WO IV CONTRAST 4/11/2019 [facility] CLINICAL LEGACY    OTHER SURGICAL HISTORY  09/09/2016    Mastoidectomy, Revision - With Tympanoplasty    OTHER SURGICAL HISTORY  01/07/2021    Foot surgery        Social History  She reports that she has never smoked. She has never used smokeless tobacco. She reports that she does not drink alcohol and does not use drugs.    Family History  Family History   Problem Relation Name Age of Onset    Other (Cardiac abnormality) Mother      Hypothyroidism Mother       Intracerebral hemorrhage Mother          Allergies  Egg, Furosemide, Peanut, Adalimumab, Amoxicillin-pot clavulanate, Capsaicin, Cephalosporins, Flaxseed, Gabapentin, Ibuprofen, Loperamide, Methotrexate, Milnacipran, Neomycin-polymyxin-hc, Propofol, Sulfa (sulfonamide antibiotics), and Capsaicin-menthol    Review of Systems   13 point ROS done and negative except for the above.   Physical Exam     General: NAD, well nourished   Eyes: Non-icteric sclera, EOMI  Ears, Nose, Mouth, and Throat: External ears and nose appear to be without deformity or rash. No lesions or masses noted. Hearing is grossly intact.   Neck: Trachea midline  Respiratory: Nonlabored breathing   Cardiovascular: No JVD  Skin: No rashes or open lesions/ulcers identified on skin.    Last Recorded Vitals  There were no vitals taken for this visit.    Relevant Results           Assessment/Plan   Problem List Items Addressed This Visit    None  Visit Diagnoses         Codes    Pain of left hip     M25.552    Relevant Orders    Joint Injection/Aspiration    FL pain management            Plan for left-sided hip intra-articular steroid injection.    Risks, benefits, alternatives to the procedure were discussed in detail and patient was amenable to proceeding.    Aldo Deluca MD

## 2024-05-14 NOTE — H&P
History Of Present Illness  Cynthia Cantrell is a 63 y.o. female presenting for spinal stimulator implantation.     Past Medical History  Past Medical History:   Diagnosis Date    Encounter for general adult medical examination without abnormal findings 10/29/2018    Encounter for preventive health examination    Fibromyalgia 11/12/2021    Fibromyalgia    Old myocardial infarction 11/16/2020    Old myocardial infarct    Other overlap syndromes (Multi) 10/10/2016    Mixed connective tissue disease    Personal history of other diseases of the digestive system     History of esophageal reflux    Personal history of other diseases of the musculoskeletal system and connective tissue     History of arthritis    Personal history of other infectious and parasitic diseases 04/28/2016    History of herpes zoster    Rheumatoid arthritis, unspecified (Multi) 05/16/2022    Rheumatoid arthritis    Sjogren syndrome, unspecified (Multi)     History of Sjogren's disease    Unspecified perforation of tympanic membrane, right ear 07/29/2016    Perforated tympanic membrane, right    Urinary tract infection, site not specified 10/21/2022    Acute lower UTI    Urinary tract infection, site not specified 08/20/2021    Acute lower UTI       Surgical History  Past Surgical History:   Procedure Laterality Date    APPENDECTOMY  09/09/2016    Appendectomy    CT ABDOMEN PELVIS ANGIOGRAM W AND/OR WO IV CONTRAST  4/11/2019    CT ABDOMEN PELVIS ANGIOGRAM W AND/OR WO IV CONTRAST 4/11/2019 [facility] CLINICAL LEGACY    OTHER SURGICAL HISTORY  09/09/2016    Mastoidectomy, Revision - With Tympanoplasty    OTHER SURGICAL HISTORY  01/07/2021    Foot surgery        Social History  She reports that she has never smoked. She has never used smokeless tobacco. She reports that she does not drink alcohol and does not use drugs.    Family History  Family History   Problem Relation Name Age of Onset    Other (Cardiac abnormality) Mother      Hypothyroidism  Mother      Intracerebral hemorrhage Mother          Allergies  Egg, Furosemide, Peanut, Adalimumab, Amoxicillin-pot clavulanate, Capsaicin, Cephalosporins, Flaxseed, Gabapentin, Ibuprofen, Loperamide, Methotrexate, Milnacipran, Neomycin-polymyxin-hc, Propofol, Sulfa (sulfonamide antibiotics), and Capsaicin-menthol    Review of Systems   13 point ROS done and negative except for the above.   Physical Exam     General: NAD, well nourished   Eyes: Non-icteric sclera, EOMI  Ears, Nose, Mouth, and Throat: External ears and nose appear to be without deformity or rash. No lesions or masses noted. Hearing is grossly intact.   Neck: Trachea midline  Respiratory: Nonlabored breathing   Cardiovascular: No JVD  Skin: No rashes or open lesions/ulcers identified on skin.    Last Recorded Vitals  There were no vitals taken for this visit.    Relevant Results           Assessment/Plan   Problem List Items Addressed This Visit    None      Patient is a 63-year-old female with a past medical steroid seen for L3-4-5 laminectomy, presenting for spinal cord stimulator plantation.  (Trial axis left side bias T12/L1). Leads bottom of T8.     Risks, benefits, alternatives to the procedure were discussed in detail and patient was amenable to proceeding.    Aldo Deluca MD

## 2024-05-17 ENCOUNTER — APPOINTMENT (OUTPATIENT)
Dept: RADIOLOGY | Facility: HOSPITAL | Age: 64
End: 2024-05-17
Payer: MEDICAID

## 2024-05-17 ENCOUNTER — ANESTHESIA (OUTPATIENT)
Dept: OPERATING ROOM | Facility: HOSPITAL | Age: 64
End: 2024-05-17
Payer: MEDICAID

## 2024-05-17 ENCOUNTER — HOSPITAL ENCOUNTER (OUTPATIENT)
Facility: HOSPITAL | Age: 64
Setting detail: OUTPATIENT SURGERY
Discharge: HOME | End: 2024-05-17
Attending: ANESTHESIOLOGY | Admitting: ANESTHESIOLOGY
Payer: MEDICAID

## 2024-05-17 ENCOUNTER — PHARMACY VISIT (OUTPATIENT)
Dept: PHARMACY | Facility: CLINIC | Age: 64
End: 2024-05-17
Payer: MEDICAID

## 2024-05-17 ENCOUNTER — ANESTHESIA EVENT (OUTPATIENT)
Dept: OPERATING ROOM | Facility: HOSPITAL | Age: 64
End: 2024-05-17
Payer: MEDICAID

## 2024-05-17 VITALS
TEMPERATURE: 97.9 F | BODY MASS INDEX: 27.29 KG/M2 | DIASTOLIC BLOOD PRESSURE: 79 MMHG | SYSTOLIC BLOOD PRESSURE: 114 MMHG | WEIGHT: 154 LBS | HEART RATE: 62 BPM | OXYGEN SATURATION: 98 % | RESPIRATION RATE: 15 BRPM | HEIGHT: 63 IN

## 2024-05-17 DIAGNOSIS — Z79.2 PROPHYLACTIC ANTIBIOTIC: ICD-10-CM

## 2024-05-17 DIAGNOSIS — G89.18 POSTOPERATIVE PAIN: ICD-10-CM

## 2024-05-17 DIAGNOSIS — M48.062 LUMBAR STENOSIS WITH NEUROGENIC CLAUDICATION: Primary | ICD-10-CM

## 2024-05-17 PROCEDURE — C1822 GEN, NEURO, HF, RECHG BAT: HCPCS | Performed by: ANESTHESIOLOGY

## 2024-05-17 PROCEDURE — 2720000007 HC OR 272 NO HCPCS: Performed by: ANESTHESIOLOGY

## 2024-05-17 PROCEDURE — 76000 FLUOROSCOPY <1 HR PHYS/QHP: CPT

## 2024-05-17 PROCEDURE — 2500000004 HC RX 250 GENERAL PHARMACY W/ HCPCS (ALT 636 FOR OP/ED): Performed by: ANESTHESIOLOGIST ASSISTANT

## 2024-05-17 PROCEDURE — C1820 GENERATOR NEURO RECHG BAT SY: HCPCS | Performed by: ANESTHESIOLOGY

## 2024-05-17 PROCEDURE — 95972 ALYS CPLX SP/PN NPGT W/PRGRM: CPT | Performed by: ANESTHESIOLOGY

## 2024-05-17 PROCEDURE — 2500000001 HC RX 250 WO HCPCS SELF ADMINISTERED DRUGS (ALT 637 FOR MEDICARE OP): Performed by: ANESTHESIOLOGY

## 2024-05-17 PROCEDURE — A4217 STERILE WATER/SALINE, 500 ML: HCPCS | Performed by: ANESTHESIOLOGY

## 2024-05-17 PROCEDURE — 63650 IMPLANT NEUROELECTRODES: CPT | Performed by: ANESTHESIOLOGY

## 2024-05-17 PROCEDURE — RXMED WILLOW AMBULATORY MEDICATION CHARGE

## 2024-05-17 PROCEDURE — 3700000002 HC GENERAL ANESTHESIA TIME - EACH INCREMENTAL 1 MINUTE: Performed by: ANESTHESIOLOGY

## 2024-05-17 PROCEDURE — 3700000001 HC GENERAL ANESTHESIA TIME - INITIAL BASE CHARGE: Performed by: ANESTHESIOLOGY

## 2024-05-17 PROCEDURE — C1778 LEAD, NEUROSTIMULATOR: HCPCS | Performed by: ANESTHESIOLOGY

## 2024-05-17 PROCEDURE — 7100000001 HC RECOVERY ROOM TIME - INITIAL BASE CHARGE: Performed by: ANESTHESIOLOGY

## 2024-05-17 PROCEDURE — 2780000003 HC OR 278 NO HCPCS: Performed by: ANESTHESIOLOGY

## 2024-05-17 PROCEDURE — 63685 INS/RPLC SPI NPG/RCVR POCKET: CPT | Performed by: ANESTHESIOLOGY

## 2024-05-17 PROCEDURE — C1787 PATIENT PROGR, NEUROSTIM: HCPCS | Performed by: ANESTHESIOLOGY

## 2024-05-17 PROCEDURE — 7100000010 HC PHASE TWO TIME - EACH INCREMENTAL 1 MINUTE: Performed by: ANESTHESIOLOGY

## 2024-05-17 PROCEDURE — A63685 PR IMPLANT SPINAL NEUROSTIM/RECEIVER: Performed by: ANESTHESIOLOGIST ASSISTANT

## 2024-05-17 PROCEDURE — 3600000009 HC OR TIME - EACH INCREMENTAL 1 MINUTE - PROCEDURE LEVEL FOUR: Performed by: ANESTHESIOLOGY

## 2024-05-17 PROCEDURE — 7100000002 HC RECOVERY ROOM TIME - EACH INCREMENTAL 1 MINUTE: Performed by: ANESTHESIOLOGY

## 2024-05-17 PROCEDURE — A63685 PR IMPLANT SPINAL NEUROSTIM/RECEIVER: Performed by: ANESTHESIOLOGY

## 2024-05-17 PROCEDURE — C1889 IMPLANT/INSERT DEVICE, NOC: HCPCS | Performed by: ANESTHESIOLOGY

## 2024-05-17 PROCEDURE — 2500000005 HC RX 250 GENERAL PHARMACY W/O HCPCS: Performed by: ANESTHESIOLOGY

## 2024-05-17 PROCEDURE — 3600000004 HC OR TIME - INITIAL BASE CHARGE - PROCEDURE LEVEL FOUR: Performed by: ANESTHESIOLOGY

## 2024-05-17 PROCEDURE — 2500000004 HC RX 250 GENERAL PHARMACY W/ HCPCS (ALT 636 FOR OP/ED): Performed by: ANESTHESIOLOGY

## 2024-05-17 PROCEDURE — 7100000009 HC PHASE TWO TIME - INITIAL BASE CHARGE: Performed by: ANESTHESIOLOGY

## 2024-05-17 DEVICE — IMPLANTABLE PULSE GENERATOR KIT
Type: IMPLANTABLE DEVICE | Site: BACK | Status: FUNCTIONAL
Brand: WAVEWRITER ALPHA™ 16

## 2024-05-17 DEVICE — IMPLANTABLE DEVICE: Type: IMPLANTABLE DEVICE | Site: BACK | Status: FUNCTIONAL

## 2024-05-17 DEVICE — BAND, TISSUE FIXATE: Type: IMPLANTABLE DEVICE | Site: BACK | Status: FUNCTIONAL

## 2024-05-17 RX ORDER — SODIUM CHLORIDE, SODIUM LACTATE, POTASSIUM CHLORIDE, CALCIUM CHLORIDE 600; 310; 30; 20 MG/100ML; MG/100ML; MG/100ML; MG/100ML
INJECTION, SOLUTION INTRAVENOUS CONTINUOUS PRN
Status: DISCONTINUED | OUTPATIENT
Start: 2024-05-17 | End: 2024-05-18

## 2024-05-17 RX ORDER — OXYCODONE HYDROCHLORIDE 5 MG/1
5 TABLET ORAL EVERY 4 HOURS PRN
Status: DISCONTINUED | OUTPATIENT
Start: 2024-05-17 | End: 2024-05-17 | Stop reason: HOSPADM

## 2024-05-17 RX ORDER — LIDOCAINE HYDROCHLORIDE 10 MG/ML
0.1 INJECTION, SOLUTION EPIDURAL; INFILTRATION; INTRACAUDAL; PERINEURAL ONCE
Status: DISCONTINUED | OUTPATIENT
Start: 2024-05-17 | End: 2024-05-17 | Stop reason: HOSPADM

## 2024-05-17 RX ORDER — ALBUTEROL SULFATE 0.83 MG/ML
2.5 SOLUTION RESPIRATORY (INHALATION) ONCE AS NEEDED
Status: DISCONTINUED | OUTPATIENT
Start: 2024-05-17 | End: 2024-05-17 | Stop reason: HOSPADM

## 2024-05-17 RX ORDER — HYDRALAZINE HYDROCHLORIDE 20 MG/ML
5 INJECTION INTRAMUSCULAR; INTRAVENOUS EVERY 30 MIN PRN
Status: DISCONTINUED | OUTPATIENT
Start: 2024-05-17 | End: 2024-05-17 | Stop reason: HOSPADM

## 2024-05-17 RX ORDER — FENTANYL CITRATE 50 UG/ML
INJECTION, SOLUTION INTRAMUSCULAR; INTRAVENOUS AS NEEDED
Status: DISCONTINUED | OUTPATIENT
Start: 2024-05-17 | End: 2024-05-18

## 2024-05-17 RX ORDER — SODIUM CHLORIDE, SODIUM LACTATE, POTASSIUM CHLORIDE, CALCIUM CHLORIDE 600; 310; 30; 20 MG/100ML; MG/100ML; MG/100ML; MG/100ML
100 INJECTION, SOLUTION INTRAVENOUS CONTINUOUS
Status: DISCONTINUED | OUTPATIENT
Start: 2024-05-17 | End: 2024-05-17 | Stop reason: HOSPADM

## 2024-05-17 RX ORDER — CLINDAMYCIN HYDROCHLORIDE 300 MG/1
300 CAPSULE ORAL 3 TIMES DAILY
Qty: 6 CAPSULE | Refills: 0 | Status: SHIPPED | OUTPATIENT
Start: 2024-05-17 | End: 2024-05-19

## 2024-05-17 RX ORDER — OXYCODONE HYDROCHLORIDE 5 MG/1
5 TABLET ORAL 3 TIMES DAILY PRN
Qty: 15 TABLET | Refills: 0 | Status: SHIPPED | OUTPATIENT
Start: 2024-05-17 | End: 2024-05-22

## 2024-05-17 RX ORDER — PROPOFOL 10 MG/ML
INJECTION, EMULSION INTRAVENOUS CONTINUOUS PRN
Status: DISCONTINUED | OUTPATIENT
Start: 2024-05-17 | End: 2024-05-18

## 2024-05-17 RX ORDER — MIDAZOLAM HYDROCHLORIDE 1 MG/ML
INJECTION INTRAMUSCULAR; INTRAVENOUS AS NEEDED
Status: DISCONTINUED | OUTPATIENT
Start: 2024-05-17 | End: 2024-05-18

## 2024-05-17 RX ORDER — PROMETHAZINE HYDROCHLORIDE 25 MG/ML
6.25 INJECTION, SOLUTION INTRAMUSCULAR; INTRAVENOUS ONCE AS NEEDED
Status: DISCONTINUED | OUTPATIENT
Start: 2024-05-17 | End: 2024-05-17 | Stop reason: HOSPADM

## 2024-05-17 RX ORDER — PROPOFOL 10 MG/ML
INJECTION, EMULSION INTRAVENOUS AS NEEDED
Status: DISCONTINUED | OUTPATIENT
Start: 2024-05-17 | End: 2024-05-17

## 2024-05-17 RX ORDER — CLINDAMYCIN PHOSPHATE 150 MG/ML
INJECTION, SOLUTION INTRAVENOUS AS NEEDED
Status: DISCONTINUED | OUTPATIENT
Start: 2024-05-17 | End: 2024-05-18

## 2024-05-17 RX ORDER — LABETALOL HYDROCHLORIDE 5 MG/ML
5 INJECTION, SOLUTION INTRAVENOUS ONCE AS NEEDED
Status: DISCONTINUED | OUTPATIENT
Start: 2024-05-17 | End: 2024-05-17 | Stop reason: HOSPADM

## 2024-05-17 RX ORDER — DOXYCYCLINE 100 MG/1
100 CAPSULE ORAL 2 TIMES DAILY
Qty: 4 CAPSULE | Refills: 0 | Status: SHIPPED | OUTPATIENT
Start: 2024-05-17 | End: 2024-05-19

## 2024-05-17 RX ORDER — SODIUM CHLORIDE 0.9 G/100ML
IRRIGANT IRRIGATION AS NEEDED
Status: DISCONTINUED | OUTPATIENT
Start: 2024-05-17 | End: 2024-05-17 | Stop reason: HOSPADM

## 2024-05-17 RX ORDER — ONDANSETRON HYDROCHLORIDE 2 MG/ML
4 INJECTION, SOLUTION INTRAVENOUS ONCE AS NEEDED
Status: DISCONTINUED | OUTPATIENT
Start: 2024-05-17 | End: 2024-05-17 | Stop reason: HOSPADM

## 2024-05-17 RX ADMIN — HYDROMORPHONE HYDROCHLORIDE 0.5 MG: 1 INJECTION, SOLUTION INTRAMUSCULAR; INTRAVENOUS; SUBCUTANEOUS at 10:50

## 2024-05-17 RX ADMIN — PROPOFOL 150 MCG/KG/MIN: 10 INJECTION, EMULSION INTRAVENOUS at 09:20

## 2024-05-17 RX ADMIN — HYDROMORPHONE HYDROCHLORIDE 0.5 MG: 1 INJECTION, SOLUTION INTRAMUSCULAR; INTRAVENOUS; SUBCUTANEOUS at 10:33

## 2024-05-17 RX ADMIN — CLINDAMYCIN PHOSPHATE 900 MG: 150 INJECTION, SOLUTION INTRAMUSCULAR; INTRAVENOUS at 09:19

## 2024-05-17 RX ADMIN — FENTANYL CITRATE 25 MCG: 50 INJECTION, SOLUTION INTRAMUSCULAR; INTRAVENOUS at 09:20

## 2024-05-17 RX ADMIN — OXYCODONE HYDROCHLORIDE 5 MG: 5 TABLET ORAL at 11:18

## 2024-05-17 RX ADMIN — SODIUM CHLORIDE, POTASSIUM CHLORIDE, SODIUM LACTATE AND CALCIUM CHLORIDE: 600; 310; 30; 20 INJECTION, SOLUTION INTRAVENOUS at 09:11

## 2024-05-17 RX ADMIN — MIDAZOLAM HYDROCHLORIDE 2 MG: 1 INJECTION, SOLUTION INTRAMUSCULAR; INTRAVENOUS at 09:10

## 2024-05-17 ASSESSMENT — COLUMBIA-SUICIDE SEVERITY RATING SCALE - C-SSRS
2. HAVE YOU ACTUALLY HAD ANY THOUGHTS OF KILLING YOURSELF?: NO
1. IN THE PAST MONTH, HAVE YOU WISHED YOU WERE DEAD OR WISHED YOU COULD GO TO SLEEP AND NOT WAKE UP?: NO
6. HAVE YOU EVER DONE ANYTHING, STARTED TO DO ANYTHING, OR PREPARED TO DO ANYTHING TO END YOUR LIFE?: NO

## 2024-05-17 ASSESSMENT — PAIN - FUNCTIONAL ASSESSMENT
PAIN_FUNCTIONAL_ASSESSMENT: 0-10

## 2024-05-17 ASSESSMENT — PAIN DESCRIPTION - LOCATION
LOCATION: BACK

## 2024-05-17 ASSESSMENT — PAIN SCALES - GENERAL
PAINLEVEL_OUTOF10: 3
PAINLEVEL_OUTOF10: 5 - MODERATE PAIN
PAINLEVEL_OUTOF10: 7
PAINLEVEL_OUTOF10: 3
PAINLEVEL_OUTOF10: 0 - NO PAIN
PAINLEVEL_OUTOF10: 7
PAINLEVEL_OUTOF10: 5 - MODERATE PAIN
PAINLEVEL_OUTOF10: 6
PAINLEVEL_OUTOF10: 5 - MODERATE PAIN
PAINLEVEL_OUTOF10: 7

## 2024-05-17 ASSESSMENT — PAIN DESCRIPTION - ORIENTATION
ORIENTATION: LEFT
ORIENTATION: LEFT

## 2024-05-17 NOTE — POST-PROCEDURE NOTE
1135 Nurse help pt to get dressed    1142 Discharge instruction reviewed with pt and family by nurse    1149 IV removed

## 2024-05-17 NOTE — ANESTHESIA PREPROCEDURE EVALUATION
"Patient: Cynthia Cantrell    Procedure Information       Date/Time: 05/17/24 0900    Procedure: Spinal Cord Stimulator Insertion (Back) - Auth approved for [facility] IMPLANT AUTH#[id number]. Valid 4/29/2024-7/25/2024    Location: [facility] OR 11 / [facility] OR    Surgeons: Miriam Winston MD          62 yo F hx spinal stenosis and back surgery  now s/f SCS.  Hx fibromyalgia, GERD (controlled with meds), OA, RA (wheelchair bound), Sjogren.  Hx egg allergy and someone years ago told her to avoid propofol which has been avoided on anesthesitics in the last few years.  However, pt has never had any specific issue with propofol ever.  Discussed with patient that the American Academy of Allergy, Asthma, and Immunology now now state that patients with egg allergy \"can receive propofol without any special precautions\".  I gave the patient the option of using propofol and or entirely avoiding it like the past.  The patient decided to have propofol.     Separately the pt stated an issue with appendectomy in early 1970s where she had allergic reaction to (\"demetrio- something\")    Relevant Problems   Anesthesia  Hx egg allergy and someone years ago told her to avoid propofol which has been avoided on anesthesitics in the last few years.  However, pt has never had any specific issue with propofol ever.  Discussed with patient that the American Academy of Allergy, Asthma, and Immunology now now state that patients with egg allergy \"can receive propofol without any special precautions\".  I gave the patient the option of using propofol and or entirely avoiding it like the past.  The patient decided to have propofol.     Separately the pt stated an issue with appendectomy in early 1970s where she had allergic reaction to (\"demetrio- something\")      Cardiac   (+) Chest pain   (+) Coronary arteriosclerosis in native artery   (+) Hyperlipidemia   (+) Hypertension   (+) Mixed hyperlipidemia   (+) Myocardial infarct (Multi)   (+) NSTEMI " "(non-ST elevated myocardial infarction) (Multi)      Neuro   (+) Cervical radiculitis   (+) Lumbar radiculopathy   (+) Lumbosacral neuritis      GI   (+) GERD (gastroesophageal reflux disease)      Liver   (+) Familial porphyria cutanea tarda (Multi)      Musculoskeletal   (+) Chronic pain syndrome   (+) Fibromyalgia   (+) Lumbar spondylosis   (+) Lumbar stenosis with neurogenic claudication   (+) Lumbosacral stenosis   (+) Rheumatoid arthritis (Multi)      HEENT   (+) Conductive hearing loss of right ear with unrestricted hearing of left ear   (+) Seasonal allergies      ID   (+) Plantar wart of right foot      Skin   (+) Rash and other nonspecific skin eruption       Clinical information reviewed:   Tobacco  Allergies  Meds   Med Hx  Surg Hx   Fam Hx  Soc Hx        NPO Detail:  NPO/Void Status  Carbohydrate Drink Given Prior to Surgery? : N  Date of Last Liquid: 05/17/24  Time of Last Liquid: 0500  Date of Last Solid: 05/16/24  Time of Last Solid: 2200  Last Intake Type: Clear fluids  Time of Last Void: 0800         Physical Exam    Airway  Mallampati: III  TM distance: >3 FB  Neck ROM: full     Cardiovascular   Rate: normal     Dental     Comments: Frontal crowns   Pulmonary - normal exam     Abdominal            Anesthesia Plan    History of general anesthesia?: yes  History of complications of general anesthesia?: no    ASA 3     MAC   (Hx egg allergy and someone years ago told her to avoid propofol which has been avoided on anesthesitics in the last few years.  However, pt has never had any specific issue with propofol ever.  Discussed with patient that the American Academy of Allergy, Asthma, and Immunology now now state that patients with egg allergy \"can receive propofol without any special precautions\".  I gave the patient the option of using propofol and or entirely avoiding it like the past.  The patient decided to have propofol.   )  intravenous induction   Postoperative administration of opioids " is intended.  Anesthetic plan and risks discussed with patient.

## 2024-05-17 NOTE — OP NOTE
Spinal Cord Stimulator Insertion Operative Note     Date: 2024  OR Location: [facility] A OR    Name: Cynthia Cantrell, : 1960, Age: 63 y.o., MRN: [id number], Sex: female    Diagnosis  Pre-op Diagnosis     * Lumbar stenosis with neurogenic claudication [M48.062] Post-op Diagnosis     * Lumbar stenosis with neurogenic claudication [M48.062]     Procedures  Spinal Cord Stimulator Insertion  64332 - OR INSJ/RPLCMT SPINAL NPG/RCVR POCKET CRTJ&CONNJ    Spinal Cord Stimulator Insertion  79020 - OR PRQ IMPLTJ NSTIM ELECTRODE ARRAY EPIDURAL      Surgeons      * Miriam Winston - Primary    Resident/Fellow/Other Assistant:  Surgeons and Role:  * No surgeons found with a matching role *    Procedure Summary  Anesthesia: Monitor Anesthesia Care  ASA: III  Anesthesia Staff: Anesthesiologist: Darian Berger MD  C-AA: SAMANTHA Brown  Estimated Blood Loss: 5 mL  Intra-op Medications:   Administrations occurring from 0900 to 1100 on 24:   Medication Name Total Dose   sodium chloride 0.9 % irrigation solution 1,000 mL   BUPivacaine-EPINEPHrine (Marcaine w/EPI) 20 mL, lidocaine (Xylocaine) 20 mg/mL (2 %) 20 mL syringe 14 mL   HYDROmorphone (Dilaudid) injection 0.5 mg 0.5 mg   lactated Ringer's infusion 66.67 mL              Anesthesia Record               Intraprocedure I/O Totals          Intake    Propofol Drip 0.00 mL    The total shown is the total volume documented since Anesthesia Start was filed.    LR infusion 300.00 mL    Total Intake 300 mL       Output    Est. Blood Loss 5 mL    Total Output 5 mL       Net    Net Volume 295 mL          Specimen: No specimens collected     Staff:   Circulator: Mary Ash RN  Relief Circulator: Raissa Navarrete RN  Relief Scrub: Bailey Jensen  Scrub Person: Tamara Estrada         Drains and/or Catheters: * None in log *    Tourniquet Times:         Implants:  Implants       Type Name Action Serial No.      Neuro Interventional Implant GENERATOR KIT,  YAEL ALPHA,  [facility] - [id number] - [id number] Implanted [id number]     Implant ANCHOR KIT, CLIK X, MRI - [id number] Implanted      Spinal Hardware LEAD KIT, AVISTA MRI PERC, CONTACT 8, 56CM - [id number] Implanted      Spinal Hardware LEAD KIT, AVISTA MRI PERC, CONTACT 8, 56CM - [id number] Implanted               Findings:     Indications: Cynthia Cantrell is an 63 y.o. female who is having surgery for Lumbar stenosis with neurogenic claudication [M48.062].     The patient was seen in the preoperative area. The risks, benefits, complications, treatment options, non-operative alternatives, expected recovery and outcomes were discussed with the patient. The possibilities of reaction to medication, pulmonary aspiration, injury to surrounding structures, bleeding, recurrent infection, the need for additional procedures, failure to diagnose a condition, and creating a complication requiring transfusion or operation were discussed with the patient. The patient concurred with the proposed plan, giving informed consent.  The site of surgery was properly noted/marked if necessary per policy. The patient has been actively warmed in preoperative area. Preoperative antibiotics are not indicated. Venous thrombosis prophylaxis are not indicated.    Procedure Details:     Preoperative diagnosis/postoperative diagnosis: Failed back surgery syndrome  Procedure: [employer] spinal cord stimulator permanent implant under fluoroscopic guidance.  Avista MRI compatible leads x 2, 56 cm 8 contact leads.  Wave Writer Alpha pulse generator complex intraoperative and postoperative programming.  Surgeon: Miriam Winston  Assistant:  Fellow, Ced Deluca  Anesthesia: MAC   Complications: Apparently none      Procedure note: The patient was met in the preoperative holding area after risks benefits and alternatives to procedure were discussed with the patient, informed consent was obtained. Patient brought back to the procedure room  and placed in the prone position on the fluoroscopy table. Area over the back was exposed, prepped, draped, in the usual sterile fashion.  Skin and subcutaneous tissues to the lumbar intralaminar space was anesthetized using local anesthetic mixture.  A 14-gauge modified Tuohy needle was inserted and skin and advanced in a paramedian approach. The entry point was at T12-L1 and approximately 3.5 cm incision was made just left of midline.  Dissection down to the fascia.  The first needle was placed on the left and a glass syringe was used to achieved epidural space using loss resistance technique. The lead was placed and advanced superiorly. Second needle was placed behind the first at the same interspace. A glass syringe was used to achieve the epidural space using loss resistance technique in the lateral view. The second lead was advanced and threaded up. They were placed to the upper portion of T8. Both leads were visualized in the posterior epidural space. Intraoperative programming was completed and was covering all of the patient's areas of pain. At that point the stylets and needles were removed. The leads were secured to the fascia using clik loc anchors.  A pocket was made just left of midline.  2, 0 Nurolon sutures were placed deep into the pocket and the pocket was copiously irrigated.  Sutures were then placed through the eyelets on the generator which had been connected and tightened down to the leads.  Device was checked and all connections confirmed.  The device was then placed in the pocket along with the leads and the wound was closed in 2 layers.  Interrupted 0 Nurolon suture was used for the deep layer.  Running V-Loc 2-0 suture was then used for the more superficial layer.  Dermabond placed at the skin.      Further postprocedural programming was done in the PACU.    Complications:  None; patient tolerated the procedure well.    Disposition: PACU - hemodynamically stable.  Condition: stable          Additional Details:     Attending Attestation: I was present and scrubbed for the entire procedure.    Miriam Winston  Phone Number: [phone]

## 2024-05-17 NOTE — DISCHARGE INSTRUCTIONS
Post-surgery instructions:         Blood thinners: You may resume them tomorrow evening.         Pain Medications: These have been sent to your pharmacy. You will be getting 5-10 pills for post-surgery pain. You will not be getting a refill, so don't lose them.         Antibiotics: You are prescribed 2 different antibiotics for 24 hours only.  Take them as prescribed.  You should also continue to use the mupirocin ointment in your nares as well as the skin folds for the next 3 days.         Activity: Avoid strenuous activity until the follow up appointment. No lifting over 10 lbs for 24hrs. and no lifting over 30 pounds for 6 weeks.  After that return to your normal activity level.         Bandages:  Leave the bandages in place until the follow up appointment or at least for 72 hours. Keep them dry.         Showering/Bathing:         Do not shower until the follow up appointment for a minimum of 72 hours after surgery, sponge bathe only.       Do not completely submerge incision (no bath tub, hot tub, pool, lake, etc)  until skin has fully healed over (approximately one month).       Scheduling for pain management: [phone]       After hours, call the   ([phone]) and ask for the pain management answering service if you notice any of the below:         Call the doctor immediately: if you notice:         Excessive bleeding from procedure site (brisk bright red bleeding from the site or bleeding that soaks the bandages or does not stop)  Severe headache  Inability to walk, leg or arm weakness or numbness that is significantly worse after the procedure  Uncontrolled pain  Inability to control your bowels or bladder  Signs of infection: Fever above 101.5F, redness, swelling, pus or drainage from the site

## 2024-05-18 NOTE — ANESTHESIA POSTPROCEDURE EVALUATION
Patient: Cynthia Cantrell    Procedure Summary       Date: 05/17/24 Room / Location: [facility] OR 11 / Virtual [facility] OR    Anesthesia Start: 0912 Anesthesia Stop: 1027    Procedure: Spinal Cord Stimulator Insertion (Back) Diagnosis:       Lumbar stenosis with neurogenic claudication      (Lumbar stenosis with neurogenic claudication [M48.062])    Surgeons: Miriam Winston MD Responsible Provider: Darian Berger MD    Anesthesia Type: MAC ASA Status: 3            Anesthesia Type: MAC    Vitals Value Taken Time   /79 05/17/24 1134   Temp 36.6 °C (97.9 °F) 05/17/24 1134   Pulse 62 05/17/24 1134   Resp 15 05/17/24 1134   SpO2 98 % 05/17/24 1134       Anesthesia Post Evaluation    Patient participation: complete - patient participated  Level of consciousness: awake  Pain management: adequate  Airway patency: patent  Cardiovascular status: acceptable and hemodynamically stable  Respiratory status: acceptable  Hydration status: acceptable  Postoperative Nausea and Vomiting: none        No notable events documented.

## 2024-05-22 ENCOUNTER — HOSPITAL ENCOUNTER (EMERGENCY)
Facility: HOSPITAL | Age: 64
Discharge: HOME | End: 2024-05-22
Attending: STUDENT IN AN ORGANIZED HEALTH CARE EDUCATION/TRAINING PROGRAM
Payer: MEDICAID

## 2024-05-22 ENCOUNTER — APPOINTMENT (OUTPATIENT)
Dept: RADIOLOGY | Facility: HOSPITAL | Age: 64
End: 2024-05-22
Payer: MEDICAID

## 2024-05-22 VITALS
SYSTOLIC BLOOD PRESSURE: 154 MMHG | OXYGEN SATURATION: 98 % | HEIGHT: 63 IN | WEIGHT: 145 LBS | TEMPERATURE: 97.9 F | HEART RATE: 90 BPM | BODY MASS INDEX: 25.69 KG/M2 | DIASTOLIC BLOOD PRESSURE: 96 MMHG | RESPIRATION RATE: 18 BRPM
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
## 2024-05-22 DIAGNOSIS — S51.812A LACERATION OF LEFT FOREARM, INITIAL ENCOUNTER: ICD-10-CM

## 2024-05-22 PROCEDURE — 90471 IMMUNIZATION ADMIN: CPT

## 2024-05-22 PROCEDURE — 99283 EMERGENCY DEPT VISIT LOW MDM: CPT | Mod: 25

## 2024-05-22 PROCEDURE — 90715 TDAP VACCINE 7 YRS/> IM: CPT | Mod: SE

## 2024-05-22 PROCEDURE — 99284 EMERGENCY DEPT VISIT MOD MDM: CPT | Performed by: STUDENT IN AN ORGANIZED HEALTH CARE EDUCATION/TRAINING PROGRAM

## 2024-05-22 PROCEDURE — 73090 X-RAY EXAM OF FOREARM: CPT | Mod: LT

## 2024-05-22 PROCEDURE — 2500000001 HC RX 250 WO HCPCS SELF ADMINISTERED DRUGS (ALT 637 FOR MEDICARE OP): Mod: SE

## 2024-05-22 PROCEDURE — 73090 X-RAY EXAM OF FOREARM: CPT | Mod: LEFT SIDE | Performed by: RADIOLOGY

## 2024-05-22 PROCEDURE — 2500000004 HC RX 250 GENERAL PHARMACY W/ HCPCS (ALT 636 FOR OP/ED): Mod: SE

## 2024-05-22 RX ORDER — DOXYCYCLINE HYCLATE 100 MG
100 TABLET ORAL ONCE
Status: COMPLETED | OUTPATIENT
Start: 2024-05-22 | End: 2024-05-22

## 2024-05-22 RX ORDER — DOXYCYCLINE 100 MG/1
100 CAPSULE ORAL 2 TIMES DAILY
Qty: 10 CAPSULE | Refills: 0 | Status: SHIPPED | OUTPATIENT
Start: 2024-05-22 | End: 2024-05-27

## 2024-05-22 RX ORDER — METRONIDAZOLE 500 MG/1
500 TABLET ORAL ONCE
Status: COMPLETED | OUTPATIENT
Start: 2024-05-22 | End: 2024-05-22

## 2024-05-22 RX ORDER — METRONIDAZOLE 500 MG/1
500 TABLET ORAL 3 TIMES DAILY
Qty: 15 TABLET | Refills: 0 | Status: SHIPPED | OUTPATIENT
Start: 2024-05-22 | End: 2024-05-27

## 2024-05-22 RX ORDER — ACETAMINOPHEN 325 MG/1
975 TABLET ORAL ONCE
Status: COMPLETED | OUTPATIENT
Start: 2024-05-22 | End: 2024-05-22

## 2024-05-22 RX ADMIN — METRONIDAZOLE 500 MG: 500 TABLET ORAL at 21:47

## 2024-05-22 RX ADMIN — DOXYCYCLINE HYCLATE 100 MG: 100 TABLET, COATED ORAL at 21:47

## 2024-05-22 RX ADMIN — ACETAMINOPHEN 975 MG: 325 TABLET ORAL at 21:06

## 2024-05-22 RX ADMIN — TETANUS TOXOID, REDUCED DIPHTHERIA TOXOID AND ACELLULAR PERTUSSIS VACCINE, ADSORBED 0.5 ML: 5; 2.5; 8; 8; 2.5 SUSPENSION INTRAMUSCULAR at 21:06

## 2024-05-22 ASSESSMENT — PAIN - FUNCTIONAL ASSESSMENT: PAIN_FUNCTIONAL_ASSESSMENT: 0-10

## 2024-05-22 ASSESSMENT — COLUMBIA-SUICIDE SEVERITY RATING SCALE - C-SSRS
1. IN THE PAST MONTH, HAVE YOU WISHED YOU WERE DEAD OR WISHED YOU COULD GO TO SLEEP AND NOT WAKE UP?: NO
2. HAVE YOU ACTUALLY HAD ANY THOUGHTS OF KILLING YOURSELF?: NO
6. HAVE YOU EVER DONE ANYTHING, STARTED TO DO ANYTHING, OR PREPARED TO DO ANYTHING TO END YOUR LIFE?: NO

## 2024-05-22 ASSESSMENT — PAIN SCALES - GENERAL: PAINLEVEL_OUTOF10: 8

## 2024-05-23 ENCOUNTER — OFFICE VISIT (OUTPATIENT)
Dept: PAIN MEDICINE | Facility: HOSPITAL | Age: 64
End: 2024-05-23
Payer: MEDICAID

## 2024-05-23 DIAGNOSIS — M54.17 LUMBOSACRAL NEURITIS: Primary | ICD-10-CM

## 2024-05-23 PROCEDURE — 99024 POSTOP FOLLOW-UP VISIT: CPT | Performed by: ANESTHESIOLOGY

## 2024-05-23 NOTE — DISCHARGE INSTRUCTIONS
Your wound was thoroughly washed out and if you notice any pus, fevers or chills, severe tenderness or difficulty with movement of your hand, wrist or forearm, significant swelling please return for evaluation.  You have been given doxycycline and metronidazole for antibiotic prophylaxis for dog bite infections.  Please follow-up with your spinal cord stimulator physician.

## 2024-05-23 NOTE — ED TRIAGE NOTES
Pt to ED c/o dog bite. Pt has older dog that was startled by her and bit her arm, pt has one puncture wound and an approx 3cm lac to left forearm, bleeding controlled.

## 2024-05-23 NOTE — ED PROVIDER NOTES
CC: Animal Bite     HPI:   Patient is a 63-year-old female with past medical history of severe spinal stenosis status post spinal cord stimulator (5/17/2024), fibromyalgia, Sjogren's disease presenting due to dog bite that was sustained prior to arrival.  Patient reports that she has spinal cord stimulator put in a few to days ago and is unable to walk her dog and her  returned with her dog.  Patient reports that she went to pet the dog but had fallen asleep and was scared and accidentally bit the patient's left forearm.  She has a hemostatic linear 3 cm laceration over her forearm along with an isolated puncture wound over her mid forearm and an abrasion over her thenar eminence.  Patient does not have any history of DVTs or PEs and is not on any anticoagulation.  She has not had any recent fevers or chills and reports that the dog is up-to-date on vaccinations.  Patient's last tetanus shot was in 2016.  Patient has an intact neurovascular exam with good capillary refill and intact sensation in the median, ulnar and radial distributions of the hand and forearm.    Limitations to History: none  Additional History Obtained from: patient alone    PMHx/PSHx:  Per HPI.   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  - has a past  surgical history that includes Appendectomy (09/09/2016); Other surgical history (09/09/2016); Other surgical history (01/07/2021); and CT angio abdomen pelvis w and or wo IV IV contrast (4/11/2019).    Social History:  - Tobacco:  reports that she has never smoked. She has never used smokeless tobacco.   - Alcohol:  reports no history of alcohol use.   - Drugs:  reports no history of drug use.     Medications: Reviewed in EMR.     Allergies:  Egg, Furosemide, Peanut, Adalimumab, Amoxicillin-pot clavulanate, Capsaicin, Cephalosporins, Flaxseed, Gabapentin, Ibuprofen, Loperamide, Methotrexate, Milnacipran, Neomycin-polymyxin-hc, Propofol, Sulfa (sulfonamide antibiotics), and Capsaicin-menthol    ???????????????????????????????????????????????????????????????  Triage Vitals:  T 36.6 °C (97.9 °F)  HR 90  BP (!) 154/96  RR 18  O2 98 %      Physical Exam  Vitals and nursing note reviewed.   Constitutional:       General: She is not in acute distress.     Appearance: She is well-developed.   HENT:      Head: Normocephalic and atraumatic.      Mouth/Throat:      Mouth: Mucous membranes are dry.      Pharynx: Oropharynx is clear.   Eyes:      Conjunctiva/sclera: Conjunctivae normal.   Cardiovascular:      Rate and Rhythm: Normal rate and regular rhythm.      Heart sounds: No murmur heard.  Pulmonary:      Effort: Pulmonary effort is normal. No respiratory distress.      Breath sounds: Normal breath sounds. No wheezing, rhonchi or rales.   Abdominal:      General: There is no distension.      Palpations: Abdomen is soft.      Tenderness: There is no abdominal tenderness. There is no guarding or rebound.   Musculoskeletal:         General: No swelling or tenderness.      Cervical back: Neck supple.      Comments: In wheelchair with posterior lower back surgical site well approximated and healing with c/d/I site   Skin:     General: Skin is warm and dry.      Capillary Refill: Capillary refill takes less than 2 seconds.       Findings: Bruising (surrounding areas of abrasions and laceration) present.      Comments: 3 cm dorsal laceration over distal forearm that is linear and well approximated. 1 puncture wound over proximal dorsal forearm.    Neurological:      Mental Status: She is alert and oriented to person, place, and time.   Psychiatric:         Mood and Affect: Mood normal.       ??????????????????????????????????????????????????????????  ED Course  ED Course as of 05/22/24 2144   Wed May 22, 2024   2144 Extensive irrigation complete [RR]      ED Course User Index  [RR] Leticia Hayes MD       Medical Decision Making:  Patient is a 63-year-old with past medical history of spinal stenosis status post spinal cord stimulator, fibromyalgia and Sjogren's disease presenting due to dog bite.  Patient's wound was thoroughly irrigated after x-rays were obtained that was negative for any noted foreign body.  Patient was given her first dose of antibitoics and given significant allergy history, patient was given a combination of metronidazole and doxycycline to take for the next 5 days.  Patient does not have any retained foreign body and wash her arm for 5 minutes under tap water.  Patient was given strict return precautions that she verbalized understanding to and was discharged in hemodynamically stable condition.      External records reviewed: recent inpatient, clinic, and prior ED notes  Diagnostic imaging independently reviewed/interpreted by me (as reflected in MDM) includes: xray  Social Determinants Affecting Care: None identified  Discussion of management with other providers: attending  Prescription Drug Consideration: doxycycline and metronidazole  Escalation of Care: none    Impression:   Dog bite  Laceration    Disposition: Discharge      Procedures ? SmartLinks last updated 5/22/2024 9:44 PM     Leticia Hayes  PGY-2 Emergency Medicine  [facility]     Leticia Hayes  MD  Resident  05/22/24 1191

## 2024-05-24 ENCOUNTER — OFFICE VISIT (OUTPATIENT)
Dept: PRIMARY CARE | Facility: CLINIC | Age: 64
End: 2024-05-24
Payer: MEDICAID

## 2024-05-24 VITALS
TEMPERATURE: 97.3 F | SYSTOLIC BLOOD PRESSURE: 145 MMHG | WEIGHT: 145 LBS | BODY MASS INDEX: 25.69 KG/M2 | HEIGHT: 63 IN | DIASTOLIC BLOOD PRESSURE: 88 MMHG | OXYGEN SATURATION: 99 % | HEART RATE: 77 BPM

## 2024-05-24 DIAGNOSIS — M50.30 DDD (DEGENERATIVE DISC DISEASE), CERVICAL: ICD-10-CM

## 2024-05-24 DIAGNOSIS — G89.29 OTHER CHRONIC PAIN: ICD-10-CM

## 2024-05-24 PROCEDURE — 99214 OFFICE O/P EST MOD 30 MIN: CPT | Performed by: NURSE PRACTITIONER

## 2024-05-24 PROCEDURE — 3077F SYST BP >= 140 MM HG: CPT | Performed by: NURSE PRACTITIONER

## 2024-05-24 PROCEDURE — 3079F DIAST BP 80-89 MM HG: CPT | Performed by: NURSE PRACTITIONER

## 2024-05-24 RX ORDER — DULOXETIN HYDROCHLORIDE 60 MG/1
60 CAPSULE, DELAYED RELEASE ORAL 2 TIMES DAILY
Qty: 180 CAPSULE | Refills: 3 | Status: SHIPPED | OUTPATIENT
Start: 2024-05-24

## 2024-05-24 RX ORDER — TRAMADOL HYDROCHLORIDE 50 MG/1
50 TABLET ORAL EVERY 4 HOURS PRN
Qty: 150 TABLET | Refills: 0 | Status: SHIPPED | OUTPATIENT
Start: 2024-05-24 | End: 2024-06-23

## 2024-05-24 RX ORDER — PREGABALIN 100 MG/1
100 CAPSULE ORAL 2 TIMES DAILY
Qty: 60 CAPSULE | Refills: 2 | Status: SHIPPED | OUTPATIENT
Start: 2024-05-24 | End: 2024-08-22

## 2024-05-24 ASSESSMENT — ENCOUNTER SYMPTOMS
DEPRESSION: 0
OCCASIONAL FEELINGS OF UNSTEADINESS: 1
LOSS OF SENSATION IN FEET: 1

## 2024-05-24 ASSESSMENT — PAIN SCALES - GENERAL: PAINLEVEL: 6

## 2024-05-24 NOTE — PROGRESS NOTES
"Subjective   Cynthia Cantrell is a 63 y.o. female who presents for Follow-up.  HPI    All systems reviewed. Review of systems negative except for noted positives in HPI    Objective     /88 (BP Location: Right arm, Patient Position: Sitting, BP Cuff Size: Large adult)   Pulse 77   Temp 36.3 °C (97.3 °F) (Skin)   Ht 1.6 m (5' 3\")   Wt 65.8 kg (145 lb)   LMP  (LMP Unknown)   SpO2 99%   BMI 25.69 kg/m²    Vital signs noted and reviewed.   Needs pain meds  Just had pain stimulator  And has been at pain management  Still having a lot of pain  Needs refill on Cymbalta used for pain management  Feels about the same  Stated that she feels she will get better   Missed her orencia infusion due to pain management procedure  Son is out of country but she has friends who are helping      Physical Exam  Vitals and nursing note reviewed.   Constitutional:       Appearance: Normal appearance.   HENT:      Head: Normocephalic and atraumatic.      Right Ear: Tympanic membrane normal.      Left Ear: Tympanic membrane normal.      Nose: Nose normal.      Mouth/Throat:      Mouth: Mucous membranes are moist.   Eyes:      Extraocular Movements: Extraocular movements intact.      Pupils: Pupils are equal, round, and reactive to light.   Cardiovascular:      Rate and Rhythm: Normal rate and regular rhythm.      Pulses: Normal pulses.      Heart sounds: Normal heart sounds.   Pulmonary:      Effort: Pulmonary effort is normal.      Breath sounds: Normal breath sounds.   Abdominal:      General: Bowel sounds are normal.      Palpations: Abdomen is soft.   Musculoskeletal:      Cervical back: Normal range of motion and neck supple.   Skin:     General: Skin is warm and dry.   Neurological:      Mental Status: She is alert and oriented to person, place, and time.   Psychiatric:         Behavior: Behavior normal.             Assessment/Plan   Problem List Items Addressed This Visit    None  Visit Diagnoses       DDD " (degenerative disc disease), cervical        Relevant Medications    traMADol (Ultram) 50 mg tablet    pregabalin (Lyrica) 100 mg capsule    Other chronic pain        Relevant Medications    DULoxetine (Cymbalta) 60 mg DR capsule                Oaars reviewed  Meds renewed  See back in 4-6 weeks sooner if needed

## 2024-06-18 DIAGNOSIS — M35.01 SJOGREN'S SYNDROME WITH KERATOCONJUNCTIVITIS SICCA (MULTI): Primary | ICD-10-CM

## 2024-06-18 RX ORDER — PILOCARPINE HYDROCHLORIDE 5 MG/1
5 TABLET, FILM COATED ORAL 2 TIMES DAILY
Qty: 180 TABLET | Refills: 2 | Status: SHIPPED | OUTPATIENT
Start: 2024-06-18

## 2024-06-28 DIAGNOSIS — M50.30 DDD (DEGENERATIVE DISC DISEASE), CERVICAL: ICD-10-CM

## 2024-06-29 RX ORDER — TRAMADOL HYDROCHLORIDE 50 MG/1
50 TABLET ORAL EVERY 4 HOURS PRN
Qty: 150 TABLET | Refills: 0 | Status: SHIPPED | OUTPATIENT
Start: 2024-06-29 | End: 2024-07-29

## 2024-08-05 ENCOUNTER — OFFICE VISIT (OUTPATIENT)
Dept: ORTHOPEDIC SURGERY | Facility: HOSPITAL | Age: 64
End: 2024-08-05
Payer: MEDICAID

## 2024-08-05 ENCOUNTER — HOSPITAL ENCOUNTER (OUTPATIENT)
Dept: RADIOLOGY | Facility: EXTERNAL LOCATION | Age: 64
Discharge: HOME | End: 2024-08-05

## 2024-08-05 VITALS — HEIGHT: 63 IN | BODY MASS INDEX: 22.15 KG/M2 | WEIGHT: 125 LBS

## 2024-08-05 DIAGNOSIS — M25.511 RIGHT SHOULDER PAIN, UNSPECIFIED CHRONICITY: ICD-10-CM

## 2024-08-05 DIAGNOSIS — M19.011 ARTHRITIS OF RIGHT ACROMIOCLAVICULAR JOINT: Primary | ICD-10-CM

## 2024-08-05 PROCEDURE — 1036F TOBACCO NON-USER: CPT | Performed by: EMERGENCY MEDICINE

## 2024-08-05 PROCEDURE — 2500000004 HC RX 250 GENERAL PHARMACY W/ HCPCS (ALT 636 FOR OP/ED): Performed by: EMERGENCY MEDICINE

## 2024-08-05 PROCEDURE — 3008F BODY MASS INDEX DOCD: CPT | Performed by: EMERGENCY MEDICINE

## 2024-08-05 PROCEDURE — 99214 OFFICE O/P EST MOD 30 MIN: CPT | Performed by: EMERGENCY MEDICINE

## 2024-08-05 PROCEDURE — 2500000005 HC RX 250 GENERAL PHARMACY W/O HCPCS: Performed by: EMERGENCY MEDICINE

## 2024-08-05 PROCEDURE — 20606 DRAIN/INJ JOINT/BURSA W/US: CPT | Mod: RT | Performed by: EMERGENCY MEDICINE

## 2024-08-05 RX ORDER — TRIAMCINOLONE ACETONIDE 40 MG/ML
40 INJECTION, SUSPENSION INTRA-ARTICULAR; INTRAMUSCULAR
Status: COMPLETED | OUTPATIENT
Start: 2024-08-05 | End: 2024-08-05

## 2024-08-05 RX ORDER — LIDOCAINE HYDROCHLORIDE 10 MG/ML
1 INJECTION INFILTRATION; PERINEURAL
Status: COMPLETED | OUTPATIENT
Start: 2024-08-05 | End: 2024-08-05

## 2024-08-05 ASSESSMENT — PAIN SCALES - GENERAL: PAINLEVEL_OUTOF10: 8

## 2024-08-05 ASSESSMENT — PAIN - FUNCTIONAL ASSESSMENT: PAIN_FUNCTIONAL_ASSESSMENT: 0-10

## 2024-08-05 NOTE — PROGRESS NOTES
"Subjective   Cynthia Cantrell is a 63 y.o. female who presents for Pain of the Right Shoulder    Right Shoulder       8/5/24: Patient returns today for right shoulder pain.  We did perform a right shoulder ultrasound-guided AC joint corticosteroid injection for her on 2/3/2023.  She did feel that the injection worked quite well for her.  However, her pain has since returned and become progressively worse over the past couple months.  Considering this, she would like to have a repeat AC joint injection today.  No additional complaints.    2/3/23: Cynthia returns today for a new problem of right shoulder pain. She has a history of severe rheumatoid arthritis along with lumbar spinal stenosis. She recently had a back surgery in November and has been wheelchair-bound mostly except for walking short distances at home. The patient denies any trauma to her right shoulder but has been having worsening pain over the superior lateral aspect of the right shoulder for the past year. The pain is intermittent and worse with abduction and reaching across her body with her right upper extremity. Today the pain is rated 5 out of 10 and is worse with using the right upper extremity. Resting and her infusions for her rheumatoid arthritis make the pain better. She denies any known trauma or falls. No other complaints here today.     ROS: All pertinent positive symptoms are included in the history of present illness.    All other systems have been reviewed and are negative and noncontributory to this patient's current ailments.    Objective     Vitals:    08/05/24 1304   Weight: 56.7 kg (125 lb)   Height: 1.6 m (5' 3\")       Physical Exam  General/Constitutional: No apparent distress. Well-nourished and well developed.  Head: Normocephalic, Atraumatic.   Eyes: EOMI.  Vascular: No edema, swelling or tenderness, except as noted in detailed exam.  Respiratory: Non-labored breathing.  Integumentary: No impressive skin lesions " present, except as noted in detailed exam.  Neurological: Alert and oriented.  Psychological: Normal mood and affect.  Musculoskeletal: Normal, except as noted in detailed exam.  The right shoulder is without obvious signs of acute bony deformity, swelling, erythema or ecchymosis. There is no tenderness along the joint line. There is tenderness at the AC joint. There is no tenderness at the SC joint, but she does have some swelling at that site which is not ttp and is chronic. Active range of motion is full, painful but symmetrical. Passive range of motion is full, pain-free and symmetrical. Impingement signs are positive with nears test, Sanchez and crossover. Speeds test is negative. Rotator cuff strength is full but painful with testing. The neck and opposite shoulder are otherwise normal and stable.     Patient ID: Cynthia Cantrell is a 63 y.o. female.    M Inj/Asp: R acromioclavicular on 8/5/2024 1:48 PM  Indications: pain  Details: 25 G needle, ultrasound-guided posterior approach  Medications: 40 mg triamcinolone acetonide 40 mg/mL; 1 mL lidocaine 10 mg/mL (1 %)  Outcome: tolerated well, no immediate complications  Procedure, treatment alternatives, risks and benefits explained, specific risks discussed. Consent was given by the patient. Immediately prior to procedure a time out was called to verify the correct patient, procedure, equipment, support staff and site/side marked as required. Patient was prepped and draped in the usual sterile fashion.         Assessment/Plan   Problem List Items Addressed This Visit    None  Visit Diagnoses       Arthritis of right acromioclavicular joint    -  Primary    Right shoulder pain, unspecified chronicity        Relevant Orders    Point of Care Ultrasound (Completed)          Considering that she did well with the previous injection, I do feel that a repeat injection is warranted today.  Discussed risks versus benefits of having injection performed. Patient has  elected to proceed with procedure. Please refer to procedure note above. Discussed with patient to limit overhead and lifting activities over the next 24-48 hours, they can then progress to full activities as tolerated. Discussed with patient to avoid water submersion over the next 2 days. Discussed with patient to call me immediately if they develop worsening pain, rash, erythema, or fevers. Patient should follow-up as needed if pain persist or worsens.    Zak Wall, [facility]  [facility], [facility] [place]    ** Please excuse any errors in grammar or translation related to this dictation. Voice recognition software was utilized to prepare this document. **

## 2024-08-19 ENCOUNTER — APPOINTMENT (OUTPATIENT)
Dept: RHEUMATOLOGY | Facility: CLINIC | Age: 64
End: 2024-08-19
Payer: MEDICAID

## 2024-08-19 VITALS
WEIGHT: 153 LBS | SYSTOLIC BLOOD PRESSURE: 110 MMHG | HEART RATE: 81 BPM | DIASTOLIC BLOOD PRESSURE: 78 MMHG | BODY MASS INDEX: 27.1 KG/M2

## 2024-08-19 DIAGNOSIS — M05.79 RHEUMATOID ARTHRITIS INVOLVING MULTIPLE SITES WITH POSITIVE RHEUMATOID FACTOR (MULTI): Primary | ICD-10-CM

## 2024-08-19 PROCEDURE — 3074F SYST BP LT 130 MM HG: CPT | Performed by: INTERNAL MEDICINE

## 2024-08-19 PROCEDURE — 99214 OFFICE O/P EST MOD 30 MIN: CPT | Performed by: INTERNAL MEDICINE

## 2024-08-19 PROCEDURE — 1036F TOBACCO NON-USER: CPT | Performed by: INTERNAL MEDICINE

## 2024-08-19 PROCEDURE — 3078F DIAST BP <80 MM HG: CPT | Performed by: INTERNAL MEDICINE

## 2024-08-19 RX ORDER — TRAMADOL HYDROCHLORIDE 50 MG/1
50 TABLET ORAL EVERY 6 HOURS PRN
Qty: 40 TABLET | Refills: 0 | Status: SHIPPED | OUTPATIENT
Start: 2024-08-19 | End: 2024-09-08

## 2024-08-19 ASSESSMENT — PAIN SCALES - GENERAL: PAINLEVEL: 5

## 2024-08-19 NOTE — PROGRESS NOTES
Subjective   Patient ID: Cynthia Cantrell is a 63 y.o. female who presents for Follow-up (6-month follow-up visit. ) and Med Refill (Patient is requesting a temporary supply of Tramadol for Rheumatoid Arthritis, Mixed Connected Tissue diseases, and Back issues. Patient PCP will not refill it until her next appt. On 9/06/24. ).    HPI   · Rheumatoid arthritis (714.0) (M06.9)   · Vitamin D deficiency (268.9) (E55.9)   · Sjogrens syndrome (710.2) (M35.00)   · History of COVID (079.89) (U07.1)   · Long-term use of Plaquenil (V58.69) (Z79.899)   · Encounter for long-term current use of high risk medication (V58.69) (Z79.899)    No flare of RA  Needs Tramadol until sees her Tramadol provider can see her in sept   Stim stimulator helps pain  Injection in R shoulder   Pain L hip injected still painful when sitting  Still has pain r wrist I can inject that today    Went over all her meds on her extensive list.  She is continuing with all without dose change.  Orencia  Working well occasional flare utilizing a Medrol Dosepak  PAIN:  SWELLING:   AM GEL:  SIDE EFFECTS OF MED:    LAST LAB  Lab Results   Component Value Date    SEDRATE 4 03/08/2023    TSH 3.11 04/15/2019         PHYSICAL EXAM  NODES   HEART  LUNGS  ABDOMEN   VASCULAR  NEURO   SKIN  JOINTS some synovial thickening at the right wrist there is no evidence for any active synovitis elsewhere in the upper or lower extremity  There is currently no information documented on the homunculus. Go to the Rheumatology activity and complete the homunculus joint exam.   Assessment/Plan   There are no diagnoses linked to this encounter.  Doing well no flares  She will continue all of her medications including Plaquenil leflunomide tramadol pilocarpine pregabalin and on occasion a Medrol Dosepak.  Labs normal in December 2023.Patient ID:  Will get labs today  Gave # 40 Tramadol until next PCP appt who gives her Tramadol awaits appt  Cynthia Cantrell is a 63 y.o.  female.    Procedures  I will see her back in 6 months

## 2024-08-30 ENCOUNTER — PATIENT MESSAGE (OUTPATIENT)
Dept: RHEUMATOLOGY | Facility: CLINIC | Age: 64
End: 2024-08-30
Payer: MEDICAID

## 2024-08-30 DIAGNOSIS — M06.9 RHEUMATOID ARTHRITIS INVOLVING MULTIPLE SITES, UNSPECIFIED WHETHER RHEUMATOID FACTOR PRESENT (MULTI): ICD-10-CM

## 2024-08-30 DIAGNOSIS — E55.9 VITAMIN D DEFICIENCY: Primary | ICD-10-CM

## 2024-08-30 RX ORDER — LEFLUNOMIDE 20 MG/1
TABLET ORAL
Qty: 90 TABLET | Refills: 2 | Status: SHIPPED | OUTPATIENT
Start: 2024-08-30

## 2024-08-30 RX ORDER — NICOTINE 11MG/24HR
2000 PATCH, TRANSDERMAL 24 HOURS TRANSDERMAL DAILY
Qty: 90 CAPSULE | Refills: 3 | Status: SHIPPED | OUTPATIENT
Start: 2024-08-30

## 2024-08-30 NOTE — PATIENT COMMUNICATION
Prescription Request for Leflunomide and Vitamin D        Has The Patient Been Identified By Name And Date Of Birth: Yes    RX Requestor: Yes    Date of Last Refill: Leflunomide last filled on 10/19/23 Vitamin D 11/10/23    Date Of Last Office Visit: 8/19/24 Dr. Richmond    Date Of Future Office Visit: 02/24/25 Dr. Richmond

## 2024-09-03 ENCOUNTER — OFFICE VISIT (OUTPATIENT)
Dept: CARDIOLOGY | Facility: HOSPITAL | Age: 64
End: 2024-09-03
Payer: MEDICAID

## 2024-09-03 VITALS
BODY MASS INDEX: 27.46 KG/M2 | SYSTOLIC BLOOD PRESSURE: 129 MMHG | WEIGHT: 155 LBS | HEIGHT: 63 IN | DIASTOLIC BLOOD PRESSURE: 96 MMHG

## 2024-09-03 DIAGNOSIS — Z00.00 HEALTHCARE MAINTENANCE: ICD-10-CM

## 2024-09-03 DIAGNOSIS — I21.4 NSTEMI (NON-ST ELEVATED MYOCARDIAL INFARCTION) (MULTI): ICD-10-CM

## 2024-09-03 DIAGNOSIS — I10 ESSENTIAL HYPERTENSION: Primary | ICD-10-CM

## 2024-09-03 DIAGNOSIS — I25.10 CORONARY ARTERIOSCLEROSIS IN NATIVE ARTERY: ICD-10-CM

## 2024-09-03 LAB
ATRIAL RATE: 95 BPM
P AXIS: 48 DEGREES
P OFFSET: 190 MS
P ONSET: 133 MS
PR INTERVAL: 186 MS
Q ONSET: 226 MS
QRS COUNT: 16 BEATS
QRS DURATION: 70 MS
QT INTERVAL: 332 MS
QTC CALCULATION(BAZETT): 417 MS
QTC FREDERICIA: 386 MS
R AXIS: 5 DEGREES
T AXIS: 57 DEGREES
T OFFSET: 392 MS
VENTRICULAR RATE: 95 BPM

## 2024-09-03 PROCEDURE — 3008F BODY MASS INDEX DOCD: CPT | Performed by: INTERNAL MEDICINE

## 2024-09-03 PROCEDURE — 99214 OFFICE O/P EST MOD 30 MIN: CPT | Performed by: INTERNAL MEDICINE

## 2024-09-03 PROCEDURE — 1036F TOBACCO NON-USER: CPT | Performed by: INTERNAL MEDICINE

## 2024-09-03 PROCEDURE — 3080F DIAST BP >= 90 MM HG: CPT | Performed by: INTERNAL MEDICINE

## 2024-09-03 PROCEDURE — 3074F SYST BP LT 130 MM HG: CPT | Performed by: INTERNAL MEDICINE

## 2024-09-03 PROCEDURE — 93005 ELECTROCARDIOGRAM TRACING: CPT | Performed by: INTERNAL MEDICINE

## 2024-09-03 ASSESSMENT — PATIENT HEALTH QUESTIONNAIRE - PHQ9
1. LITTLE INTEREST OR PLEASURE IN DOING THINGS: NOT AT ALL
SUM OF ALL RESPONSES TO PHQ9 QUESTIONS 1 AND 2: 0
2. FEELING DOWN, DEPRESSED OR HOPELESS: NOT AT ALL

## 2024-09-03 ASSESSMENT — ENCOUNTER SYMPTOMS
DEPRESSION: 1
LOSS OF SENSATION IN FEET: 1
OCCASIONAL FEELINGS OF UNSTEADINESS: 1

## 2024-09-03 ASSESSMENT — PAIN SCALES - GENERAL: PAINLEVEL: 0-NO PAIN

## 2024-09-03 NOTE — PROGRESS NOTES
09/03/24  2:37 PM    Cardiovascular Medicine    This terrific 63 year-old woman is sen in f/u of CAD with NSTEMI 4.2019 and LASHAE in RCA (3.5 x 38 mm Bethanie stent) she had mild coronary artery disease elsewhere. She has normal LVEF. She has HTN and hyperlipidemia and mixed connective tissue disorder.     Since I saw her last in the winter, she has had health issues. She has continued to have some struggles post spine surgery and his working with Dr. Winston.  She had a spinal cord stimulator placed.    She is not exercising as much as in the past and has regained ~ 12#. She has been eating a lot of prepared foods.    She denies chest pain or dyspnea. No bleeding on aspirin Rx. She had 1 episode of a tachycardia to 130 bpm while exercising seen on her Apple Watch.  Just happened once. Had another episode prior to our last visit.    Slight swelling. She wears compression.      Past Medical History:   Diagnosis Date    Encounter for general adult medical examination without abnormal findings 10/29/2018    Encounter for preventive health examination    Fibromyalgia 11/12/2021    Fibromyalgia    Old myocardial infarction 11/16/2020    Old myocardial infarct    Other overlap syndromes (Multi) 10/10/2016    Mixed connective tissue disease    Personal history of other diseases of the digestive system     History of esophageal reflux    Personal history of other diseases of the musculoskeletal system and connective tissue     History of arthritis    Personal history of other infectious and parasitic diseases 04/28/2016    History of herpes zoster    Rheumatoid arthritis, unspecified (Multi) 05/16/2022    Rheumatoid arthritis    Sjogren syndrome, unspecified (Multi)     History of Sjogren's disease    Unspecified perforation of tympanic membrane, right ear 07/29/2016    Perforated tympanic membrane, right    Urinary tract infection, site not specified 10/21/2022    Acute lower UTI    Urinary tract infection, site not specified  08/20/2021    Acute lower UTI     Patient Active Problem List   Diagnosis    Acquired pes planus of right foot    Acquired spondylolisthesis    Allergic reaction to food    Ankle fracture, left    Chest pain    Arthritis of left hip    Arthritis of midfoot    Astigmatism of both eyes    Bimalleolar fracture    Brow ptosis, bilateral    Clavicle enlargement    Conductive hearing loss of right ear with unrestricted hearing of left ear    Coronary arteriosclerosis in native artery    Dark stools    Decreased visual acuity    Dermatochalasis of left upper eyelid    Dermatochalasis of right upper eyelid    Dry eye syndrome of bilateral lacrimal glands    Dysuria    Familial porphyria cutanea tarda (Multi)    Chronic back pain    Chronic pain syndrome    Fibromyalgia    Fluid collection of middle ear    Fracture of foot, closed    GERD (gastroesophageal reflux disease)    History of acute otitis externa    History of heart artery stent    Hyperlipidemia    Mixed hyperlipidemia    Hypertension    Insomnia    Hyperopia with presbyopia of both eyes    Irregular menses    Low vitamin D level    Lumbar radiculopathy    Lumbar spondylosis    Cervical radiculitis    Lumbosacral neuritis    Lumbosacral stenosis    Medial epicondylitis of left elbow    Melanocytic nevi of trunk    Melanocytic nevi of other parts of face    Hemangioma of skin and subcutaneous tissue    Multiple nevi    Muscle spasm    Myocardial infarct (Multi)    NSTEMI (non-ST elevated myocardial infarction) (Multi)    Myopia, bilateral    Neoplasm of unspecified behavior of bone, soft tissue, and skin    Other seborrheic keratosis    Other specified systemic involvement of connective tissue (Multi)    Otitis media of right ear with rupture of tympanic membrane    Plantar wart of right foot    Positive sm/RNP antibody    Post-traumatic headache, not intractable    Ptosis of both eyelids    Rash and other nonspecific skin eruption    Mixed collagen vascular  disease (Multi)    Rheumatoid arthritis (Multi)    Sjogrens syndrome (Multi)    Right upper quadrant abdominal pain of unknown etiology    Seasonal allergies    Sicca syndrome (Multi)    Other hypertrophic disorders of the skin    Subjective tinnitus, right    Urinary frequency    Vitamin D deficiency    Lumbar stenosis with neurogenic claudication     Current Outpatient Medications   Medication Sig Dispense Refill    abatacept (Orencia, with maltose,) 250 mg injection Infuse into a venous catheter.      aspirin 81 mg EC tablet 1 tablet DAILY (route: oral)      atorvastatin (Lipitor) 80 mg tablet Take 1 tablet (80 mg) by mouth once daily at bedtime.      cyclobenzaprine (Flexeril) 10 mg tablet Take 1 tablet (10 mg) by mouth 3 times a day as needed for muscle spasms. 60 tablet 11    desloratadine (Clarinex) 5 mg tablet Take 1 tablet (5 mg) by mouth once daily as needed.      diclofenac sodium (Voltaren) 1 % gel Place 112.5 inches (100 g) on the skin.      DULoxetine (Cymbalta) 60 mg DR capsule Take 1 capsule (60 mg) by mouth 2 times a day. 180 capsule 3    EPINEPHrine 0.3 mg/0.3 mL injection syringe Inject 0.3 mL (0.3 mg) into the muscle 1 time if needed for anaphylaxis for up to 1 dose. As Directed 2 each 11    fluoride, sodium, (PreviDent 5000 Dry Mouth) 1.1 % dental paste Apply to teeth.      leflunomide (Arava) 20 mg tablet 1 tablet DAILY (route: oral) 90 tablet 2    metoprolol succinate XL (Toprol-XL) 50 mg 24 hr tablet Take 1 tablet (50 mg) by mouth once daily. 90 tablet 3    naloxone (Narcan) 4 mg/0.1 mL nasal spray Administer 1 spray (4 mg) into affected nostril(s) if needed for opioid reversal. May repeat every 2-3 minutes if needed, alternating nostrils, until medical assistance becomes available. 2 each 0    omeprazole (PriLOSEC) 40 mg DR capsule Take 1 capsule (40 mg) by mouth once daily.      pilocarpine (Salagen) 5 mg tablet Take 1 tablet (5 mg) by mouth 2 times a day. 180 tablet 2    polyvinyl alcohol  "(Liquifilm Tears) 1.4 % ophthalmic solution Administer 1-2 drops into affected eye(s) 4 times a day.      pregabalin (Lyrica) 100 mg capsule Take 1 capsule (100 mg) by mouth 2 times a day. 60 capsule 2    traMADol (Ultram) 50 mg tablet Take 1 tablet (50 mg) by mouth every 6 hours if needed for moderate pain (4 - 6) for up to 20 days. 40 tablet 0    traZODone (Desyrel) 100 mg tablet Take 1 tablet (100 mg) by mouth as needed at bedtime for sleep (1 to 3 tablets at night (100 mg to 300 mg at night as needed)) for up to 270 doses. 270 tablet 2    Vitamin D3 50 mcg (2,000 unit) capsule Take 1 capsule (50 mcg) by mouth once daily. 90 capsule 3     No current facility-administered medications for this visit.     BP fine at home    Physical exam  Patient Vitals for the past 24 hrs:   BP Height Weight   09/03/24 1358 (!) 129/96 -- --   09/03/24 1351 (!) 145/95 1.6 m (5' 3\") 70.3 kg (155 lb)   She is in no distress  HEENT OK  JVP flat  +S1, S2, RRR; grade I/VI systolic murmur  Clear lungs  Abd soft, benign, no bruits  Brisk pulses at ankle   She is alert, oriented  Fluid speech appropriate affect    Today's ECG NSR At 95 beats per minute. Normal intervals/axis. No ischemic changes.     11.2022 Stress test - Dobutamine Echo, no ischemia  Summary:  1. The resting ejection fraction was estimated at 70 to 75% with a peak exercise ejection fraction estimated at >75%.  2. Hyperdynamic global left ventricular systolic function.  3. No clinical, echocardiographic or electrocardiographic evidence for ischemia at a maximal infusion.  4. No ECG changes from baseline.  5. Normal Stress Test.     [id number] Benton Muro MD  Electronically signed on 11/15/2022 at 6:12:24 PM    Assessment/Plan: 63-year-old woman seen in f/u of CAD. She has a history of drug-eluting stent placement in the right coronary artery in 2019 for NSTEMI. She is now on single agent aspirin. She is doing fine from a cardiac perspective without angina or HF sx. She is " tolerating aspirin. She has rare palpitations, once every 6 months or so on metoprolol.      BP high; has regained weight, not been exercising due to spine issues and back pain.    At this time, BP high today -- will have her monitor. Can escalate metoprolol or add ARB if remains elevated. She is getting back on track with diet/exercise/weight loss.    Await repeat LDL. If not < 70 mg/dL I'd switch to rosuvastatin 40 mg QD. Ideally would be < 55 mg/dL.    She will let me know if tachycardia reoccurs/becomes more frequent.    Continue light compression hose.    RTC 9 months or sooner PRN; interval follow-up on BP trends.    Rebecca Larson MD

## 2024-09-03 NOTE — PATIENT INSTRUCTIONS
Continue medications as prescribed.  Ordered blood work - we will reach out with results.    See you back in 9 months for follow up.     Rebecca Larson MD  Co-Director, Vascular Center  [facility] [place], [facility]   [facility] Master Clinician in Fibromuscular Dysplasia and Vascular Care  Professor of Medicine  [facility]

## 2024-09-03 NOTE — LETTER
September 3, 2024     Antonietta Candelario, APRN-CNP  [address] [address]    Patient: Cynthia Cantrell   YOB: 1960   Date of Visit: 9/3/2024       Dear Dr. Antonietta Candelario, APRN-CNP:    Thank you for referring Cynthia Cantrell to me for evaluation. Below are my notes for this consultation.  If you have questions, please do not hesitate to call me. I look forward to following your patient along with you.       Sincerely,     Rebecca Larson MD      CC: No Recipients  ______________________________________________________________________________________    09/03/24  2:37 PM    Cardiovascular Medicine    This terrific 63 year-old woman is sen in f/u of CAD with NSTEMI 4.2019 and LASHAE in RCA (3.5 x 38 mm Bethanie stent) she had mild coronary artery disease elsewhere. She has normal LVEF. She has HTN and hyperlipidemia and mixed connective tissue disorder.     Since I saw her last in the winter, she has had health issues. She has continued to have some struggles post spine surgery and his working with Dr. Winston.  She had a spinal cord stimulator placed.    She is not exercising as much as in the past and has regained ~ 12#. She has been eating a lot of prepared foods.    She denies chest pain or dyspnea. No bleeding on aspirin Rx. She had 1 episode of a tachycardia to 130 bpm while exercising seen on her Apple Watch.  Just happened once. Had another episode prior to our last visit.    Slight swelling. She wears compression.      Past Medical History:   Diagnosis Date   • Encounter for general adult medical examination without abnormal findings 10/29/2018    Encounter for preventive health examination   • Fibromyalgia 11/12/2021    Fibromyalgia   • Old myocardial infarction 11/16/2020    Old myocardial infarct   • Other overlap syndromes (Multi) 10/10/2016    Mixed connective tissue disease   • Personal history of other diseases of the digestive system      History of esophageal reflux   • Personal history of other diseases of the musculoskeletal system and connective tissue     History of arthritis   • Personal history of other infectious and parasitic diseases 04/28/2016    History of herpes zoster   • Rheumatoid arthritis, unspecified (Multi) 05/16/2022    Rheumatoid arthritis   • Sjogren syndrome, unspecified (Multi)     History of Sjogren's disease   • Unspecified perforation of tympanic membrane, right ear 07/29/2016    Perforated tympanic membrane, right   • Urinary tract infection, site not specified 10/21/2022    Acute lower UTI   • Urinary tract infection, site not specified 08/20/2021    Acute lower UTI     Patient Active Problem List   Diagnosis   • Acquired pes planus of right foot   • Acquired spondylolisthesis   • Allergic reaction to food   • Ankle fracture, left   • Chest pain   • Arthritis of left hip   • Arthritis of midfoot   • Astigmatism of both eyes   • Bimalleolar fracture   • Brow ptosis, bilateral   • Clavicle enlargement   • Conductive hearing loss of right ear with unrestricted hearing of left ear   • Coronary arteriosclerosis in native artery   • Dark stools   • Decreased visual acuity   • Dermatochalasis of left upper eyelid   • Dermatochalasis of right upper eyelid   • Dry eye syndrome of bilateral lacrimal glands   • Dysuria   • Familial porphyria cutanea tarda (Multi)   • Chronic back pain   • Chronic pain syndrome   • Fibromyalgia   • Fluid collection of middle ear   • Fracture of foot, closed   • GERD (gastroesophageal reflux disease)   • History of acute otitis externa   • History of heart artery stent   • Hyperlipidemia   • Mixed hyperlipidemia   • Hypertension   • Insomnia   • Hyperopia with presbyopia of both eyes   • Irregular menses   • Low vitamin D level   • Lumbar radiculopathy   • Lumbar spondylosis   • Cervical radiculitis   • Lumbosacral neuritis   • Lumbosacral stenosis   • Medial epicondylitis of left elbow   •  Melanocytic nevi of trunk   • Melanocytic nevi of other parts of face   • Hemangioma of skin and subcutaneous tissue   • Multiple nevi   • Muscle spasm   • Myocardial infarct (Multi)   • NSTEMI (non-ST elevated myocardial infarction) (Multi)   • Myopia, bilateral   • Neoplasm of unspecified behavior of bone, soft tissue, and skin   • Other seborrheic keratosis   • Other specified systemic involvement of connective tissue (Multi)   • Otitis media of right ear with rupture of tympanic membrane   • Plantar wart of right foot   • Positive sm/RNP antibody   • Post-traumatic headache, not intractable   • Ptosis of both eyelids   • Rash and other nonspecific skin eruption   • Mixed collagen vascular disease (Multi)   • Rheumatoid arthritis (Multi)   • Sjogrens syndrome (Multi)   • Right upper quadrant abdominal pain of unknown etiology   • Seasonal allergies   • Sicca syndrome (Multi)   • Other hypertrophic disorders of the skin   • Subjective tinnitus, right   • Urinary frequency   • Vitamin D deficiency   • Lumbar stenosis with neurogenic claudication     Current Outpatient Medications   Medication Sig Dispense Refill   • abatacept (Orencia, with maltose,) 250 mg injection Infuse into a venous catheter.     • aspirin 81 mg EC tablet 1 tablet DAILY (route: oral)     • atorvastatin (Lipitor) 80 mg tablet Take 1 tablet (80 mg) by mouth once daily at bedtime.     • cyclobenzaprine (Flexeril) 10 mg tablet Take 1 tablet (10 mg) by mouth 3 times a day as needed for muscle spasms. 60 tablet 11   • desloratadine (Clarinex) 5 mg tablet Take 1 tablet (5 mg) by mouth once daily as needed.     • diclofenac sodium (Voltaren) 1 % gel Place 112.5 inches (100 g) on the skin.     • DULoxetine (Cymbalta) 60 mg DR capsule Take 1 capsule (60 mg) by mouth 2 times a day. 180 capsule 3   • EPINEPHrine 0.3 mg/0.3 mL injection syringe Inject 0.3 mL (0.3 mg) into the muscle 1 time if needed for anaphylaxis for up to 1 dose. As Directed 2 each 11  "  • fluoride, sodium, (PreviDent 5000 Dry Mouth) 1.1 % dental paste Apply to teeth.     • leflunomide (Arava) 20 mg tablet 1 tablet DAILY (route: oral) 90 tablet 2   • metoprolol succinate XL (Toprol-XL) 50 mg 24 hr tablet Take 1 tablet (50 mg) by mouth once daily. 90 tablet 3   • naloxone (Narcan) 4 mg/0.1 mL nasal spray Administer 1 spray (4 mg) into affected nostril(s) if needed for opioid reversal. May repeat every 2-3 minutes if needed, alternating nostrils, until medical assistance becomes available. 2 each 0   • omeprazole (PriLOSEC) 40 mg DR capsule Take 1 capsule (40 mg) by mouth once daily.     • pilocarpine (Salagen) 5 mg tablet Take 1 tablet (5 mg) by mouth 2 times a day. 180 tablet 2   • polyvinyl alcohol (Liquifilm Tears) 1.4 % ophthalmic solution Administer 1-2 drops into affected eye(s) 4 times a day.     • pregabalin (Lyrica) 100 mg capsule Take 1 capsule (100 mg) by mouth 2 times a day. 60 capsule 2   • traMADol (Ultram) 50 mg tablet Take 1 tablet (50 mg) by mouth every 6 hours if needed for moderate pain (4 - 6) for up to 20 days. 40 tablet 0   • traZODone (Desyrel) 100 mg tablet Take 1 tablet (100 mg) by mouth as needed at bedtime for sleep (1 to 3 tablets at night (100 mg to 300 mg at night as needed)) for up to 270 doses. 270 tablet 2   • Vitamin D3 50 mcg (2,000 unit) capsule Take 1 capsule (50 mcg) by mouth once daily. 90 capsule 3     No current facility-administered medications for this visit.     BP fine at home    Physical exam  Patient Vitals for the past 24 hrs:   BP Height Weight   09/03/24 1358 (!) 129/96 -- --   09/03/24 1351 (!) 145/95 1.6 m (5' 3\") 70.3 kg (155 lb)   She is in no distress  HEENT OK  JVP flat  +S1, S2, RRR; grade I/VI systolic murmur  Clear lungs  Abd soft, benign, no bruits  Brisk pulses at ankle   She is alert, oriented  Fluid speech appropriate affect    Today's ECG NSR At 95 beats per minute. Normal intervals/axis. No ischemic changes.     11.2022 Stress test - " Dobutamine Echo, no ischemia  Summary:  1. The resting ejection fraction was estimated at 70 to 75% with a peak exercise ejection fraction estimated at >75%.  2. Hyperdynamic global left ventricular systolic function.  3. No clinical, echocardiographic or electrocardiographic evidence for ischemia at a maximal infusion.  4. No ECG changes from baseline.  5. Normal Stress Test.     [id number] Benton Muro MD  Electronically signed on 11/15/2022 at 6:12:24 PM    Assessment/Plan: 63-year-old woman seen in f/u of CAD. She has a history of drug-eluting stent placement in the right coronary artery in 2019 for NSTEMI. She is now on single agent aspirin. She is doing fine from a cardiac perspective without angina or HF sx. She is tolerating aspirin. She has rare palpitations, once every 6 months or so on metoprolol.      BP high; has regained weight, not been exercising due to spine issues and back pain.    At this time, BP high today -- will have her monitor. Can escalate metoprolol or add ARB if remains elevated. She is getting back on track with diet/exercise/weight loss.    Await repeat LDL. If not < 70 mg/dL I'd switch to rosuvastatin 40 mg QD. Ideally would be < 55 mg/dL.    She will let me know if tachycardia reoccurs/becomes more frequent.    Continue light compression hose.    RTC 9 months or sooner PRN; interval follow-up on BP trends.    Rebecca Larson MD

## 2024-09-06 ENCOUNTER — TELEPHONE (OUTPATIENT)
Dept: CARDIOLOGY | Facility: HOSPITAL | Age: 64
End: 2024-09-06

## 2024-09-06 ENCOUNTER — OFFICE VISIT (OUTPATIENT)
Dept: PRIMARY CARE | Facility: CLINIC | Age: 64
End: 2024-09-06
Payer: MEDICAID

## 2024-09-06 VITALS
BODY MASS INDEX: 27.46 KG/M2 | TEMPERATURE: 96.4 F | DIASTOLIC BLOOD PRESSURE: 85 MMHG | HEIGHT: 63 IN | RESPIRATION RATE: 14 BRPM | HEART RATE: 84 BPM | OXYGEN SATURATION: 96 % | SYSTOLIC BLOOD PRESSURE: 123 MMHG

## 2024-09-06 DIAGNOSIS — N93.9 VAGINAL BLEEDING: Primary | ICD-10-CM

## 2024-09-06 DIAGNOSIS — G47.00 INSOMNIA, UNSPECIFIED TYPE: ICD-10-CM

## 2024-09-06 DIAGNOSIS — M05.79 RHEUMATOID ARTHRITIS INVOLVING MULTIPLE SITES WITH POSITIVE RHEUMATOID FACTOR (MULTI): ICD-10-CM

## 2024-09-06 DIAGNOSIS — E78.00 PURE HYPERCHOLESTEROLEMIA: ICD-10-CM

## 2024-09-06 DIAGNOSIS — M50.30 DDD (DEGENERATIVE DISC DISEASE), CERVICAL: ICD-10-CM

## 2024-09-06 DIAGNOSIS — G89.29 OTHER CHRONIC PAIN: ICD-10-CM

## 2024-09-06 PROCEDURE — 99214 OFFICE O/P EST MOD 30 MIN: CPT | Performed by: NURSE PRACTITIONER

## 2024-09-06 PROCEDURE — 81001 URINALYSIS AUTO W/SCOPE: CPT | Performed by: NURSE PRACTITIONER

## 2024-09-06 PROCEDURE — 1036F TOBACCO NON-USER: CPT | Performed by: NURSE PRACTITIONER

## 2024-09-06 PROCEDURE — 3079F DIAST BP 80-89 MM HG: CPT | Performed by: NURSE PRACTITIONER

## 2024-09-06 PROCEDURE — 3074F SYST BP LT 130 MM HG: CPT | Performed by: NURSE PRACTITIONER

## 2024-09-06 RX ORDER — PREGABALIN 100 MG/1
100 CAPSULE ORAL 2 TIMES DAILY
Qty: 60 CAPSULE | Refills: 2 | Status: SHIPPED | OUTPATIENT
Start: 2024-09-06 | End: 2024-12-05

## 2024-09-06 RX ORDER — TIZANIDINE 4 MG/1
4 TABLET ORAL EVERY 6 HOURS PRN
Qty: 90 TABLET | Refills: 2 | Status: SHIPPED | OUTPATIENT
Start: 2024-09-06 | End: 2024-09-16

## 2024-09-06 RX ORDER — DULOXETIN HYDROCHLORIDE 60 MG/1
60 CAPSULE, DELAYED RELEASE ORAL 2 TIMES DAILY
Qty: 180 CAPSULE | Refills: 3 | Status: SHIPPED | OUTPATIENT
Start: 2024-09-06

## 2024-09-06 RX ORDER — TRAMADOL HYDROCHLORIDE 50 MG/1
50 TABLET ORAL EVERY 4 HOURS PRN
Qty: 180 TABLET | Refills: 0 | Status: SHIPPED | OUTPATIENT
Start: 2024-09-06 | End: 2024-10-06

## 2024-09-06 RX ORDER — TRAZODONE HYDROCHLORIDE 100 MG/1
300 TABLET ORAL NIGHTLY PRN
Qty: 270 TABLET | Refills: 2 | Status: SHIPPED | OUTPATIENT
Start: 2024-09-06

## 2024-09-06 ASSESSMENT — PAIN SCALES - GENERAL: PAINLEVEL: 6

## 2024-09-06 NOTE — PROGRESS NOTES
"Subjective   Cynthia Cantrell is a 63 y.o. female who presents for Follow-up and Med Refill.  Med Refill      Here formed refill  Ran out of meds  RA doctor did give some meds  A lot of pain  On ultram q 4hour prn  Oars reviewed    + vaginal spotting  Have not had that in a while  Urinary issues denied  Stopped period around [id number]   Stared period at age 11  +hormonal therapy during pregnancy  Just had 1 to 2 episodes over the past week    All systems reviewed. Review of systems negative except for noted positives in HPI    Objective     /85   Pulse 84   Temp 35.8 °C (96.4 °F)   Resp 14   Ht 1.6 m (5' 3\")   LMP  (LMP Unknown)   SpO2 96%   BMI 27.46 kg/m²    Vital signs noted and reviewed.       Physical Exam  Vitals and nursing note reviewed.   Constitutional:       Appearance: Normal appearance.   HENT:      Head: Normocephalic and atraumatic.      Right Ear: Tympanic membrane normal.      Left Ear: Tympanic membrane normal.      Nose: Nose normal.      Mouth/Throat:      Mouth: Mucous membranes are dry.   Eyes:      Extraocular Movements: Extraocular movements intact.      Pupils: Pupils are equal, round, and reactive to light.   Cardiovascular:      Rate and Rhythm: Normal rate and regular rhythm.      Pulses: Normal pulses.      Heart sounds: Normal heart sounds.   Pulmonary:      Effort: Pulmonary effort is normal.      Breath sounds: Normal breath sounds.   Abdominal:      General: Bowel sounds are normal.      Palpations: Abdomen is soft.   Musculoskeletal:         General: Normal range of motion.      Cervical back: Normal range of motion.   Skin:     General: Skin is warm and dry.      Capillary Refill: Capillary refill takes less than 2 seconds.   Neurological:      General: No focal deficit present.      Mental Status: She is alert and oriented to person, place, and time.   Psychiatric:         Mood and Affect: Mood normal.     Essentially wheelchair dependent        Assessment/Plan "   Problem List Items Addressed This Visit          Multi-system (Lupus, Sarcoid)    Rheumatoid arthritis (Multi)    Relevant Medications    traMADol (Ultram) 50 mg tablet       Sleep    Insomnia    Relevant Medications    traZODone (Desyrel) 100 mg tablet     Other Visit Diagnoses       Vaginal bleeding    -  Primary    Relevant Orders    Urinalysis with Reflex Microscopic    US pelvis    Other chronic pain        Relevant Medications    DULoxetine (Cymbalta) 60 mg DR capsule    DDD (degenerative disc disease), cervical        Relevant Medications    tiZANidine (Zanaflex) 4 mg tablet    pregabalin (Lyrica) 100 mg capsule                Discussed to get us of pelvis   Ua  Will need to get referral to see gyn for referral to gyn  Possible endometrial biopsy    See back virtually in one month if she thinks she will need pain meds  Trying to do dance and yoga to stay active and minimize her arthritis

## 2024-09-07 LAB
APPEARANCE UR: CLEAR
BILIRUB UR STRIP.AUTO-MCNC: NEGATIVE MG/DL
COLOR UR: ABNORMAL
GLUCOSE UR STRIP.AUTO-MCNC: NORMAL MG/DL
KETONES UR STRIP.AUTO-MCNC: NEGATIVE MG/DL
LEUKOCYTE ESTERASE UR QL STRIP.AUTO: ABNORMAL
MUCOUS THREADS #/AREA URNS AUTO: NORMAL /LPF
NITRITE UR QL STRIP.AUTO: NEGATIVE
PH UR STRIP.AUTO: 6 [PH]
PROT UR STRIP.AUTO-MCNC: NEGATIVE MG/DL
RBC # UR STRIP.AUTO: NEGATIVE /UL
RBC #/AREA URNS AUTO: NORMAL /HPF
SP GR UR STRIP.AUTO: 1.01
SQUAMOUS #/AREA URNS AUTO: NORMAL /HPF
UROBILINOGEN UR STRIP.AUTO-MCNC: NORMAL MG/DL
WBC #/AREA URNS AUTO: NORMAL /HPF

## 2024-09-07 RX ORDER — ATORVASTATIN CALCIUM 80 MG/1
80 TABLET, FILM COATED ORAL NIGHTLY
Qty: 90 TABLET | Refills: 3 | Status: SHIPPED | OUTPATIENT
Start: 2024-09-07 | End: 2025-09-07

## 2024-09-12 ENCOUNTER — HOSPITAL ENCOUNTER (OUTPATIENT)
Dept: RADIOLOGY | Facility: EXTERNAL LOCATION | Age: 64
Discharge: HOME | End: 2024-09-12

## 2024-09-12 ENCOUNTER — HOSPITAL ENCOUNTER (OUTPATIENT)
Dept: RADIOLOGY | Facility: HOSPITAL | Age: 64
Discharge: HOME | End: 2024-09-12
Payer: MEDICAID

## 2024-09-12 ENCOUNTER — OFFICE VISIT (OUTPATIENT)
Dept: ORTHOPEDIC SURGERY | Facility: HOSPITAL | Age: 64
End: 2024-09-12
Payer: MEDICAID

## 2024-09-12 DIAGNOSIS — M25.512 LEFT SHOULDER PAIN, UNSPECIFIED CHRONICITY: ICD-10-CM

## 2024-09-12 DIAGNOSIS — M19.012 ARTHRITIS OF LEFT SHOULDER REGION: Primary | ICD-10-CM

## 2024-09-12 PROCEDURE — 20611 DRAIN/INJ JOINT/BURSA W/US: CPT | Mod: LT | Performed by: EMERGENCY MEDICINE

## 2024-09-12 PROCEDURE — 2500000004 HC RX 250 GENERAL PHARMACY W/ HCPCS (ALT 636 FOR OP/ED): Performed by: EMERGENCY MEDICINE

## 2024-09-12 PROCEDURE — 99214 OFFICE O/P EST MOD 30 MIN: CPT | Performed by: EMERGENCY MEDICINE

## 2024-09-12 PROCEDURE — 73030 X-RAY EXAM OF SHOULDER: CPT | Mod: LT

## 2024-09-12 PROCEDURE — 2500000005 HC RX 250 GENERAL PHARMACY W/O HCPCS: Performed by: EMERGENCY MEDICINE

## 2024-09-12 RX ORDER — TRIAMCINOLONE ACETONIDE 40 MG/ML
80 INJECTION, SUSPENSION INTRA-ARTICULAR; INTRAMUSCULAR
Status: COMPLETED | OUTPATIENT
Start: 2024-09-12 | End: 2024-09-12

## 2024-09-12 RX ORDER — LIDOCAINE HYDROCHLORIDE 10 MG/ML
3 INJECTION INFILTRATION; PERINEURAL
Status: COMPLETED | OUTPATIENT
Start: 2024-09-12 | End: 2024-09-12
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
## 2024-09-13 ENCOUNTER — HOSPITAL ENCOUNTER (OUTPATIENT)
Dept: RADIOLOGY | Facility: CLINIC | Age: 64
Discharge: HOME | End: 2024-09-13
Payer: MEDICAID

## 2024-09-13 DIAGNOSIS — N93.9 VAGINAL BLEEDING: ICD-10-CM

## 2024-09-13 PROCEDURE — 76856 US EXAM PELVIC COMPLETE: CPT

## 2024-09-20 ENCOUNTER — TELEPHONE (OUTPATIENT)
Dept: PRIMARY CARE | Facility: CLINIC | Age: 64
End: 2024-09-20
Payer: MEDICAID

## 2024-09-20 DIAGNOSIS — N93.9 VAGINAL BLEEDING: Primary | ICD-10-CM

## 2024-09-25 ENCOUNTER — LAB (OUTPATIENT)
Dept: LAB | Facility: LAB | Age: 64
End: 2024-09-25
Payer: MEDICAID

## 2024-09-25 DIAGNOSIS — I25.10 CORONARY ARTERIOSCLEROSIS IN NATIVE ARTERY: ICD-10-CM

## 2024-09-25 DIAGNOSIS — M05.79 RHEUMATOID ARTHRITIS INVOLVING MULTIPLE SITES WITH POSITIVE RHEUMATOID FACTOR (MULTI): ICD-10-CM

## 2024-09-25 DIAGNOSIS — Z00.00 HEALTHCARE MAINTENANCE: ICD-10-CM

## 2024-09-25 LAB
ALBUMIN SERPL BCP-MCNC: 3.7 G/DL (ref 3.4–5)
ALP SERPL-CCNC: 54 U/L (ref 33–136)
ALT SERPL W P-5'-P-CCNC: 23 U/L (ref 7–45)
ANION GAP SERPL CALC-SCNC: 8 MMOL/L (ref 10–20)
AST SERPL W P-5'-P-CCNC: 16 U/L (ref 9–39)
BASOPHILS # BLD AUTO: 0.02 X10*3/UL (ref 0–0.1)
BASOPHILS NFR BLD AUTO: 0.3 %
BILIRUB SERPL-MCNC: 0.7 MG/DL (ref 0–1.2)
BUN SERPL-MCNC: 10 MG/DL (ref 6–23)
CALCIUM SERPL-MCNC: 9.1 MG/DL (ref 8.6–10.6)
CHLORIDE SERPL-SCNC: 106 MMOL/L (ref 98–107)
CHOLEST SERPL-MCNC: 172 MG/DL (ref 0–199)
CHOLESTEROL/HDL RATIO: 2.3
CO2 SERPL-SCNC: 28 MMOL/L (ref 21–32)
CREAT SERPL-MCNC: 0.76 MG/DL (ref 0.5–1.05)
CRP SERPL-MCNC: <0.1 MG/DL
EGFRCR SERPLBLD CKD-EPI 2021: 88 ML/MIN/1.73M*2
EOSINOPHIL # BLD AUTO: 0.15 X10*3/UL (ref 0–0.7)
EOSINOPHIL NFR BLD AUTO: 1.9 %
ERYTHROCYTE [DISTWIDTH] IN BLOOD BY AUTOMATED COUNT: 13.3 % (ref 11.5–14.5)
ERYTHROCYTE [SEDIMENTATION RATE] IN BLOOD BY WESTERGREN METHOD: 3 MM/H (ref 0–30)
EST. AVERAGE GLUCOSE BLD GHB EST-MCNC: 105 MG/DL
GLUCOSE SERPL-MCNC: 101 MG/DL (ref 74–99)
HBA1C MFR BLD: 5.3 %
HCT VFR BLD AUTO: 44.6 % (ref 36–46)
HDLC SERPL-MCNC: 74.2 MG/DL
HGB BLD-MCNC: 14.2 G/DL (ref 12–16)
IMM GRANULOCYTES # BLD AUTO: 0.02 X10*3/UL (ref 0–0.7)
IMM GRANULOCYTES NFR BLD AUTO: 0.3 % (ref 0–0.9)
LDLC SERPL CALC-MCNC: 79 MG/DL
LYMPHOCYTES # BLD AUTO: 2.09 X10*3/UL (ref 1.2–4.8)
LYMPHOCYTES NFR BLD AUTO: 26.4 %
MCH RBC QN AUTO: 29.8 PG (ref 26–34)
MCHC RBC AUTO-ENTMCNC: 31.8 G/DL (ref 32–36)
MCV RBC AUTO: 94 FL (ref 80–100)
MONOCYTES # BLD AUTO: 0.57 X10*3/UL (ref 0.1–1)
MONOCYTES NFR BLD AUTO: 7.2 %
NEUTROPHILS # BLD AUTO: 5.07 X10*3/UL (ref 1.2–7.7)
NEUTROPHILS NFR BLD AUTO: 63.9 %
NON HDL CHOLESTEROL: 98 MG/DL (ref 0–149)
NRBC BLD-RTO: 0 /100 WBCS (ref 0–0)
PLATELET # BLD AUTO: 234 X10*3/UL (ref 150–450)
POTASSIUM SERPL-SCNC: 4.4 MMOL/L (ref 3.5–5.3)
PROT SERPL-MCNC: 5.6 G/DL (ref 6.4–8.2)
RBC # BLD AUTO: 4.76 X10*6/UL (ref 4–5.2)
SODIUM SERPL-SCNC: 138 MMOL/L (ref 136–145)
TRIGL SERPL-MCNC: 94 MG/DL (ref 0–149)
VLDL: 19 MG/DL (ref 0–40)
WBC # BLD AUTO: 7.9 X10*3/UL (ref 4.4–11.3)

## 2024-09-25 PROCEDURE — 83036 HEMOGLOBIN GLYCOSYLATED A1C: CPT

## 2024-09-25 PROCEDURE — 85652 RBC SED RATE AUTOMATED: CPT

## 2024-09-25 PROCEDURE — 85025 COMPLETE CBC W/AUTO DIFF WBC: CPT

## 2024-09-25 PROCEDURE — 80053 COMPREHEN METABOLIC PANEL: CPT

## 2024-09-25 PROCEDURE — 80061 LIPID PANEL: CPT

## 2024-09-25 PROCEDURE — 36415 COLL VENOUS BLD VENIPUNCTURE: CPT

## 2024-09-25 PROCEDURE — 86140 C-REACTIVE PROTEIN: CPT

## 2024-09-25 NOTE — RESULT ENCOUNTER NOTE
Please connect with Ms. Cantrell  LDL not at goal  Switch atorva to rosuva 40 QD  I'd like to get LDL < 55 if we can.  Rebecca Larson MD

## 2024-09-27 ENCOUNTER — PATIENT MESSAGE (OUTPATIENT)
Dept: CARDIOLOGY | Facility: HOSPITAL | Age: 64
End: 2024-09-27
Payer: MEDICAID

## 2024-09-27 DIAGNOSIS — E78.2 MIXED HYPERLIPIDEMIA: ICD-10-CM

## 2024-09-27 DIAGNOSIS — I25.10 CORONARY ARTERIOSCLEROSIS IN NATIVE ARTERY: ICD-10-CM

## 2024-09-27 DIAGNOSIS — E78.00 PURE HYPERCHOLESTEROLEMIA: ICD-10-CM

## 2024-09-30 RX ORDER — ASPIRIN 81 MG/1
81 TABLET ORAL DAILY
Qty: 90 TABLET | Refills: 3 | Status: SHIPPED | OUTPATIENT
Start: 2024-09-30 | End: 2025-09-30

## 2024-09-30 RX ORDER — ROSUVASTATIN CALCIUM 20 MG/1
20 TABLET, COATED ORAL DAILY
Qty: 90 TABLET | Refills: 3 | Status: SHIPPED | OUTPATIENT
Start: 2024-09-30 | End: 2025-09-30

## 2024-09-30 RX ORDER — EZETIMIBE 10 MG/1
10 TABLET ORAL DAILY
Qty: 90 TABLET | Refills: 3 | Status: SHIPPED | OUTPATIENT
Start: 2024-09-30 | End: 2025-09-30

## 2024-10-03 ENCOUNTER — OFFICE VISIT (OUTPATIENT)
Dept: PRIMARY CARE | Facility: CLINIC | Age: 64
End: 2024-10-03
Payer: MEDICAID

## 2024-10-03 VITALS
RESPIRATION RATE: 16 BRPM | HEART RATE: 87 BPM | OXYGEN SATURATION: 97 % | TEMPERATURE: 96 F | SYSTOLIC BLOOD PRESSURE: 138 MMHG | DIASTOLIC BLOOD PRESSURE: 94 MMHG

## 2024-10-03 DIAGNOSIS — L30.4 INTERTRIGO: Primary | ICD-10-CM

## 2024-10-03 PROCEDURE — 99213 OFFICE O/P EST LOW 20 MIN: CPT | Performed by: NURSE PRACTITIONER

## 2024-10-03 PROCEDURE — 3080F DIAST BP >= 90 MM HG: CPT | Performed by: NURSE PRACTITIONER

## 2024-10-03 PROCEDURE — 3075F SYST BP GE 130 - 139MM HG: CPT | Performed by: NURSE PRACTITIONER

## 2024-10-03 RX ORDER — NYSTATIN 100000 [USP'U]/G
1 POWDER TOPICAL 2 TIMES DAILY
Qty: 60 G | Refills: 3 | Status: SHIPPED | OUTPATIENT
Start: 2024-10-03 | End: 2025-10-03

## 2024-10-03 RX ORDER — CLOTRIMAZOLE AND BETAMETHASONE DIPROPIONATE 10; .64 MG/G; MG/G
1 CREAM TOPICAL 2 TIMES DAILY
Qty: 45 G | Refills: 1 | Status: SHIPPED | OUTPATIENT
Start: 2024-10-03 | End: 2025-01-31

## 2024-10-03 ASSESSMENT — ENCOUNTER SYMPTOMS
LOSS OF SENSATION IN FEET: 0
OCCASIONAL FEELINGS OF UNSTEADINESS: 0
DEPRESSION: 0

## 2024-10-03 ASSESSMENT — PATIENT HEALTH QUESTIONNAIRE - PHQ9
2. FEELING DOWN, DEPRESSED OR HOPELESS: NOT AT ALL
1. LITTLE INTEREST OR PLEASURE IN DOING THINGS: NOT AT ALL
SUM OF ALL RESPONSES TO PHQ9 QUESTIONS 1 AND 2: 0

## 2024-10-03 ASSESSMENT — PAIN SCALES - GENERAL: PAINLEVEL: 6

## 2024-10-03 NOTE — PROGRESS NOTES
Subjective   Cynthia Cantrell is a 64 y.o. female who presents for Rash.  HPI    Ms. Matos is a 65 yo F here today for rash.  PCP: Mary Candelario   Noticed that rash began about 1 week ago and is very painful and itchy and located under her R breast. The rash began developing after wearing a different bra for a special occasion. It began in a small area and then has spread. Denies other change in soaps, lotions, or detergents.       All systems reviewed. Review of systems negative except for noted positives in HPI    Objective     BP (!) 138/94   Pulse 87   Temp 35.6 °C (96 °F)   Resp 16   LMP  (LMP Unknown)   SpO2 97%    Vital signs noted and reviewed.       Physical Exam  Constitutional:       Appearance: Normal appearance.   Cardiovascular:      Rate and Rhythm: Normal rate and regular rhythm.   Pulmonary:      Effort: Pulmonary effort is normal. No respiratory distress.      Breath sounds: Normal breath sounds.   Skin:     General: Skin is warm and dry.      Comments: Intertrigo under R breast.    Neurological:      Mental Status: She is oriented to person, place, and time.   Psychiatric:         Mood and Affect: Mood normal.             Assessment/Plan   Problem List Items Addressed This Visit    None  Visit Diagnoses       Intertrigo    -  Primary    Relevant Medications    nystatin (Mycostatin) 100,000 unit/gram powder    clotrimazole-betamethasone (Lotrisone) cream

## 2024-10-08 ENCOUNTER — APPOINTMENT (OUTPATIENT)
Dept: OBSTETRICS AND GYNECOLOGY | Facility: CLINIC | Age: 64
End: 2024-10-08
Payer: MEDICAID

## 2024-10-08 DIAGNOSIS — M50.30 DDD (DEGENERATIVE DISC DISEASE), CERVICAL: ICD-10-CM

## 2024-10-08 DIAGNOSIS — K21.9 GASTROESOPHAGEAL REFLUX DISEASE, UNSPECIFIED WHETHER ESOPHAGITIS PRESENT: Primary | ICD-10-CM

## 2024-10-11 RX ORDER — OMEPRAZOLE 40 MG/1
40 CAPSULE, DELAYED RELEASE ORAL DAILY
Qty: 90 CAPSULE | Refills: 3 | Status: SHIPPED | OUTPATIENT
Start: 2024-10-11

## 2024-10-11 RX ORDER — TIZANIDINE 4 MG/1
4 TABLET ORAL EVERY 6 HOURS PRN
Qty: 180 TABLET | Refills: 3 | Status: SHIPPED | OUTPATIENT
Start: 2024-10-11 | End: 2025-01-09

## 2024-10-11 RX ORDER — PREGABALIN 100 MG/1
100 CAPSULE ORAL 2 TIMES DAILY
Qty: 180 CAPSULE | Refills: 0 | Status: SHIPPED | OUTPATIENT
Start: 2024-10-11 | End: 2025-01-09

## 2024-10-25 ENCOUNTER — OFFICE VISIT (OUTPATIENT)
Dept: PRIMARY CARE | Facility: CLINIC | Age: 64
End: 2024-10-25
Payer: MEDICAID

## 2024-10-25 VITALS
TEMPERATURE: 97.2 F | DIASTOLIC BLOOD PRESSURE: 89 MMHG | SYSTOLIC BLOOD PRESSURE: 133 MMHG | OXYGEN SATURATION: 96 % | HEART RATE: 91 BPM | RESPIRATION RATE: 16 BRPM

## 2024-10-25 DIAGNOSIS — M50.30 DDD (DEGENERATIVE DISC DISEASE), CERVICAL: Primary | ICD-10-CM

## 2024-10-25 PROCEDURE — 3079F DIAST BP 80-89 MM HG: CPT | Performed by: NURSE PRACTITIONER

## 2024-10-25 PROCEDURE — 99214 OFFICE O/P EST MOD 30 MIN: CPT | Performed by: NURSE PRACTITIONER

## 2024-10-25 PROCEDURE — 3075F SYST BP GE 130 - 139MM HG: CPT | Performed by: NURSE PRACTITIONER

## 2024-10-25 RX ORDER — CYCLOBENZAPRINE HCL 10 MG
10 TABLET ORAL 3 TIMES DAILY PRN
Qty: 270 TABLET | Refills: 3 | Status: SHIPPED | OUTPATIENT
Start: 2024-10-25 | End: 2024-12-24

## 2024-10-25 RX ORDER — PREGABALIN 100 MG/1
100 CAPSULE ORAL 2 TIMES DAILY
Qty: 180 CAPSULE | Refills: 0 | Status: SHIPPED | OUTPATIENT
Start: 2024-10-25 | End: 2025-01-23

## 2024-10-25 RX ORDER — TRAMADOL HYDROCHLORIDE 50 MG/1
50 TABLET ORAL EVERY 4 HOURS PRN
Qty: 150 TABLET | Refills: 0 | Status: SHIPPED | OUTPATIENT
Start: 2024-10-25 | End: 2024-11-24

## 2024-10-25 ASSESSMENT — PAIN SCALES - GENERAL: PAINLEVEL_OUTOF10: 7

## 2024-10-25 ASSESSMENT — ENCOUNTER SYMPTOMS: PAIN: 1

## 2024-10-25 NOTE — PROGRESS NOTES
Subjective   Cynthia Cantrell is a 64 y.o. female who presents for Pain.  Pain        All systems reviewed. Review of systems negative except for noted positives in HPI    Objective     /89   Pulse 91   Temp 36.2 °C (97.2 °F)   Resp 16   LMP  (LMP Unknown)   SpO2 96%    Vital signs noted and reviewed.       Physical Exam  Vitals and nursing note reviewed.   Constitutional:       Appearance: Normal appearance.   HENT:      Head: Normocephalic and atraumatic.      Right Ear: Tympanic membrane normal.      Left Ear: Tympanic membrane normal.      Nose: Nose normal.      Mouth/Throat:      Mouth: Mucous membranes are moist.   Eyes:      Extraocular Movements: Extraocular movements intact.      Pupils: Pupils are equal, round, and reactive to light.   Cardiovascular:      Rate and Rhythm: Normal rate and regular rhythm.      Pulses: Normal pulses.      Heart sounds: Normal heart sounds.   Pulmonary:      Effort: Pulmonary effort is normal.      Breath sounds: Normal breath sounds.   Abdominal:      General: Bowel sounds are normal.      Palpations: Abdomen is soft.   Musculoskeletal:         General: Normal range of motion.      Cervical back: Normal range of motion.   Skin:     General: Skin is warm.      Capillary Refill: Capillary refill takes less than 2 seconds.   Neurological:      Mental Status: She is alert and oriented to person, place, and time.   Psychiatric:         Mood and Affect: Mood normal.             Assessment/Plan   Problem List Items Addressed This Visit    None  Visit Diagnoses       DDD (degenerative disc disease), cervical    -  Primary    Relevant Medications    traMADol (Ultram) 50 mg tablet    cyclobenzaprine (Flexeril) 10 mg tablet    pregabalin (Lyrica) 100 mg capsule                Pt appears not to have attended gyn appt, will discuss further with her  [employer] reviewed, has pain contract  Meds as noted  See back in 3-4 weeks as discussed

## 2024-10-27 ENCOUNTER — TELEPHONE (OUTPATIENT)
Dept: PRIMARY CARE | Facility: CLINIC | Age: 64
End: 2024-10-27
Payer: MEDICAID

## 2024-10-29 ENCOUNTER — APPOINTMENT (OUTPATIENT)
Dept: OBSTETRICS AND GYNECOLOGY | Facility: CLINIC | Age: 64
End: 2024-10-29
Payer: MEDICAID

## 2024-10-29 VITALS
HEART RATE: 75 BPM | DIASTOLIC BLOOD PRESSURE: 85 MMHG | HEIGHT: 63 IN | BODY MASS INDEX: 27.46 KG/M2 | OXYGEN SATURATION: 92 % | SYSTOLIC BLOOD PRESSURE: 123 MMHG

## 2024-10-29 DIAGNOSIS — N93.9 VAGINAL BLEEDING: ICD-10-CM

## 2024-10-29 DIAGNOSIS — N95.0 POSTMENOPAUSAL BLEEDING: ICD-10-CM

## 2024-10-29 PROCEDURE — 1036F TOBACCO NON-USER: CPT | Performed by: OBSTETRICS & GYNECOLOGY

## 2024-10-29 PROCEDURE — 3074F SYST BP LT 130 MM HG: CPT | Performed by: OBSTETRICS & GYNECOLOGY

## 2024-10-29 PROCEDURE — 3079F DIAST BP 80-89 MM HG: CPT | Performed by: OBSTETRICS & GYNECOLOGY

## 2024-10-29 PROCEDURE — 58100 BIOPSY OF UTERUS LINING: CPT | Performed by: OBSTETRICS & GYNECOLOGY

## 2024-10-29 PROCEDURE — 88305 TISSUE EXAM BY PATHOLOGIST: CPT

## 2024-10-29 PROCEDURE — 88305 TISSUE EXAM BY PATHOLOGIST: CPT | Performed by: PATHOLOGY

## 2024-10-29 ASSESSMENT — PAIN SCALES - GENERAL: PAINLEVEL_OUTOF10: 0-NO PAIN

## 2024-11-05 LAB
LABORATORY COMMENT REPORT: NORMAL
PATH REPORT.COMMENTS IMP SPEC: NORMAL
PATH REPORT.FINAL DX SPEC: NORMAL
PATH REPORT.GROSS SPEC: NORMAL
PATH REPORT.RELEVANT HX SPEC: NORMAL
PATH REPORT.TOTAL CANCER: NORMAL

## 2024-11-15 ENCOUNTER — TELEMEDICINE (OUTPATIENT)
Dept: PRIMARY CARE | Facility: CLINIC | Age: 64
End: 2024-11-15
Payer: MEDICAID

## 2024-11-15 DIAGNOSIS — M50.30 DDD (DEGENERATIVE DISC DISEASE), CERVICAL: ICD-10-CM

## 2024-11-15 PROCEDURE — 99213 OFFICE O/P EST LOW 20 MIN: CPT | Performed by: NURSE PRACTITIONER

## 2024-11-15 RX ORDER — TRAMADOL HYDROCHLORIDE 50 MG/1
50 TABLET ORAL EVERY 4 HOURS PRN
Qty: 150 TABLET | Refills: 0 | Status: SHIPPED | OUTPATIENT
Start: 2024-11-15 | End: 2024-12-15

## 2024-11-15 RX ORDER — PREGABALIN 100 MG/1
100 CAPSULE ORAL 3 TIMES DAILY
Qty: 270 CAPSULE | Refills: 0 | Status: SHIPPED | OUTPATIENT
Start: 2024-11-15 | End: 2025-02-13

## 2024-11-15 NOTE — PROGRESS NOTES
Subjective   Cynthia Cantrell is a 64 y.o. female who presents for No chief complaint on file..  HPI  Needs Pain meds and lyrica  Had biopsy at [facility] two weeks ago still having bleeding  Not sure what next step is    All systems reviewed. Review of systems negative except for noted positives in HPI    Objective     LMP  (LMP Unknown)    Vital signs noted and reviewed.       Physical Exam  No PE since telehealth but pt appears alert and healthy well groomed per video      Assessment/Plan   Problem List Items Addressed This Visit    None  Visit Diagnoses       DDD (degenerative disc disease), cervical        Relevant Medications    traMADol (Ultram) 50 mg tablet    pregabalin (Lyrica) 100 mg capsule                Keep track of bleeding, may need repeat biopsy   Discussed ct scan abd pelvis imaging; she had only ultrasound  Medication renewed as discussed  Has signed contract  [employer] reviewed

## 2024-11-22 ENCOUNTER — APPOINTMENT (OUTPATIENT)
Dept: PRIMARY CARE | Facility: CLINIC | Age: 64
End: 2024-11-22
Payer: MEDICAID

## 2024-12-06 DIAGNOSIS — M05.79 RHEUMATOID ARTHRITIS INVOLVING MULTIPLE SITES WITH POSITIVE RHEUMATOID FACTOR (MULTI): Primary | ICD-10-CM

## 2024-12-16 ENCOUNTER — APPOINTMENT (OUTPATIENT)
Dept: SURGERY | Facility: CLINIC | Age: 64
End: 2024-12-16
Payer: MEDICAID

## 2024-12-16 DIAGNOSIS — M05.79 RHEUMATOID ARTHRITIS INVOLVING MULTIPLE SITES WITH POSITIVE RHEUMATOID FACTOR (MULTI): Primary | ICD-10-CM

## 2024-12-16 PROCEDURE — 99203 OFFICE O/P NEW LOW 30 MIN: CPT | Performed by: SURGERY

## 2024-12-16 NOTE — PROGRESS NOTES
Subjective   Patient ID: Cynthia Cantrell is a 64 y.o. female who presents for No chief complaint on file..  HPI  Patient is a 64-year-old female with history of rheumatoid arthritis, Sjogren's syndrome, chronic pain, spinal stenosis.  She is referred for placement of a Mediport catheter for administration of Orencia.  She gets  infusions on a monthly basis.  Lately there has been difficulty with accessing her veins.      Review of Systems     On review of systems patient denies any weight loss, fever, change in bowel habits, melena, hematochezia, coronary artery disease, diabetes, hypertension, TIA/CVA, bleeding, jaundice, pancreatitis, hepatitis.    Patient does not smoke. Patient does not drink      Lives with her son     Past Medical History:   Diagnosis Date    Encounter for general adult medical examination without abnormal findings 10/29/2018    Encounter for preventive health examination    Fibromyalgia 11/12/2021    Fibromyalgia    Old myocardial infarction 11/16/2020    Old myocardial infarct    Other overlap syndromes 10/10/2016    Mixed connective tissue disease    Personal history of other diseases of the digestive system     History of esophageal reflux    Personal history of other diseases of the musculoskeletal system and connective tissue     History of arthritis    Personal history of other infectious and parasitic diseases 04/28/2016    History of herpes zoster    Rheumatoid arthritis, unspecified 05/16/2022    Rheumatoid arthritis    Sjogren syndrome, unspecified (Multi)     History of Sjogren's disease    Unspecified perforation of tympanic membrane, right ear 07/29/2016    Perforated tympanic membrane, right    Urinary tract infection, site not specified 10/21/2022    Acute lower UTI    Urinary tract infection, site not specified 08/20/2021    Acute lower UTI        Past Surgical History:   Procedure Laterality Date    APPENDECTOMY  09/09/2016    Appendectomy    CT ABDOMEN PELVIS  ANGIOGRAM W AND/OR WO IV CONTRAST  4/11/2019    CT ABDOMEN PELVIS ANGIOGRAM W AND/OR WO IV CONTRAST 4/11/2019 [facility] CLINICAL LEGACY    OTHER SURGICAL HISTORY  09/09/2016    Mastoidectomy, Revision - With Tympanoplasty    OTHER SURGICAL HISTORY  01/07/2021    Foot surgery    Spinal stimulator   Back surgery       Objective     Physical Exam  Wheelchair, but she states that she is able to walk around her house  Well-nourished, well-developed. In no distress  Alert and oriented x 3  Lungs are clear to auscultation bilaterally.  Cardiac exam is regular rhythm and rate.  Abdomen is soft nontender nondistended.  Neurologic exam is without focal deficit.   Assessment/Plan        Impression: 64-year-old female with rheumatoid arthritis, Sjogren's in need of a Mediport catheter for administration of Orencia.    We discussed the procedure to include risk, benefits alternatives.  She agrees to the operation which is tentatively scheduled for early next year

## 2024-12-16 NOTE — LETTER
December 16, 2024     Antonietta Candelario, APRN-CNP  [address] [address]    Patient: Cynthia Cantrell   YOB: 1960   Date of Visit: 12/16/2024       Dear Dr. Antonietta Candelario, APRN-CNP:    Thank you for referring Cynthia Cantrell to me for evaluation. Below are my notes for this consultation.  If you have questions, please do not hesitate to call me. I look forward to following your patient along with you.       Sincerely,     Ady Elise MD      CC: Bryan Richmond, DO  ______________________________________________________________________________________    Subjective  Patient ID: Cynthia Cantrell is a 64 y.o. female who presents for No chief complaint on file..  HPI  Patient is a 64-year-old female with history of rheumatoid arthritis, Sjogren's syndrome, chronic pain, spinal stenosis.  She is referred for placement of a Mediport catheter for administration of Orencia.  She gets  infusions on a monthly basis.  Lately there has been difficulty with accessing her veins.      Review of Systems     On review of systems patient denies any weight loss, fever, change in bowel habits, melena, hematochezia, coronary artery disease, diabetes, hypertension, TIA/CVA, bleeding, jaundice, pancreatitis, hepatitis.    Patient does not smoke. Patient does not drink      Lives with her son     Past Medical History:   Diagnosis Date   • Encounter for general adult medical examination without abnormal findings 10/29/2018    Encounter for preventive health examination   • Fibromyalgia 11/12/2021    Fibromyalgia   • Old myocardial infarction 11/16/2020    Old myocardial infarct   • Other overlap syndromes 10/10/2016    Mixed connective tissue disease   • Personal history of other diseases of the digestive system     History of esophageal reflux   • Personal history of other diseases of the musculoskeletal system and connective tissue     History of arthritis   •  Personal history of other infectious and parasitic diseases 04/28/2016    History of herpes zoster   • Rheumatoid arthritis, unspecified 05/16/2022    Rheumatoid arthritis   • Sjogren syndrome, unspecified (Multi)     History of Sjogren's disease   • Unspecified perforation of tympanic membrane, right ear 07/29/2016    Perforated tympanic membrane, right   • Urinary tract infection, site not specified 10/21/2022    Acute lower UTI   • Urinary tract infection, site not specified 08/20/2021    Acute lower UTI        Past Surgical History:   Procedure Laterality Date   • APPENDECTOMY  09/09/2016    Appendectomy   • CT ABDOMEN PELVIS ANGIOGRAM W AND/OR WO IV CONTRAST  4/11/2019    CT ABDOMEN PELVIS ANGIOGRAM W AND/OR WO IV CONTRAST 4/11/2019 [facility] CLINICAL LEGACY   • OTHER SURGICAL HISTORY  09/09/2016    Mastoidectomy, Revision - With Tympanoplasty   • OTHER SURGICAL HISTORY  01/07/2021    Foot surgery    Spinal stimulator   Back surgery       Objective    Physical Exam  Wheelchair, but she states that she is able to walk around her house  Well-nourished, well-developed. In no distress  Alert and oriented x 3  Lungs are clear to auscultation bilaterally.  Cardiac exam is regular rhythm and rate.  Abdomen is soft nontender nondistended.  Neurologic exam is without focal deficit.   Assessment/Plan       Impression: 64-year-old female with rheumatoid arthritis, Sjogren's in need of a Mediport catheter for administration of Orencia.    We discussed the procedure to include risk, benefits alternatives.  She agrees to the operation which is tentatively scheduled for early next year

## 2024-12-16 NOTE — H&P (VIEW-ONLY)
Subjective   Patient ID: Cynthia Cantrell is a 64 y.o. female who presents for No chief complaint on file..  HPI  Patient is a 64-year-old female with history of rheumatoid arthritis, Sjogren's syndrome, chronic pain, spinal stenosis.  She is referred for placement of a Mediport catheter for administration of Orencia.  She gets  infusions on a monthly basis.  Lately there has been difficulty with accessing her veins.      Review of Systems     On review of systems patient denies any weight loss, fever, change in bowel habits, melena, hematochezia, coronary artery disease, diabetes, hypertension, TIA/CVA, bleeding, jaundice, pancreatitis, hepatitis.    Patient does not smoke. Patient does not drink      Lives with her son     Past Medical History:   Diagnosis Date    Encounter for general adult medical examination without abnormal findings 10/29/2018    Encounter for preventive health examination    Fibromyalgia 11/12/2021    Fibromyalgia    Old myocardial infarction 11/16/2020    Old myocardial infarct    Other overlap syndromes 10/10/2016    Mixed connective tissue disease    Personal history of other diseases of the digestive system     History of esophageal reflux    Personal history of other diseases of the musculoskeletal system and connective tissue     History of arthritis    Personal history of other infectious and parasitic diseases 04/28/2016    History of herpes zoster    Rheumatoid arthritis, unspecified 05/16/2022    Rheumatoid arthritis    Sjogren syndrome, unspecified (Multi)     History of Sjogren's disease    Unspecified perforation of tympanic membrane, right ear 07/29/2016    Perforated tympanic membrane, right    Urinary tract infection, site not specified 10/21/2022    Acute lower UTI    Urinary tract infection, site not specified 08/20/2021    Acute lower UTI        Past Surgical History:   Procedure Laterality Date    APPENDECTOMY  09/09/2016    Appendectomy    CT ABDOMEN PELVIS  ANGIOGRAM W AND/OR WO IV CONTRAST  4/11/2019    CT ABDOMEN PELVIS ANGIOGRAM W AND/OR WO IV CONTRAST 4/11/2019 [facility] CLINICAL LEGACY    OTHER SURGICAL HISTORY  09/09/2016    Mastoidectomy, Revision - With Tympanoplasty    OTHER SURGICAL HISTORY  01/07/2021    Foot surgery    Spinal stimulator   Back surgery       Objective     Physical Exam  Wheelchair, but she states that she is able to walk around her house  Well-nourished, well-developed. In no distress  Alert and oriented x 3  Lungs are clear to auscultation bilaterally.  Cardiac exam is regular rhythm and rate.  Abdomen is soft nontender nondistended.  Neurologic exam is without focal deficit.   Assessment/Plan        Impression: 64-year-old female with rheumatoid arthritis, Sjogren's in need of a Mediport catheter for administration of Orencia.    We discussed the procedure to include risk, benefits alternatives.  She agrees to the operation which is tentatively scheduled for early next year

## 2024-12-17 ENCOUNTER — CLINICAL SUPPORT (OUTPATIENT)
Dept: PREADMISSION TESTING | Facility: HOSPITAL | Age: 64
End: 2024-12-17
Payer: MEDICAID

## 2024-12-17 DIAGNOSIS — M05.79 RHEUMATOID ARTHRITIS INVOLVING MULTIPLE SITES WITH POSITIVE RHEUMATOID FACTOR (MULTI): ICD-10-CM

## 2024-12-17 NOTE — CPM/PAT NURSE NOTE
CPM/PAT Nurse Note      Name: Cynthia Cantrell (Cynthia Cantrell)  /Age: 1960/64 y.o.       Past Medical History:   Diagnosis Date    Adverse effect of anesthesia     anaphylactic shock with appendectomy    Coronary arteriosclerosis in native artery     Coronary artery disease     Fibromyalgia     GERD (gastroesophageal reflux disease)     History of esophageal reflux    History of stress test     normal 2022    Hyperlipidemia     Hypertension     Old myocardial infarction 2020    Old myocardial infarct    Other overlap syndromes 10/10/2016    Mixed connective tissue disease    Personal history of other diseases of the musculoskeletal system and connective tissue     History of arthritis    Personal history of other infectious and parasitic diseases 2016    History of herpes zoster    Rheumatoid arthritis     Rheumatoid arthritis, unspecified 2022    Rheumatoid arthritis    Sjogren syndrome, unspecified (Multi)     History of Sjogren's disease    Status post insertion of spinal cord stimulator     Unspecified perforation of tympanic membrane, right ear 2016    Perforated tympanic membrane, right    Urinary tract infection, site not specified 2021    Acute lower UTI       Past Surgical History:   Procedure Laterality Date    APPENDECTOMY  1972    Appendectomy    CT ABDOMEN PELVIS ANGIOGRAM W AND/OR WO IV CONTRAST  2019    CT ABDOMEN PELVIS ANGIOGRAM W AND/OR WO IV CONTRAST 2019 [facility]    OTHER SURGICAL HISTORY      Mastoidectomy, Revision - With Tympanoplasty    OTHER SURGICAL HISTORY  2021    Foot surgery       Patient  reports that she is not currently sexually active.    Family History   Problem Relation Name Age of Onset    Other (Cardiac abnormality) Mother      Hypothyroidism Mother      Intracerebral hemorrhage Mother         Allergies   Allergen Reactions    [place] Anaphylaxis    Egg Anaphylaxis and Unknown    Furosemide  Anaphylaxis and Swelling    Nut - Unspecified Anaphylaxis    Tree Nut Anaphylaxis    Adalimumab Swelling    Amoxicillin-Pot Clavulanate Angioedema and Swelling    Capsaicin Hives and Unknown    Cephalosporins Swelling and Unknown     swelling    Flaxseed Unknown    Gabapentin Dizziness and Other    Ibuprofen Unknown    Loperamide Angioedema and Unknown    Methotrexate Other, Swelling and Unknown    Milnacipran Swelling    Neomycin-Polymyxin-Hc Unknown    Propofol Swelling and Other    Sulfa (Sulfonamide Antibiotics) Unknown    Sulfacetamide Unknown    Sulfamethoxazole Other and Swelling    Capsaicin-Menthol Unknown and Rash       Prior to Admission medications    Medication Sig Start Date End Date Taking? Authorizing Provider   abatacept (Orencia, with maltose,) 250 mg injection Infuse into a venous catheter. 9/26/17   Historical Provider, MD   aspirin 81 mg EC tablet Take 1 tablet (81 mg) by mouth once daily. 9/30/24 9/30/25  Rebecca Larson MD   clotrimazole-betamethasone (Lotrisone) cream Apply 1 Application topically 2 times a day.  Patient not taking: Reported on 12/17/2024 10/3/24 1/31/25  NAN [place]   cyclobenzaprine (Flexeril) 10 mg tablet Take 1 tablet (10 mg) by mouth 3 times a day as needed for muscle spasms. 10/25/24 12/24/24  NAN [place]   diclofenac sodium (Voltaren) 1 % gel Place 112.5 inches (100 g) on the skin if needed. 9/19/19   Historical Provider, MD   DULoxetine (Cymbalta) 60 mg DR capsule Take 1 capsule (60 mg) by mouth 2 times a day. 9/6/24   NAN [place]   EPINEPHrine 0.3 mg/0.3 mL injection syringe Inject 0.3 mL (0.3 mg) into the muscle 1 time if needed for anaphylaxis for up to 1 dose. As Directed 2/2/24   NAN [place]   ezetimibe (Zetia) 10 mg tablet Take 1 tablet (10 mg) by mouth once daily. 9/30/24 9/30/25  Rebecca Larson MD   fluoride, sodium, (PreviDent 5000 Dry Mouth) 1.1 % dental paste Apply to teeth.  Patient not taking:  Reported on 12/17/2024 10/30/17   Historical Provider, MD   leflunomide (Arava) 20 mg tablet 1 tablet DAILY (route: oral) 8/30/24   Bryan Richmond DO   metoprolol succinate XL (Toprol-XL) 50 mg 24 hr tablet Take 1 tablet (50 mg) by mouth once daily. 2/26/24 2/25/25  Rebecca Larson MD   nystatin (Mycostatin) 100,000 unit/gram powder Apply 1 Application topically 2 times a day.  Patient not taking: Reported on 12/17/2024 10/3/24 10/3/25  NAN [place]   omeprazole (PriLOSEC) 40 mg DR capsule Take 1 capsule (40 mg) by mouth once daily. 10/11/24   NAN [place]   pilocarpine (Salagen) 5 mg tablet Take 1 tablet (5 mg) by mouth 2 times a day. 6/18/24   Bryan Richmond DO   polyvinyl alcohol (Liquifilm Tears) 1.4 % ophthalmic solution Administer 1-2 drops into affected eye(s) 4 times a day. 4/11/18   Historical Provider, MD   pregabalin (Lyrica) 100 mg capsule Take 1 capsule (100 mg) by mouth 3 times a day. 11/15/24 2/13/25  NAN [place]   rosuvastatin (Crestor) 20 mg tablet Take 1 tablet (20 mg) by mouth once daily. 9/30/24 9/30/25  Rebecca Larson MD   traMADol (Ultram) 50 mg tablet Take 1 tablet (50 mg) by mouth every 4 hours if needed for severe pain (7 - 10). 11/15/24 12/15/24  NAN [place]   traZODone (Desyrel) 100 mg tablet Take 3 tablets (300 mg) by mouth as needed at bedtime for sleep (1 to 3 tablets at night (100 mg to 300 mg at night as needed)) for up to 270 doses. 9/6/24   NAN [place]   Vitamin D3 50 mcg (2,000 unit) capsule Take 1 capsule (50 mcg) by mouth once daily. 8/30/24   DO JESUS Hawthorne     DASELLA Risk Score    No data to display       Caprini DVT Assessment    No data to display       Modified Frailty Index    No data to display       CHADS2 Stroke Risk  Current as of about an hour ago        N/A 3 to 100%: High Risk   2 to < 3%: Medium Risk   0 to < 2%: Low Risk     Last Change: N/A          This score determines  the patient's risk of having a stroke if the patient has atrial fibrillation.        This score is not applicable to this patient. Components are not calculated.          Revised Cardiac Risk Index    No data to display       Apfel Simplified Score    No data to display       Risk Analysis Index Results This Encounter    No data found in the last 10 encounters.       Prodigy: High Risk  Total Score: 8              Prodigy Age Score           ARISCAT Score for Postoperative Pulmonary Complications    No data to display       [place] Perioperative Risk for Myocardial Infarction or Cardiac Arrest (CJ)    No data to display         Nurse Plan of Action:       RN screening call complete.  Reviewed allergies, medications and pharmacy, medical, surgical and social history with patient.  Chart updated.

## 2024-12-18 ENCOUNTER — PRE-ADMISSION TESTING (OUTPATIENT)
Dept: PREADMISSION TESTING | Facility: HOSPITAL | Age: 64
End: 2024-12-18
Payer: MEDICAID

## 2024-12-18 ENCOUNTER — LAB (OUTPATIENT)
Dept: LAB | Facility: LAB | Age: 64
End: 2024-12-18
Payer: MEDICAID

## 2024-12-18 VITALS
DIASTOLIC BLOOD PRESSURE: 84 MMHG | BODY MASS INDEX: 26.58 KG/M2 | OXYGEN SATURATION: 95 % | WEIGHT: 150 LBS | HEART RATE: 88 BPM | RESPIRATION RATE: 16 BRPM | TEMPERATURE: 97.1 F | SYSTOLIC BLOOD PRESSURE: 115 MMHG | HEIGHT: 63 IN

## 2024-12-18 DIAGNOSIS — Z01.818 PREOP TESTING: Primary | ICD-10-CM

## 2024-12-18 DIAGNOSIS — Z01.818 PREOP TESTING: ICD-10-CM

## 2024-12-18 LAB
ANION GAP SERPL CALC-SCNC: 9 MMOL/L (ref 10–20)
BASOPHILS # BLD AUTO: 0.07 X10*3/UL (ref 0–0.1)
BASOPHILS NFR BLD AUTO: 1.4 %
BUN SERPL-MCNC: 7 MG/DL (ref 6–23)
CALCIUM SERPL-MCNC: 9.1 MG/DL (ref 8.6–10.3)
CHLORIDE SERPL-SCNC: 103 MMOL/L (ref 98–107)
CO2 SERPL-SCNC: 28 MMOL/L (ref 21–32)
CREAT SERPL-MCNC: 0.72 MG/DL (ref 0.5–1.05)
EGFRCR SERPLBLD CKD-EPI 2021: >90 ML/MIN/1.73M*2
EOSINOPHIL # BLD AUTO: 0.22 X10*3/UL (ref 0–0.7)
EOSINOPHIL NFR BLD AUTO: 4.4 %
ERYTHROCYTE [DISTWIDTH] IN BLOOD BY AUTOMATED COUNT: 12.8 % (ref 11.5–14.5)
GLUCOSE SERPL-MCNC: 94 MG/DL (ref 74–99)
HCT VFR BLD AUTO: 45.8 % (ref 36–46)
HGB BLD-MCNC: 14.5 G/DL (ref 12–16)
IMM GRANULOCYTES # BLD AUTO: 0.01 X10*3/UL (ref 0–0.7)
IMM GRANULOCYTES NFR BLD AUTO: 0.2 % (ref 0–0.9)
LYMPHOCYTES # BLD AUTO: 2.05 X10*3/UL (ref 1.2–4.8)
LYMPHOCYTES NFR BLD AUTO: 40.7 %
MCH RBC QN AUTO: 28.9 PG (ref 26–34)
MCHC RBC AUTO-ENTMCNC: 31.7 G/DL (ref 32–36)
MCV RBC AUTO: 91 FL (ref 80–100)
MONOCYTES # BLD AUTO: 0.41 X10*3/UL (ref 0.1–1)
MONOCYTES NFR BLD AUTO: 8.1 %
NEUTROPHILS # BLD AUTO: 2.28 X10*3/UL (ref 1.2–7.7)
NEUTROPHILS NFR BLD AUTO: 45.2 %
NRBC BLD-RTO: 0 /100 WBCS (ref 0–0)
PLATELET # BLD AUTO: 227 X10*3/UL (ref 150–450)
POTASSIUM SERPL-SCNC: 4.4 MMOL/L (ref 3.5–5.3)
RBC # BLD AUTO: 5.02 X10*6/UL (ref 4–5.2)
SODIUM SERPL-SCNC: 136 MMOL/L (ref 136–145)
WBC # BLD AUTO: 5 X10*3/UL (ref 4.4–11.3)

## 2024-12-18 PROCEDURE — 87081 CULTURE SCREEN ONLY: CPT | Mod: AHULAB | Performed by: PHYSICIAN ASSISTANT

## 2024-12-18 PROCEDURE — 80048 BASIC METABOLIC PNL TOTAL CA: CPT

## 2024-12-18 PROCEDURE — 36415 COLL VENOUS BLD VENIPUNCTURE: CPT

## 2024-12-18 PROCEDURE — 85025 COMPLETE CBC W/AUTO DIFF WBC: CPT

## 2024-12-18 PROCEDURE — 99244 OFF/OP CNSLTJ NEW/EST MOD 40: CPT | Performed by: PHYSICIAN ASSISTANT

## 2024-12-18 RX ORDER — CHLORHEXIDINE GLUCONATE ORAL RINSE 1.2 MG/ML
SOLUTION DENTAL
Qty: 473 ML | Refills: 0 | Status: SHIPPED | OUTPATIENT
Start: 2024-12-18

## 2024-12-18 ASSESSMENT — ENCOUNTER SYMPTOMS
ALLERGIC/IMMUNOLOGIC NEGATIVE: 1
CARDIOVASCULAR NEGATIVE: 1
NEUROLOGICAL NEGATIVE: 1
PSYCHIATRIC NEGATIVE: 1
GASTROINTESTINAL NEGATIVE: 1
MYALGIAS: 1
EYES NEGATIVE: 1
CONSTITUTIONAL NEGATIVE: 1
ARTHRALGIAS: 1
ENDOCRINE NEGATIVE: 1
HEMATOLOGIC/LYMPHATIC NEGATIVE: 1
RESPIRATORY NEGATIVE: 1

## 2024-12-18 NOTE — CPM/PAT H&P
[facility]/PAT Evaluation       Name: Cynthia Cantrell (Cynthia Cantrell)  /Age: 1960/64 y.o.     In-Person       Chief Complaint: Rheumatoid arthritis involving multiple sites with positive rheumatoid factor     HPI      Date of Consult: 24    Referring Provider: Dr. Elise      Surgery, Date, and Length: Line Placement Subcutaneous Port; 24; 60 minutes     Cynthia Cantrell  is a 64 year-old female who presents to the [facility] for perioperative risk assessment prior to surgery. Patient with history of rheumatoid arthritis and Sjogren's syndrome. She is referred for placement of a Mediport catheter for administration of Orencia. She gets infusions on a monthly basis. Lately there has been difficulty with accessing her veins.       This note was created in part upon personal review of patient's medical records.      Patient is scheduled to have Line Placement Subcutaneous Port     Medical History  Past Medical History:   Diagnosis Date    Adverse effect of anesthesia     anaphylactic shock with appendectomy    Coronary artery disease     Fibromyalgia     GERD (gastroesophageal reflux disease)     under control    Hyperlipidemia     Hypertension     Old myocardial infarction 2020    Old myocardial infarct    Other overlap syndromes 10/10/2016    Mixed connective tissue disease    Rheumatoid arthritis     Sjogren syndrome, unspecified (Multi)     Status post insertion of spinal cord stimulator             Surgical History  Past Surgical History:   Procedure Laterality Date    APPENDECTOMY  1972    Appendectomy    CT ABDOMEN PELVIS ANGIOGRAM W AND/OR WO IV CONTRAST  2019    CT ABDOMEN PELVIS ANGIOGRAM W AND/OR WO IV CONTRAST 2019 [facility]    FOOT SURGERY Right     shace bone and internal hardware    LUMBAR LAMINECTOMY      MIDDLE EAR SURGERY Right 1968    mastoidectomy / tympanoplasty    ORIF ANKLE FRACTURE Left     OTHER SURGICAL HISTORY      removal vocal  cord polyps    SPINAL CORD STIMULATOR IMPLANT               Family history:  Family History   Problem Relation Name Age of Onset    Other (Cardiac abnormality) Mother      Hypothyroidism Mother      Intracerebral hemorrhage Mother          Social history:  Social History     Socioeconomic History    Marital status: Legally      Spouse name: Not on file    Number of children: Not on file    Years of education: Not on file    Highest education level: Not on file   Occupational History    Not on file   Tobacco Use    Smoking status: Never    Smokeless tobacco: Never   Vaping Use    Vaping status: Never Used   Substance and Sexual Activity    Alcohol use: Never    Drug use: Never    Sexual activity: Not Currently   Other Topics Concern    Not on file   Social History Narrative    Not on file     Social Drivers of Health     Financial Resource Strain: Not on file   Food Insecurity: High Risk (12/1/2024)    Received from [facility] SDOH Screening     12. at any time in the past year, have you run out of food before you got money to buy more?: Yes   Transportation Needs: Not on file   Physical Activity: Not on file   Stress: Not on file   Social Connections: Not on file   Intimate Partner Violence: Not on file   Housing Stability: High Risk (12/1/2024)    Received from [facility] SDOH Screening     11. does your current living situation have any of the following problems? choose all that apply.: Water leaks     14. in the past year, have you been told that the electric, gas, oil, or water may be shut off in your home?: Yes        Current Outpatient Medications   Medication Instructions    abatacept (Orencia, with maltose,) 250 mg injection Every 28 days    aspirin 81 mg, oral, Daily    chlorhexidine (Peridex) 0.12 % solution 15 ml swish and spit for 30 seconds night prior to surgery and morning of surgery    clotrimazole-betamethasone (Lotrisone) cream 1 Application, Topical, 2 times  "daily    cyclobenzaprine (FLEXERIL) 10 mg, oral, 3 times daily PRN    diclofenac sodium (VOLTAREN) 100 g, As needed    DULoxetine (CYMBALTA) 60 mg, oral, 2 times daily    EPINEPHrine (EPIPEN) 0.3 mg, intramuscular, Once as needed, As Directed    ezetimibe (ZETIA) 10 mg, oral, Daily    fluoride, sodium, (PreviDent 5000 Dry Mouth) 1.1 % dental paste Apply to teeth.    leflunomide (Arava) 20 mg tablet 1 tablet DAILY (route: oral)    metoprolol succinate XL (TOPROL-XL) 50 mg, oral, Daily    nystatin (Mycostatin) 100,000 unit/gram powder 1 Application, Topical, 2 times daily    omeprazole (PRILOSEC) 40 mg, oral, Daily    pilocarpine (SALAGEN) 5 mg, oral, 2 times daily    polyvinyl alcohol (Liquifilm Tears) 1.4 % ophthalmic solution 1-2 drops, 4 times daily    pregabalin (LYRICA) 100 mg, oral, 3 times daily    rosuvastatin (CRESTOR) 20 mg, oral, Daily    traMADol (ULTRAM) 50 mg, oral, Every 4 hours PRN    traZODone (DESYREL) 300 mg, oral, Nightly PRN    Vitamin D3 50 mcg, oral, Daily        Visit Vitals  /84   Pulse 88   Temp 36.2 °C (97.1 °F)   Resp 16   Ht 1.6 m (5' 3\") Comment: stated   Wt 68 kg (150 lb) Comment: stated   LMP  (LMP Unknown)   SpO2 95%   BMI 26.57 kg/m²   OB Status Postmenopausal   Smoking Status Never   BSA 1.74 m²           Review of Systems   Constitutional: Negative.    HENT: Negative.     Eyes: Negative.         Glasses   Respiratory: Negative.     Cardiovascular: Negative.         METS 4   dancing with wheelchair 2-3/week Without chest pain / SOB    Gastrointestinal: Negative.    Endocrine: Negative.    Genitourinary: Negative.    Musculoskeletal:  Positive for arthralgias and myalgias.   Skin: Negative.    Allergic/Immunologic: Negative.    Neurological: Negative.    Hematological: Negative.    Psychiatric/Behavioral: Negative.          Physical Exam  Vitals reviewed.   Constitutional:       Appearance: Normal appearance.   HENT:      Head: Normocephalic and atraumatic.      Right Ear: " External ear normal.      Left Ear: External ear normal.      Nose: Nose normal.      Mouth/Throat:      Pharynx: Oropharynx is clear.   Eyes:      Extraocular Movements: Extraocular movements intact.      Conjunctiva/sclera: Conjunctivae normal.      Pupils: Pupils are equal, round, and reactive to light.   Cardiovascular:      Rate and Rhythm: Normal rate and regular rhythm.      Heart sounds: Normal heart sounds.   Pulmonary:      Effort: Pulmonary effort is normal.      Breath sounds: Normal breath sounds.   Abdominal:      Palpations: Abdomen is soft.   Musculoskeletal:         General: Normal range of motion.      Cervical back: Normal range of motion and neck supple.   Skin:     General: Skin is warm and dry.   Neurological:      General: No focal deficit present.      Mental Status: She is alert and oriented to person, place, and time.   Psychiatric:         Mood and Affect: Mood normal.         Behavior: Behavior normal.          PAT AIRWAY:   Airway:     Mallampati::  II    Neck ROM::  Full   implants    Lab Results   Component Value Date    WBC 5.0 12/18/2024    HGB 14.5 12/18/2024    HCT 45.8 12/18/2024    MCV 91 12/18/2024     12/18/2024      Lab Results   Component Value Date    GLUCOSE 94 12/18/2024    CALCIUM 9.1 12/18/2024     12/18/2024    K 4.4 12/18/2024    CO2 28 12/18/2024     12/18/2024    BUN 7 12/18/2024    CREATININE 0.72 12/18/2024        Lab Results   Component Value Date    HGBA1C 5.3 09/25/2024      Encounter Date: 09/03/24   ECG 12 lead (Clinic Performed)   Result Value    Ventricular Rate 95    Atrial Rate 95    CA Interval 186    QRS Duration 70    QT Interval 332    QTC Calculation(Bazett) 417    P Axis 48    R Axis 5    T Axis 57    QRS Count 16    Q Onset 226    P Onset 133    P Offset 190    T Offset 392    QTC Fredericia 386    Narrative    Normal sinus rhythm  Normal ECG  When compared with ECG of 05-DEC-2023 14:01,  No significant change was found  Confirmed  by Rebecca Larson ([id number]) on 9/10/2024 9:13:15 AM      11.2022 Stress test - Dobutamine Echo, no ischemia  Summary:  1. The resting ejection fraction was estimated at 70 to 75% with a peak exercise ejection fraction estimated at >75%.  2. Hyperdynamic global left ventricular systolic function.  3. No clinical, echocardiographic or electrocardiographic evidence for ischemia at a maximal infusion.  4. No ECG changes from baseline.  5. Normal Stress Test.     [id number] Benton Muro MD  Electronically signed on 11/15/2022 at 6:12:24 PM    Patient is scheduled to have Line Placement Subcutaneous Port     Cardiovascular  METS: 4   RCRI: 1  , 6 % Risk of MACE  Caprini: 4    HTN- metoprolol Continue DOS   HLD- zetia HOLD 24 hours prior to surgery            Rosuvastatin Continue DOS     Follows with Rebecca Larson MD -  9/3/24:  RTC 9 months or sooner PRN     Pulmonary  Stop Bang score is 2 placing patient at low risk for EVARISTO  ARISCAT: <26 points, 1.6% risk of in-hospital postoperative pulmonary complication  PRODIGY: Moderate risk for opioid induced respiratory depression  (score 8)    Hematology       Patient instructed to ambulate as soon as possible postoperatively to decrease thromboembolic risk.      Initiate mechanical DVT prophylaxis as soon as possible and initiate chemical prophylaxis when deemed safe from a bleeding standpoint post surgery.       VTE prophylaxis per surgical team     GI  GERD omeprazole Continue DOS     Tests ordered in PAT: cbc, bmp, mrsa   LABS REVIEWED: unremarkable     Risk assessment complete.  Patient is scheduled for a low surgical risk procedure.        Preoperative medication instructions were provided and reviewed with the patient.  Any additional testing or evaluation was explained to the patient.  Nothing by mouth instructions were discussed and patient's questions were answered prior to conclusion to this encounter.  Patient verbalized understanding of preoperative instructions  given in preadmission testing; discharge instructions available in EMR.

## 2024-12-18 NOTE — PREPROCEDURE INSTRUCTIONS
Medication List            Accurate as of December 18, 2024 12:58 PM. Always use your most recent med list.                aspirin 81 mg EC tablet  Take 1 tablet (81 mg) by mouth once daily.  Medication Adjustments for Surgery: Take on the morning of surgery     clotrimazole-betamethasone cream  Commonly known as: Lotrisone  Apply 1 Application topically 2 times a day.  Medication Adjustments for Surgery: Do Not take on the morning of surgery     cyclobenzaprine 10 mg tablet  Commonly known as: Flexeril  Take 1 tablet (10 mg) by mouth 3 times a day as needed for muscle spasms.  Medication Adjustments for Surgery: Take/Use as prescribed     diclofenac sodium 1 % gel  Commonly known as: Voltaren  Medication Adjustments for Surgery: Do Not take on the morning of surgery     DULoxetine 60 mg DR capsule  Commonly known as: Cymbalta  Take 1 capsule (60 mg) by mouth 2 times a day.  Medication Adjustments for Surgery: Take/Use as prescribed     EPINEPHrine 0.3 mg/0.3 mL injection syringe  Commonly known as: Epipen  Inject 0.3 mL (0.3 mg) into the muscle 1 time if needed for anaphylaxis for up to 1 dose. As Directed  Medication Adjustments for Surgery: Take/Use as prescribed     ezetimibe 10 mg tablet  Commonly known as: Zetia  Take 1 tablet (10 mg) by mouth once daily.  Medication Adjustments for Surgery: Take last dose 1 day (24 hours) before surgery     leflunomide 20 mg tablet  Commonly known as: Arava  1 tablet DAILY (route: oral)  Medication Adjustments for Surgery: Take/Use as prescribed     metoprolol succinate XL 50 mg 24 hr tablet  Commonly known as: Toprol-XL  Take 1 tablet (50 mg) by mouth once daily.  Medication Adjustments for Surgery: Take on the morning of surgery     nystatin 100,000 unit/gram powder  Commonly known as: Mycostatin  Apply 1 Application topically 2 times a day.  Medication Adjustments for Surgery: Do Not take on the morning of surgery     omeprazole 40 mg DR capsule  Commonly known as:  PriLOSEC  Take 1 capsule (40 mg) by mouth once daily.  Medication Adjustments for Surgery: Take on the morning of surgery     Orencia (with maltose) 250 mg injection  Generic drug: abatacept  Notes to patient: Pt takes every 28 days - scheduled 12/30 - which will be 7 days prior suregry     pilocarpine 5 mg tablet  Commonly known as: Salagen  Take 1 tablet (5 mg) by mouth 2 times a day.  Medication Adjustments for Surgery: Take/Use as prescribed     polyvinyl alcohol 1.4 % ophthalmic solution  Commonly known as: Liquifilm Tears  Medication Adjustments for Surgery: Take/Use as prescribed     pregabalin 100 mg capsule  Commonly known as: Lyrica  Take 1 capsule (100 mg) by mouth 3 times a day.  Medication Adjustments for Surgery: Take/Use as prescribed     PreviDent 5000 Dry Mouth 1.1 % dental paste  Generic drug: fluoride (sodium)  Medication Adjustments for Surgery: Take/Use as prescribed     rosuvastatin 20 mg tablet  Commonly known as: Crestor  Take 1 tablet (20 mg) by mouth once daily.  Medication Adjustments for Surgery: Take/Use as prescribed     traMADol 50 mg tablet  Commonly known as: Ultram  Take 1 tablet (50 mg) by mouth every 4 hours if needed for severe pain (7 - 10).  Medication Adjustments for Surgery: Take/Use as prescribed     traZODone 100 mg tablet  Commonly known as: Desyrel  Take 3 tablets (300 mg) by mouth as needed at bedtime for sleep (1 to 3 tablets at night (100 mg to 300 mg at night as needed)) for up to 270 doses.  Notes to patient: Continue night prior to surgery     Vitamin D3 50 mcg (2,000 unit) capsule  Generic drug: cholecalciferol  Take 1 capsule (50 mcg) by mouth once daily.  Medication Adjustments for Surgery: Do Not take on the morning of surgery                 Concerning above medication instructions - If medication is normally taken at night continue normal schedule - do not take night prior and morning of surgery.       Preoperative Deep Breathing Exercises  Why it is important  to do deep breathing exercises before my surgery?  Deep breathing exercises strengthen your breathing muscles.  This helps you to recover after your surgery and decreases the chance of breathing complications.  How are the deep breathing exercises done?  Sit straight with your back supported.  Breathe in deeply and slowly through your nose. Your lower rib cage should expand and your abdomen may move forward.  Hold that breath for 3 to 5 seconds.  Breathe out through pursed lips, slowly and completely.  Rest and repeat 10 times every hour while awake.  Rest longer if you become dizzy or lightheaded.      CONTACT SURGEON'S OFFICE IF YOU DEVELOP:  * Fever = 100.4 F   * New respiratory symptoms (e.g. cough, shortness of breath, respiratory distress, sore throat)  * Recent loss of taste or smell  *Flu like symptoms such as headache, fatigue or gastrointestinal symptoms  * You develop any open sores, shingles, burning or painful urination   AND/OR:  * You no longer wish to have the surgery.  * Any other personal circumstances change that may lead to the need to cancel or defer this surgery.  *You were admitted to any hospital within one week of your planned procedure.    SMOKING:  *Quitting smoking can make a huge difference to your health and recovery from surgery.    *If you need help with quitting, call 8-485-QUIT-NOW.    THE DAY OF SURGERY:  *Do not eat any food after midnight the night before surgery/procedure.   *YOU MUST DRINK 14 oz drink clear liquids (i.e. water, black coffee/tea, (no milk or cream) apple juice, and electrolyte drinks (Gatorade)  2 hours before your instructed arrival time to the hospital.  *You may chew gum until  2 hours before your surgery/procedure.    SURGICAL TIME  *You will be contacted between 2 p.m. and 6 p.m. the business day before your surgery with your arrival time.  *If you haven't received a call by 6pm, call [phone].  *Scheduled surgery times may change and you will be  notified if this occurs-check your personal voicemail for any updates.    ON THE MORNING OF SURGERY:  *Wear comfortable, loose fitting clothing.   *Do not use moisturizers, creams, lotions or perfume.  *All jewelry and valuables should be left at home.  *Prosthetic devices such as contact lenses, hearing aids, dentures, eyelash extensions, hairpins and body piercing must be removed before surgery.    BRING WITH YOU:  *Photo ID and insurance card  *Current list of medicines and allergies  *Pacemaker/Defibrillator/Heart stent cards  *CPAP machine and mask  *Slings/splints/crutches  *Copy of your complete Advanced Directive/DHPOA-if applicable  *Neurostimulator implant remote    PARKING AND ARRIVAL:  *Check in at the Main Entrance desk and let them know you are here for surgery.  *You will be directed to the 2nd floor surgical waiting area.    AFTER OUTPATIENT SURGERY:  *A responsible adult MUST accompany you at the time of discharge and stay with you for 24 hours after your surgery.  *You may NOT drive yourself home after surgery.  *You may use a taxi or ride sharing service ([employer], Uber) to return home ONLY if you are accompanied by a friend or family member.  *Instructions for resuming your medications will be provided by your surgeon.         Home Preoperative Antibacterial Shower     What is a home preoperative antibacterial shower?  This shower is a way of cleaning the skin with a germ killing soap before surgery.  The soap contains chlorhexidine, commonly known as CHG.  CHG is a soap for your skin with germ killing ability.  Let your doctor know if you are allergic to chlorhexidine.    Why do I need to take a preoperative antibacterial shower?  Skin is not sterile.  It is best to try to make your skin as free of germs as possible before surgery.  Proper cleansing with a germ killing soap before surgery can lower the number of germs on your skin.  This helps to reduce the risk of infection at the surgical site.   Following the instructions listed below will help you prepare your skin for surgery.      How do I use the CHG skin cleanser?  Steps:  Begin using your CHG soap five days before your scheduled surgery on ________________________.    Days 1-4 Shower before bed:  Wash your face and genitals with your normal soap and rinse.    Wash and rinse your hair using the CHG soap. Rinse completely, do not condition your   Hair.          3.    Apply the CHG soap to a clean wet washcloth.  Turn the water off or move away                From the water spray to avoid premature rinsing of the CHG soap as you are applying.     4.   Lather your entire body from the neck down.  Do not use on your face or genitals.   Pay special attention to the area(s) where your incision(s) will be located unless they are on your face.  Avoid scrubbing your skin too hard.  The important point is to have the CHG soap sit on your skin for 3 minutes.    When the 3 minutes are up, turn on the water and rinse the CHG soap off your body completely.   Pat yourself dry with a clean, freshly-laundered towel.  Dress in clean, freshly laundered night clothes.    Be sure to sleep with clean, freshly laundered sheets.  Day 5:  Last shower is the morning before surgery: Follow above Instructions.    NOTE:    *Hair extensions should be removed    *Keep CHG soap out of eyes and ear canals   *DO NOT wash with regular soap on your body after you have used the CHG        soap solution  *DO NOT apply powders, lotions, or perfume.  *Deodorant may be used days 1-4, BUT NOT the day of surgery    Who should I contact if I have any questions regarding the use of CHG soap?  Call the [facility], Preadmission Testing at [phone] if you have any questions.              Patient Information: Pre-Operative Infection Prevention Measures     Why did I have my nose, under my arms and groin swabbed?  The purpose of the swab is to identify Staphylococcus  aureus inside your nose or on your skin.  The swab was sent to the laboratory for culture.  A positive swab/culture for Staphylococcus aureus is called colonization or carriage.      What is Staphylococcus aureus?  Staphylococcus aureus, also known as “staph”, is a germ found on the skin or in the nose of healthy people.  Sometimes Staphylococcus aureus can get into the body and cause an infection.  This can be minor (such as pimples, boils or other skin problems).  It might also be serious (such as blood infection, pneumonia or a surgical site infection).    What is Staphylococcus aureus colonization or carriage?  Colonization or carriage means that a person has the germ but is not sick from it.  These bacteria can be spread on the hands or when breathing or sneezing.    How is Staphylococcus aureus spread?  It is most often spread by close contact with a person or item that carries it.    What happens if my culture is positive for Staphylococcus aureus?  Your doctor/medical team will use this information to guide any antibiotic treatment which may be necessary.  Regardless of the culture results, we will clean the inside of your nose with a betadine swab just before you have your surgery.      Will I get an infection if I have Staphylococcus aureus in my nose or on my skin?  Anyone can get an infection with Staphylococcus aureus.  However, the best way to reduce your risk of infection is to follow the instructions provided to you for the use of your CHG soap and dental rinse.        Who should I contact if I have any questions?  Call the [facility], Preadmission Testing at [phone] if you have any questions.           Patient Information: Oral/Dental Rinse  **This is a prescription; pick it up at your preferred local pharmacy **  What is oral/dental rinse?   It is a mouthwash. It is a way of cleaning the mouth with a germ killing solution before your surgery.  The solution  contains chlorhexidine, commonly known as CHG.   It is used inside the mouth to kill a bacteria known as Staphylococcus aureus.  Let your doctor know if you are allergic to Chlorhexidine.    Why do I need to use CHG oral/dental rinse?  The CHG oral/dental rinse helps to kill a bacteria in your mouth known a Staphylococcus aureus.     This reduces the risk of infection at the surgical site.      Using your CHG oral/dental rinse  STEPS:  Use your CHG oral/dental rinse after you brush your teeth the night before (at bedtime) and the morning of your surgery.  Follow all directions on your prescription label.    Use the cap on the container to measure 15ml (fill cap to fill line)  Swish (gargle if you can) the mouthwash in your mouth for at least 30 seconds, (do not to swallow) spit out  After you use your CHG rinse, do not rinse your mouth with water, drink or eat.  Please refer to prescription label for the appropriate time to resume oral intake  Dental rinse comes in one size bottle: 473ml ~16oz.  You will have leftover    rinse, discard after this use.    What side effects might I have using the CHG oral/dental rinse?  CHG rinse will stick to plaque on the teeth.  Brush and floss just before use.  Teeth brushing will help avoid staining of plaque during use.    Who should I contact if I have questions about the CHG oral/dental rinse?  Please call [facility], Preadmission Testing at [phone] if you have any questions

## 2024-12-18 NOTE — CPM/PAT NURSE NOTE
CPM/PAT Nurse Note      Name: Cynthia Cantrell (Cynthia Cantrell)  /Age: 1960/64 y.o.       Past Medical History:   Diagnosis Date    Adverse effect of anesthesia     anaphylactic shock with appendectomy    Coronary arteriosclerosis in native artery     Coronary artery disease     Fibromyalgia     GERD (gastroesophageal reflux disease)     History of esophageal reflux    History of stress test     normal 2022    Hyperlipidemia     Hypertension     Old myocardial infarction 2020    Old myocardial infarct    Other overlap syndromes 10/10/2016    Mixed connective tissue disease    Personal history of other diseases of the musculoskeletal system and connective tissue     History of arthritis    Personal history of other infectious and parasitic diseases 2016    History of herpes zoster    Rheumatoid arthritis     Rheumatoid arthritis, unspecified 2022    Rheumatoid arthritis    Sjogren syndrome, unspecified (Multi)     History of Sjogren's disease    Status post insertion of spinal cord stimulator     Unspecified perforation of tympanic membrane, right ear 2016    Perforated tympanic membrane, right    Urinary tract infection, site not specified 2021    Acute lower UTI       Past Surgical History:   Procedure Laterality Date    APPENDECTOMY  1972    Appendectomy    CT ABDOMEN PELVIS ANGIOGRAM W AND/OR WO IV CONTRAST  2019    CT ABDOMEN PELVIS ANGIOGRAM W AND/OR WO IV CONTRAST 2019 [facility] CLINICAL LEGACY    OTHER SURGICAL HISTORY      Mastoidectomy, Revision - With Tympanoplasty    OTHER SURGICAL HISTORY  2021    Foot surgery       Patient  reports that she is not currently sexually active.    Family History   Problem Relation Name Age of Onset    Other (Cardiac abnormality) Mother      Hypothyroidism Mother      Intracerebral hemorrhage Mother         Allergies   Allergen Reactions    [place] Anaphylaxis    Egg Anaphylaxis and Unknown    Furosemide  Anaphylaxis and Swelling    Nut - Unspecified Anaphylaxis    Tree Nut Anaphylaxis    Adalimumab Swelling    Amoxicillin-Pot Clavulanate Angioedema and Swelling    Capsaicin Hives and Unknown    Cephalosporins Swelling and Unknown     swelling    Flaxseed Unknown    Gabapentin Dizziness and Other    Ibuprofen Unknown    Loperamide Angioedema and Unknown    Methotrexate Other, Swelling and Unknown    Milnacipran Swelling    Neomycin-Polymyxin-Hc Unknown    Propofol Swelling and Other    Sulfa (Sulfonamide Antibiotics) Unknown    Sulfacetamide Unknown    Sulfamethoxazole Other and Swelling    Capsaicin-Menthol Unknown and Rash       Prior to Admission medications    Medication Sig Start Date End Date Taking? Authorizing Provider   abatacept (Orencia, with maltose,) 250 mg injection Infuse into a venous catheter every 28 (twenty-eight) days. 9/26/17  Yes Historical Provider, MD   aspirin 81 mg EC tablet Take 1 tablet (81 mg) by mouth once daily. 9/30/24 9/30/25 Yes Rebecca Larson MD   cyclobenzaprine (Flexeril) 10 mg tablet Take 1 tablet (10 mg) by mouth 3 times a day as needed for muscle spasms. 10/25/24 12/24/24 Yes NAN [place]   diclofenac sodium (Voltaren) 1 % gel Place 112.5 inches (100 g) on the skin if needed. 9/19/19  Yes Historical Provider, MD   DULoxetine (Cymbalta) 60 mg DR capsule Take 1 capsule (60 mg) by mouth 2 times a day. 9/6/24  Yes NAN [place]   ezetimibe (Zetia) 10 mg tablet Take 1 tablet (10 mg) by mouth once daily. 9/30/24 9/30/25 Yes Rebecca Larson MD   leflunomide (Arava) 20 mg tablet 1 tablet DAILY (route: oral) 8/30/24  Yes Bryan Richmond DO   metoprolol succinate XL (Toprol-XL) 50 mg 24 hr tablet Take 1 tablet (50 mg) by mouth once daily. 2/26/24 2/25/25 Yes Rebecca Larson MD   omeprazole (PriLOSEC) 40 mg DR capsule Take 1 capsule (40 mg) by mouth once daily. 10/11/24  Yes NAN [place]   pilocarpine (Salagen) 5 mg tablet Take 1 tablet (5  mg) by mouth 2 times a day. 6/18/24  Yes Bryan Richmond, DO   polyvinyl alcohol (Liquifilm Tears) 1.4 % ophthalmic solution Administer 1-2 drops into affected eye(s) 4 times a day. 4/11/18  Yes Historical Provider, MD   pregabalin (Lyrica) 100 mg capsule Take 1 capsule (100 mg) by mouth 3 times a day. 11/15/24 2/13/25 Yes NAN [place]   rosuvastatin (Crestor) 20 mg tablet Take 1 tablet (20 mg) by mouth once daily. 9/30/24 9/30/25 Yes Rebecca Larson MD   traMADol (Ultram) 50 mg tablet Take 1 tablet (50 mg) by mouth every 4 hours if needed for severe pain (7 - 10). 11/15/24 12/18/24 Yes NAN [place]   traZODone (Desyrel) 100 mg tablet Take 3 tablets (300 mg) by mouth as needed at bedtime for sleep (1 to 3 tablets at night (100 mg to 300 mg at night as needed)) for up to 270 doses. 9/6/24  Yes NAN [place]   Vitamin D3 50 mcg (2,000 unit) capsule Take 1 capsule (50 mcg) by mouth once daily. 8/30/24  Yes Bryan Richmond, DO   chlorhexidine (Peridex) 0.12 % solution 15 ml swish and spit for 30 seconds night prior to surgery and morning of surgery 12/18/24   Kirsten Kilgore PA-C   clotrimazole-betamethasone (Lotrisone) cream Apply 1 Application topically 2 times a day.  Patient not taking: Reported on 12/18/2024 10/3/24 1/31/25  NAN [place]   EPINEPHrine 0.3 mg/0.3 mL injection syringe Inject 0.3 mL (0.3 mg) into the muscle 1 time if needed for anaphylaxis for up to 1 dose. As Directed 2/2/24   NAN [place]   fluoride, sodium, (PreviDent 5000 Dry Mouth) 1.1 % dental paste Apply to teeth.  Patient not taking: Reported on 12/18/2024 10/30/17   Historical Provider, MD   nystatin (Mycostatin) 100,000 unit/gram powder Apply 1 Application topically 2 times a day.  Patient not taking: Reported on 12/18/2024 10/3/24 10/3/25  Delores Dailey, APRN-CNP        PAT ROS     DASI Risk Score    No data to display       Caprini DVT Assessment    No data to display        Modified Frailty Index    No data to display       CHADS2 Stroke Risk  Current as of 5 hours ago        N/A 3 to 100%: High Risk   2 to < 3%: Medium Risk   0 to < 2%: Low Risk     Last Change: N/A          This score determines the patient's risk of having a stroke if the patient has atrial fibrillation.        This score is not applicable to this patient. Components are not calculated.          Revised Cardiac Risk Index    No data to display       Apfel Simplified Score    No data to display       Risk Analysis Index Results This Encounter    No data found in the last 10 encounters.       Prodigy: High Risk  Total Score: 11              Prodigy Age Score      Prodigy Previous Opioid Use Score           ARISCAT Score for Postoperative Pulmonary Complications    No data to display       Aguilar Perioperative Risk for Myocardial Infarction or Cardiac Arrest (CJ)    No data to display         Nurse Plan of Action:     After Visit Summary (AVS) reviewed and patient verbalized good understanding of medications and NPO instructions.  Pre-op infection prevention measures:  CHG showers and mouthwash reviewed, understanding voiced.  CHG soap given and patient verbalized need to pick CHG mouthwash at their preferred local pharmacy.

## 2024-12-20 ENCOUNTER — OFFICE VISIT (OUTPATIENT)
Dept: PRIMARY CARE | Facility: CLINIC | Age: 64
End: 2024-12-20
Payer: MEDICAID

## 2024-12-20 VITALS
SYSTOLIC BLOOD PRESSURE: 116 MMHG | RESPIRATION RATE: 16 BRPM | OXYGEN SATURATION: 97 % | TEMPERATURE: 97.7 F | HEART RATE: 96 BPM | DIASTOLIC BLOOD PRESSURE: 81 MMHG

## 2024-12-20 DIAGNOSIS — G47.00 INSOMNIA, UNSPECIFIED TYPE: ICD-10-CM

## 2024-12-20 DIAGNOSIS — M50.30 DDD (DEGENERATIVE DISC DISEASE), CERVICAL: ICD-10-CM

## 2024-12-20 LAB — STAPHYLOCOCCUS SPEC CULT: NORMAL

## 2024-12-20 PROCEDURE — 99213 OFFICE O/P EST LOW 20 MIN: CPT | Performed by: NURSE PRACTITIONER

## 2024-12-20 PROCEDURE — 3074F SYST BP LT 130 MM HG: CPT | Performed by: NURSE PRACTITIONER

## 2024-12-20 PROCEDURE — 3079F DIAST BP 80-89 MM HG: CPT | Performed by: NURSE PRACTITIONER

## 2024-12-20 RX ORDER — TRAMADOL HYDROCHLORIDE 50 MG/1
50 TABLET ORAL EVERY 4 HOURS PRN
Qty: 150 TABLET | Refills: 0 | Status: SHIPPED | OUTPATIENT
Start: 2024-12-20 | End: 2025-01-19

## 2024-12-20 RX ORDER — PREGABALIN 100 MG/1
100 CAPSULE ORAL 3 TIMES DAILY
Qty: 270 CAPSULE | Refills: 0 | Status: SHIPPED | OUTPATIENT
Start: 2024-12-20 | End: 2025-03-20

## 2024-12-20 RX ORDER — TRAZODONE HYDROCHLORIDE 100 MG/1
300 TABLET ORAL NIGHTLY PRN
Qty: 270 TABLET | Refills: 2 | Status: SHIPPED | OUTPATIENT
Start: 2024-12-20

## 2024-12-20 SDOH — ECONOMIC STABILITY: FOOD INSECURITY: WITHIN THE PAST 12 MONTHS, YOU WORRIED THAT YOUR FOOD WOULD RUN OUT BEFORE YOU GOT MONEY TO BUY MORE.: SOMETIMES TRUE

## 2024-12-20 SDOH — ECONOMIC STABILITY: FOOD INSECURITY: WITHIN THE PAST 12 MONTHS, THE FOOD YOU BOUGHT JUST DIDN'T LAST AND YOU DIDN'T HAVE MONEY TO GET MORE.: SOMETIMES TRUE

## 2024-12-20 ASSESSMENT — PATIENT HEALTH QUESTIONNAIRE - PHQ9
1. LITTLE INTEREST OR PLEASURE IN DOING THINGS: NOT AT ALL
2. FEELING DOWN, DEPRESSED OR HOPELESS: NOT AT ALL
SUM OF ALL RESPONSES TO PHQ9 QUESTIONS 1 AND 2: 0

## 2024-12-20 ASSESSMENT — ENCOUNTER SYMPTOMS
DEPRESSION: 0
LOSS OF SENSATION IN FEET: 0
OCCASIONAL FEELINGS OF UNSTEADINESS: 0

## 2024-12-20 ASSESSMENT — LIFESTYLE VARIABLES: HOW MANY STANDARD DRINKS CONTAINING ALCOHOL DO YOU HAVE ON A TYPICAL DAY: PATIENT DOES NOT DRINK

## 2024-12-20 ASSESSMENT — PAIN SCALES - GENERAL: PAINLEVEL_OUTOF10: 5

## 2024-12-20 NOTE — PROGRESS NOTES
Subjective   Cynthia Cantrell is a 64 y.o. female who presents for Follow-up.  HPI  Just had mediport for orencia infusions this week  Doing okay with that    Pain still pretty strong due to her RA  She is due for her yearly physical    Also needs refill on Ultram  Trazadone and Lyrica  Looking forward to time with her sons, one from [place] will be visiting. Hoping the pain is improved.      All systems reviewed. Review of systems negative except for noted positives in HPI    Objective     /81   Pulse 96   Temp 36.5 °C (97.7 °F) (Temporal)   Resp 16   LMP  (LMP Unknown)   SpO2 97%    Vital signs noted and reviewed.       Physical Exam  Vitals and nursing note reviewed.   Constitutional:       Appearance: Normal appearance.   HENT:      Head: Normocephalic and atraumatic.      Right Ear: Tympanic membrane normal.      Left Ear: Tympanic membrane normal.      Nose: Nose normal.      Mouth/Throat:      Mouth: Mucous membranes are moist.   Eyes:      Extraocular Movements: Extraocular movements intact.      Pupils: Pupils are equal, round, and reactive to light.   Cardiovascular:      Rate and Rhythm: Normal rate and regular rhythm.      Pulses: Normal pulses.      Heart sounds: Normal heart sounds.   Pulmonary:      Effort: Pulmonary effort is normal.      Breath sounds: Normal breath sounds.   Abdominal:      General: Bowel sounds are normal.      Palpations: Abdomen is soft.   Musculoskeletal:         General: Normal range of motion.      Cervical back: Normal range of motion and neck supple.   Skin:     General: Skin is warm.      Capillary Refill: Capillary refill takes less than 2 seconds.   Neurological:      Mental Status: She is alert and oriented to person, place, and time.   Psychiatric:         Mood and Affect: Mood normal.             Assessment/Plan   Problem List Items Addressed This Visit          Sleep    Insomnia    Relevant Medications    traZODone (Desyrel) 100 mg tablet     Other  Visit Diagnoses       DDD (degenerative disc disease), cervical        Relevant Medications    traMADol (Ultram) 50 mg tablet    pregabalin (Lyrica) 100 mg capsule                  [employer] reviewed  Will be due for contract starting Jan 2025  Meds renewed as discussed    Needs yearly physical

## 2025-01-06 ENCOUNTER — ANESTHESIA EVENT (OUTPATIENT)
Dept: OPERATING ROOM | Facility: HOSPITAL | Age: 65
End: 2025-01-06
Payer: MEDICAID

## 2025-01-07 ENCOUNTER — APPOINTMENT (OUTPATIENT)
Dept: RADIOLOGY | Facility: HOSPITAL | Age: 65
End: 2025-01-07
Payer: MEDICAID

## 2025-01-07 ENCOUNTER — ANESTHESIA (OUTPATIENT)
Dept: OPERATING ROOM | Facility: HOSPITAL | Age: 65
End: 2025-01-07
Payer: MEDICAID

## 2025-01-07 ENCOUNTER — HOSPITAL ENCOUNTER (OUTPATIENT)
Facility: HOSPITAL | Age: 65
Setting detail: OUTPATIENT SURGERY
Discharge: HOME | End: 2025-01-07
Attending: SURGERY | Admitting: SURGERY
Payer: MEDICAID

## 2025-01-07 VITALS
WEIGHT: 145.06 LBS | TEMPERATURE: 97.2 F | HEART RATE: 70 BPM | DIASTOLIC BLOOD PRESSURE: 55 MMHG | HEIGHT: 63 IN | RESPIRATION RATE: 20 BRPM | OXYGEN SATURATION: 100 % | BODY MASS INDEX: 25.7 KG/M2 | SYSTOLIC BLOOD PRESSURE: 97 MMHG

## 2025-01-07 DIAGNOSIS — M05.79 RHEUMATOID ARTHRITIS INVOLVING MULTIPLE SITES WITH POSITIVE RHEUMATOID FACTOR (MULTI): Primary | ICD-10-CM

## 2025-01-07 DIAGNOSIS — Z79.899 OTHER LONG TERM (CURRENT) DRUG THERAPY: ICD-10-CM

## 2025-01-07 PROCEDURE — 3700000002 HC GENERAL ANESTHESIA TIME - EACH INCREMENTAL 1 MINUTE: Performed by: SURGERY

## 2025-01-07 PROCEDURE — 7100000001 HC RECOVERY ROOM TIME - INITIAL BASE CHARGE: Performed by: SURGERY

## 2025-01-07 PROCEDURE — 7100000009 HC PHASE TWO TIME - INITIAL BASE CHARGE: Performed by: SURGERY

## 2025-01-07 PROCEDURE — 76000 FLUOROSCOPY <1 HR PHYS/QHP: CPT

## 2025-01-07 PROCEDURE — 2500000001 HC RX 250 WO HCPCS SELF ADMINISTERED DRUGS (ALT 637 FOR MEDICARE OP): Performed by: ANESTHESIOLOGY

## 2025-01-07 PROCEDURE — 71045 X-RAY EXAM CHEST 1 VIEW: CPT

## 2025-01-07 PROCEDURE — A36571 PR INSERT PICC W/ SUB-Q PORT: Performed by: ANESTHESIOLOGY

## 2025-01-07 PROCEDURE — C1788 PORT, INDWELLING, IMP: HCPCS | Performed by: SURGERY

## 2025-01-07 PROCEDURE — 3700000001 HC GENERAL ANESTHESIA TIME - INITIAL BASE CHARGE: Performed by: SURGERY

## 2025-01-07 PROCEDURE — 2720000007 HC OR 272 NO HCPCS: Performed by: SURGERY

## 2025-01-07 PROCEDURE — 2780000003 HC OR 278 NO HCPCS: Performed by: SURGERY

## 2025-01-07 PROCEDURE — 2500000004 HC RX 250 GENERAL PHARMACY W/ HCPCS (ALT 636 FOR OP/ED): Performed by: SURGERY

## 2025-01-07 PROCEDURE — 3600000007 HC OR TIME - EACH INCREMENTAL 1 MINUTE - PROCEDURE LEVEL TWO: Performed by: SURGERY

## 2025-01-07 PROCEDURE — 7100000010 HC PHASE TWO TIME - EACH INCREMENTAL 1 MINUTE: Performed by: SURGERY

## 2025-01-07 PROCEDURE — 71045 X-RAY EXAM CHEST 1 VIEW: CPT | Performed by: RADIOLOGY

## 2025-01-07 PROCEDURE — 2500000005 HC RX 250 GENERAL PHARMACY W/O HCPCS: Performed by: SURGERY

## 2025-01-07 PROCEDURE — 2500000004 HC RX 250 GENERAL PHARMACY W/ HCPCS (ALT 636 FOR OP/ED): Performed by: ANESTHESIOLOGY

## 2025-01-07 PROCEDURE — 36571 INSERT PICVAD CATH: CPT | Performed by: SURGERY

## 2025-01-07 PROCEDURE — 7100000002 HC RECOVERY ROOM TIME - EACH INCREMENTAL 1 MINUTE: Performed by: SURGERY

## 2025-01-07 PROCEDURE — 3600000002 HC OR TIME - INITIAL BASE CHARGE - PROCEDURE LEVEL TWO: Performed by: SURGERY

## 2025-01-07 DEVICE — IMPLANTABLE DEVICE: Type: IMPLANTABLE DEVICE | Site: CHEST  WALL | Status: FUNCTIONAL

## 2025-01-07 RX ORDER — LIDOCAINE HYDROCHLORIDE 20 MG/ML
INJECTION, SOLUTION EPIDURAL; INFILTRATION; INTRACAUDAL; PERINEURAL AS NEEDED
Status: DISCONTINUED | OUTPATIENT
Start: 2025-01-07 | End: 2025-01-07

## 2025-01-07 RX ORDER — PROPOFOL 10 MG/ML
INJECTION, EMULSION INTRAVENOUS AS NEEDED
Status: DISCONTINUED | OUTPATIENT
Start: 2025-01-07 | End: 2025-01-07

## 2025-01-07 RX ORDER — MEPERIDINE HYDROCHLORIDE 25 MG/ML
12.5 INJECTION INTRAMUSCULAR; INTRAVENOUS; SUBCUTANEOUS EVERY 10 MIN PRN
Status: DISCONTINUED | OUTPATIENT
Start: 2025-01-07 | End: 2025-01-07 | Stop reason: HOSPADM

## 2025-01-07 RX ORDER — SODIUM CHLORIDE 0.9 G/100ML
IRRIGANT IRRIGATION AS NEEDED
Status: DISCONTINUED | OUTPATIENT
Start: 2025-01-07 | End: 2025-01-07 | Stop reason: HOSPADM

## 2025-01-07 RX ORDER — ONDANSETRON HYDROCHLORIDE 2 MG/ML
4 INJECTION, SOLUTION INTRAVENOUS ONCE AS NEEDED
Status: DISCONTINUED | OUTPATIENT
Start: 2025-01-07 | End: 2025-01-07 | Stop reason: HOSPADM

## 2025-01-07 RX ORDER — OXYCODONE AND ACETAMINOPHEN 5; 325 MG/1; MG/1
1 TABLET ORAL EVERY 6 HOURS PRN
Qty: 5 TABLET | Refills: 0 | Status: SHIPPED | OUTPATIENT
Start: 2025-01-07

## 2025-01-07 RX ORDER — DIPHENHYDRAMINE HCL 25 MG
25 CAPSULE ORAL ONCE
Status: COMPLETED | OUTPATIENT
Start: 2025-01-07 | End: 2025-01-07

## 2025-01-07 RX ORDER — FENTANYL CITRATE 50 UG/ML
INJECTION, SOLUTION INTRAMUSCULAR; INTRAVENOUS AS NEEDED
Status: DISCONTINUED | OUTPATIENT
Start: 2025-01-07 | End: 2025-01-07

## 2025-01-07 RX ORDER — MIDAZOLAM HYDROCHLORIDE 1 MG/ML
INJECTION INTRAMUSCULAR; INTRAVENOUS AS NEEDED
Status: DISCONTINUED | OUTPATIENT
Start: 2025-01-07 | End: 2025-01-07

## 2025-01-07 RX ORDER — OXYCODONE HYDROCHLORIDE 5 MG/1
5 TABLET ORAL EVERY 4 HOURS PRN
Status: DISCONTINUED | OUTPATIENT
Start: 2025-01-07 | End: 2025-01-07 | Stop reason: HOSPADM

## 2025-01-07 RX ORDER — LIDOCAINE HYDROCHLORIDE 10 MG/ML
0.1 INJECTION, SOLUTION EPIDURAL; INFILTRATION; INTRACAUDAL; PERINEURAL ONCE
Status: DISCONTINUED | OUTPATIENT
Start: 2025-01-07 | End: 2025-01-07 | Stop reason: HOSPADM

## 2025-01-07 RX ORDER — CLINDAMYCIN PHOSPHATE 900 MG/50ML
INJECTION, SOLUTION INTRAVENOUS AS NEEDED
Status: DISCONTINUED | OUTPATIENT
Start: 2025-01-07 | End: 2025-01-07

## 2025-01-07 RX ADMIN — FENTANYL CITRATE 25 MCG: 50 INJECTION, SOLUTION INTRAMUSCULAR; INTRAVENOUS at 16:20

## 2025-01-07 RX ADMIN — MIDAZOLAM HYDROCHLORIDE 2 MG: 1 INJECTION, SOLUTION INTRAMUSCULAR; INTRAVENOUS at 16:00

## 2025-01-07 RX ADMIN — DIPHENHYDRAMINE HYDROCHLORIDE 25 MG: 25 CAPSULE ORAL at 17:44

## 2025-01-07 RX ADMIN — SODIUM CHLORIDE: 9 INJECTION, SOLUTION INTRAVENOUS at 16:00

## 2025-01-07 RX ADMIN — LIDOCAINE HYDROCHLORIDE 60 MG: 20 INJECTION, SOLUTION EPIDURAL; INFILTRATION; INTRACAUDAL; PERINEURAL at 16:04

## 2025-01-07 RX ADMIN — FENTANYL CITRATE 25 MCG: 50 INJECTION, SOLUTION INTRAMUSCULAR; INTRAVENOUS at 16:39

## 2025-01-07 RX ADMIN — PROPOFOL 150 MCG/KG/MIN: 10 INJECTION, EMULSION INTRAVENOUS at 16:05

## 2025-01-07 RX ADMIN — FENTANYL CITRATE 50 MCG: 50 INJECTION, SOLUTION INTRAMUSCULAR; INTRAVENOUS at 16:18

## 2025-01-07 RX ADMIN — PROPOFOL 60 MG: 10 INJECTION, EMULSION INTRAVENOUS at 16:04

## 2025-01-07 RX ADMIN — CLINDAMYCIN PHOSPHATE 900 MG: 900 INJECTION, SOLUTION INTRAVENOUS at 16:11

## 2025-01-07 ASSESSMENT — PAIN - FUNCTIONAL ASSESSMENT
PAIN_FUNCTIONAL_ASSESSMENT: 0-10
PAIN_FUNCTIONAL_ASSESSMENT: UNABLE TO SELF-REPORT
PAIN_FUNCTIONAL_ASSESSMENT: 0-10

## 2025-01-07 ASSESSMENT — PAIN SCALES - GENERAL
PAINLEVEL_OUTOF10: 6
PAINLEVEL_OUTOF10: 1

## 2025-01-07 ASSESSMENT — COLUMBIA-SUICIDE SEVERITY RATING SCALE - C-SSRS
1. IN THE PAST MONTH, HAVE YOU WISHED YOU WERE DEAD OR WISHED YOU COULD GO TO SLEEP AND NOT WAKE UP?: NO
6. HAVE YOU EVER DONE ANYTHING, STARTED TO DO ANYTHING, OR PREPARED TO DO ANYTHING TO END YOUR LIFE?: NO
2. HAVE YOU ACTUALLY HAD ANY THOUGHTS OF KILLING YOURSELF?: NO

## 2025-01-07 NOTE — OP NOTE
Subcutaneous Port Line Placement Operative Note     Date: 2025  OR Location: [facility]    Name: Cynthia Cantrell, : 1960, Age: 64 y.o., MRN: [id number], Sex: female    Diagnosis  Pre-op Diagnosis      * Rheumatoid arthritis involving multiple sites with positive rheumatoid factor (Multi) [M05.79] Post-op Diagnosis     * Rheumatoid arthritis involving multiple sites with positive rheumatoid factor (Multi) [M05.79]     Procedures  Subcutaneous Port Line Placement  [address] - [place] [facility] V[facility] W/SUBQ PORT AGE 5 YR/>      Surgeons      * Ady Elise - Primary    Resident/Fellow/Other Assistant:  Surgeons and Role:  * No surgeons found with a matching role *    Staff:   Surgical Assistant:   Keyshawn: Nancy Moralez Person: Maurilio Moralez Person: Serafin    Anesthesia Staff: Anesthesiologist: Stu Morin MD; Maury Kim MD  CRNA: MEERA Wiseman-EMANUEL    Procedure Summary  Anesthesia: Anesthesia type not filed in the log.  ASA: III  Estimated Blood Loss: 3 mL  Intra-op Medications:   Administrations occurring from 1600 to 1700 on 25:   Medication Name Total Dose   sodium chloride 0.9 % irrigation solution 1,000 mL   BUPivacaine-EPINEPHrine (Marcaine w/EPI) 20 mL, lidocaine-epinephrine (Xylocaine W/EPI) 1 %-1:100,000 20 mL syringe 10 mL   clindamycin (Cleocin)  mg in 50 mL D5W - premix 900 mg   fentaNYL (Sublimaze) injection 50 mcg/mL 100 mcg   lidocaine PF (Xylocaine-MPF) local injection 2 % 60 mg   midazolam PF (Versed) injection 1 mg/mL 2 mg   propofol (Diprivan) injection 10 mg/mL 359.39 mg   NaCl 0.9 % bolus Cannot be calculated              Anesthesia Record               Intraprocedure I/O Totals          Intake    NaCl 0.9 % bolus 500.00 mL    Total Intake 500 mL          Specimen: No specimens collected           Indications: Cynthia Cantrell is an 64 y.o. female who is having surgery for Rheumatoid arthritis involving multiple sites with positive rheumatoid factor  (Multi) [M05.79].     The patient was seen in the preoperative area. The risks, benefits, complications, treatment options, non-operative alternatives, expected recovery and outcomes were discussed with the patient. The possibilities of reaction to medication, pulmonary aspiration, injury to surrounding structures, bleeding, recurrent infection, the need for additional procedures, failure to diagnose a condition, and creating a complication requiring transfusion or operation were discussed with the patient. The patient concurred with the proposed plan, giving informed consent.  The site of surgery was properly noted/marked if necessary per policy. The patient has been actively warmed in preoperative area. Preoperative antibiotics have been ordered and given within 1 hours of incision. Venous thrombosis prophylaxis have been ordered including bilateral sequential compression devices     Procedure Details: Patient is a 64-year-old female with history of rheumatoid arthritis, Sjogren's syndrome, chronic pain, spinal stenosis.  She comes in for placement of a Mediport catheter for administration of Orencia.  She gets  infusions on a monthly basis.  Lately there has been difficulty with accessing her veins.   Risks, benefits, and alternatives were discussed preoperatively, and she agrees to the operation.       DESCRIPTION OF PROCEDURE:     Patient taken the operating room, placed supine on the operating table.  Timeout was established, IV analgesia was induced.  She did receive antibiotics.  The neck and chest were prepped and draped in the sterile fashion.    She was placed in Trendelenburg position.  We infiltrated beneath the left clavicle with a solution 1% lidocaine and quarter percent Marcaine.    We used a angio needle to easily cannulate the left subclavian vein.  We then passed a metal guidewire, and it's  good placement was confirmed under fluoroscopy.    We infiltrated this area and then made an incision and  then fashion a pocket to receive our port.  We selected an 8 [place] Bard MRI compatible Mediport and secured this to the chest wall using 3-0 Prolene sutures.  We cut the Silastic tubing to length.  We then passed a dilator peel-away sheath over the metal guidewire under fluoroscopic visualization.  We removed the dilator with the wire and then passed the Silastic tubing down the peel-away sheath.  The sheath was peeled back.  Fluoroscopy revealed our line to be in good position.  Chest x-ray pending at the time of this dictation.    The line was noted to aspirate and flushed freely.  We loaded the port with heparin concentration of 500 units/mL.  The skin was closed using 3 oh subcu dermal interrupted Vicryl stitches followed by Dermabond glue.  Patient tolerated the procedure without difficulty and was returned to recovery room in stable condition       Complications:  None; patient tolerated the procedure well.    Disposition: PACU - hemodynamically stable.  Condition: stable           Attending Attestation: I was present and scrubbed for the entire procedure.    Ady Elise  Phone Number: [phone]

## 2025-01-07 NOTE — ANESTHESIA PREPROCEDURE EVALUATION
Patient: Cynthia Cantrell    Procedure Information       Date/Time: 01/07/25 1500    Procedure: Subcutaneous Port Line Placement    Location: [facility] 09 / [facility]    Surgeons: Ady Elise MD          History Comments   Other overlap syndromes Mixed connective tissue disease   GERD (gastroesophageal reflux disease) under control   Old myocardial infarction Old myocardial infarct   Sjogren syndrome, unspecified (Multi)    Coronary artery disease    Hypertension    Hyperlipidemia    Adverse effect of anesthesia anaphylactic shock with appendectomy   Fibromyalgia    Status post insertion of spinal cord stimulator    Rheumatoid arthritis      History Comments   APPENDECTOMY Appendectomy   CT ABDOMEN PELVIS ANGIOGRAM W AND/OR WO IV CONTRAST CT ABDOMEN PELVIS ANGIOGRAM W AND/OR WO IV CONTRAST 4/11/2019 [facility] CLINICAL LEGACY   OTHER SURGICAL HISTORY removal vocal cord polyps   ORIF ANKLE FRACTURE    FOOT SURGERY shace bone and internal hardware   MIDDLE EAR SURGERY mastoidectomy / tympanoplasty   LUMBAR LAMINECTOMY    SPINAL CORD STIMULATOR IMPLANT        Relevant Problems   Cardiac   (+) Chest pain   (+) Coronary arteriosclerosis in native artery   (+) Essential hypertension   (+) Hyperlipidemia   (+) Hypertension   (+) Mixed hyperlipidemia   (+) Myocardial infarct (Multi)   (+) NSTEMI (non-ST elevated myocardial infarction) (Multi)      Neuro   (+) Cervical radiculitis   (+) Lumbar radiculopathy   (+) Lumbosacral neuritis      GI   (+) GERD (gastroesophageal reflux disease)      Liver   (+) Familial porphyria cutanea tarda (Multi)      Musculoskeletal   (+) Chronic pain syndrome   (+) Fibromyalgia   (+) Lumbar spondylosis   (+) Lumbar stenosis with neurogenic claudication   (+) Lumbosacral stenosis   (+) Rheumatoid arthritis      HEENT   (+) Conductive hearing loss of right ear with unrestricted hearing of left ear   (+) Seasonal allergies      ID   (+) Plantar wart of right foot      Skin   (+) Rash  and other nonspecific skin eruption       Clinical information reviewed:    Allergies  Meds               NPO Detail:  No data recorded     Physical Exam    Airway  Mallampati: II  TM distance: >3 FB  Neck ROM: full     Cardiovascular   Rhythm: regular  Rate: normal     Dental    Pulmonary   Breath sounds clear to auscultation     Abdominal            Anesthesia Plan    History of general anesthesia?: yes  History of complications of general anesthesia?: no    ASA 3     MAC     intravenous induction   Anesthetic plan and risks discussed with patient.    Plan discussed with CRNA.

## 2025-01-07 NOTE — ANESTHESIA POSTPROCEDURE EVALUATION
Patient: Cynthia Cantrell    Procedure Summary       Date: 01/07/25 Room / Location: [facility] 03 / [facility]    Anesthesia Start: 1600 Anesthesia Stop: 1700    Procedure: Subcutaneous Port Line Placement Diagnosis:       Rheumatoid arthritis involving multiple sites with positive rheumatoid factor (Multi)      (Rheumatoid arthritis involving multiple sites with positive rheumatoid factor (Multi) [M05.79])    Surgeons: Ady Elise MD Responsible Provider: Maury Kim MD    Anesthesia Type: MAC ASA Status: 3            Anesthesia Type: MAC    Vitals Value Taken Time   /79 01/07/25 1701   Temp 36.1 01/07/25 1703   Pulse 71 01/07/25 1703   Resp 17 01/07/25 1703   SpO2 100 % 01/07/25 1703   Vitals shown include unfiled device data.    Anesthesia Post Evaluation    Patient location during evaluation: PACU  Patient participation: complete - patient participated  Level of consciousness: awake  Pain management: adequate  Airway patency: patent  Cardiovascular status: acceptable  Respiratory status: acceptable  Hydration status: acceptable  Postoperative Nausea and Vomiting: none      No notable events documented.

## 2025-01-08 ASSESSMENT — PAIN SCALES - GENERAL: PAINLEVEL_OUTOF10: 3

## 2025-01-24 ENCOUNTER — OFFICE VISIT (OUTPATIENT)
Dept: PRIMARY CARE | Facility: CLINIC | Age: 65
End: 2025-01-24
Payer: MEDICAID

## 2025-01-24 ENCOUNTER — OFFICE VISIT (OUTPATIENT)
Dept: SURGERY | Facility: CLINIC | Age: 65
End: 2025-01-24
Payer: MEDICAID

## 2025-01-24 VITALS
BODY MASS INDEX: 25.69 KG/M2 | SYSTOLIC BLOOD PRESSURE: 113 MMHG | HEART RATE: 93 BPM | DIASTOLIC BLOOD PRESSURE: 81 MMHG | WEIGHT: 145 LBS

## 2025-01-24 VITALS
HEART RATE: 93 BPM | RESPIRATION RATE: 16 BRPM | OXYGEN SATURATION: 94 % | DIASTOLIC BLOOD PRESSURE: 73 MMHG | SYSTOLIC BLOOD PRESSURE: 104 MMHG | TEMPERATURE: 96.1 F

## 2025-01-24 DIAGNOSIS — N93.9 VAGINAL BLEEDING: ICD-10-CM

## 2025-01-24 DIAGNOSIS — Z09 POSTOPERATIVE EXAMINATION: Primary | ICD-10-CM

## 2025-01-24 DIAGNOSIS — M50.30 DDD (DEGENERATIVE DISC DISEASE), CERVICAL: ICD-10-CM

## 2025-01-24 DIAGNOSIS — M54.12 CERVICAL RADICULOPATHY: ICD-10-CM

## 2025-01-24 DIAGNOSIS — M05.79 RHEUMATOID ARTHRITIS INVOLVING MULTIPLE SITES WITH POSITIVE RHEUMATOID FACTOR (MULTI): Primary | ICD-10-CM

## 2025-01-24 DIAGNOSIS — Z59.41 FOOD INSECURITY: ICD-10-CM

## 2025-01-24 DIAGNOSIS — H92.11 OTORRHEA OF RIGHT EAR: ICD-10-CM

## 2025-01-24 DIAGNOSIS — Z88.9 MULTIPLE ALLERGIES: ICD-10-CM

## 2025-01-24 PROCEDURE — 3078F DIAST BP <80 MM HG: CPT | Performed by: NURSE PRACTITIONER

## 2025-01-24 PROCEDURE — 99214 OFFICE O/P EST MOD 30 MIN: CPT | Performed by: NURSE PRACTITIONER

## 2025-01-24 PROCEDURE — 3074F SYST BP LT 130 MM HG: CPT | Performed by: SURGERY

## 2025-01-24 PROCEDURE — 3074F SYST BP LT 130 MM HG: CPT | Performed by: NURSE PRACTITIONER

## 2025-01-24 PROCEDURE — 1036F TOBACCO NON-USER: CPT | Performed by: NURSE PRACTITIONER

## 2025-01-24 PROCEDURE — 99211 OFF/OP EST MAY X REQ PHY/QHP: CPT | Performed by: SURGERY

## 2025-01-24 PROCEDURE — 1036F TOBACCO NON-USER: CPT | Performed by: SURGERY

## 2025-01-24 PROCEDURE — 3079F DIAST BP 80-89 MM HG: CPT | Performed by: SURGERY

## 2025-01-24 RX ORDER — TRAMADOL HYDROCHLORIDE 50 MG/1
50 TABLET ORAL EVERY 4 HOURS PRN
Qty: 150 TABLET | Refills: 0 | Status: SHIPPED | OUTPATIENT
Start: 2025-01-24 | End: 2025-02-23

## 2025-01-24 SDOH — ECONOMIC STABILITY: FOOD INSECURITY: WITHIN THE PAST 12 MONTHS, YOU WORRIED THAT YOUR FOOD WOULD RUN OUT BEFORE YOU GOT MONEY TO BUY MORE.: SOMETIMES TRUE

## 2025-01-24 SDOH — ECONOMIC STABILITY - FOOD INSECURITY: FOOD INSECURITY: Z59.41

## 2025-01-24 SDOH — ECONOMIC STABILITY: FOOD INSECURITY: WITHIN THE PAST 12 MONTHS, THE FOOD YOU BOUGHT JUST DIDN'T LAST AND YOU DIDN'T HAVE MONEY TO GET MORE.: SOMETIMES TRUE

## 2025-01-24 ASSESSMENT — ENCOUNTER SYMPTOMS
LOSS OF SENSATION IN FEET: 0
LOSS OF SENSATION IN FEET: 0
DEPRESSION: 0
DEPRESSION: 0
OCCASIONAL FEELINGS OF UNSTEADINESS: 0
OCCASIONAL FEELINGS OF UNSTEADINESS: 0

## 2025-01-24 ASSESSMENT — LIFESTYLE VARIABLES: HOW MANY STANDARD DRINKS CONTAINING ALCOHOL DO YOU HAVE ON A TYPICAL DAY: PATIENT DOES NOT DRINK

## 2025-01-24 ASSESSMENT — PAIN SCALES - GENERAL
PAINLEVEL_OUTOF10: 0-NO PAIN
PAINLEVEL_OUTOF10: 0-NO PAIN

## 2025-01-24 ASSESSMENT — PATIENT HEALTH QUESTIONNAIRE - PHQ9
SUM OF ALL RESPONSES TO PHQ9 QUESTIONS 1 AND 2: 0
2. FEELING DOWN, DEPRESSED OR HOPELESS: NOT AT ALL
1. LITTLE INTEREST OR PLEASURE IN DOING THINGS: NOT AT ALL

## 2025-01-24 NOTE — PROGRESS NOTES
Mrs. Cynthia Cantrell is a 64 y.o. year old female who comes in today for follow-up after undergoing placement of the left subclavian Mediport catheter.  This procedure was performed on 1/7/2025 for an indication of medications to treat rheumatoid arthritis.    Patient is doing very well and is pleased with the outcome of the surgery.  She is scheduled to have the port accessed for the first time this coming Monday    Tolerating a regular diet, bowel movements are normal    On exam patient looks well    Incisions are healing very nicely    Impression: Doing well following placement of a left subclavian Mediport    Port is okay to use    Discussed increasing activity level    Follow-up with me as needed   Subjective   Patient ID: Cynthia Cantrell is a 64 y.o. female who presents for Post-op.  HPI    Review of Systems    Objective   Physical Exam    Assessment/Plan            Ady Elise MD 01/24/25 11:10 AM

## 2025-01-24 NOTE — PROGRESS NOTES
Subjective   Cynthia Cantrell is a 64 y.o. female who presents for Annual Exam (physical).  HPI  RIGHT EAR PAIN, DRAINAGE  FOR LONG TIME, hx of complex surgery in the past   Does not usually put things in eye    NEEDS APPT FOR PAIN MANAGEMENT/ PAIN RIGHT THIGH? Ongoing for a while. Significant RA and DDD/DJD disease    Needs stander and also electric wheelchair.  Has been more and more dependent on her wheelchair and more limited mobility  Has had same for a  long time.     HAS PAIN IN RIGHT  INDEX FINGER thumb and middle  ONGOING FOR A WHILE   + some cervical pain. Hx of severe DDD DJD had Dr Raymundo do posterior back surgery. Has also been in pain management for inections. Would like to avoid any type of procedure but if needed, will agree.  No recent trauma falls  + some paraspinal cervical tenderness    Continues to have vaginal bleeding  And low abd cramps  Prior uterine biopsy in office inconclusive  Will make another referral    Wonders about vaccinations karime since she is higher risk being on biologics. Multiple allergies. Had significant reaction to diprovan during recent port placements.    Will refer to allergist     All systems reviewed. Review of systems negative except for noted positives in HPI    Objective     /73   Pulse 93   Temp 35.6 °C (96.1 °F) (Temporal)   Resp 16   LMP  (LMP Unknown)   SpO2 94%    Vital signs noted and reviewed.       Physical Exam  Vitals and nursing note reviewed.   Constitutional:       Appearance: Normal appearance.   HENT:      Head: Normocephalic and atraumatic.      Right Ear: Tympanic membrane normal.      Left Ear: Tympanic membrane normal.      Nose: Nose normal.      Mouth/Throat:      Mouth: Mucous membranes are dry.   Eyes:      Extraocular Movements: Extraocular movements intact.      Pupils: Pupils are equal, round, and reactive to light.   Cardiovascular:      Rate and Rhythm: Normal rate and regular rhythm.      Pulses: Normal pulses.       Heart sounds: Normal heart sounds.   Pulmonary:      Effort: Pulmonary effort is normal.      Breath sounds: Normal breath sounds.   Abdominal:      General: Abdomen is flat.      Palpations: Abdomen is soft.   Musculoskeletal:         General: Normal range of motion.      Cervical back: Normal range of motion and neck supple.   Skin:     General: Skin is warm and dry.      Capillary Refill: Capillary refill takes less than 2 seconds.   Neurological:      General: No focal deficit present.      Mental Status: She is alert and oriented to person, place, and time.   Psychiatric:         Mood and Affect: Mood normal.         Behavior: Behavior normal.             Assessment/Plan   Problem List Items Addressed This Visit          Multi-system (Lupus, Sarcoid)    Rheumatoid arthritis - Primary    Relevant Orders    Miscellaneous DME    Wheelchair Motorized W/ Fixed Full Length Arms Leg Rests     Other Visit Diagnoses       DDD (degenerative disc disease), cervical        Relevant Medications    traMADol (Ultram) 50 mg tablet    Other Relevant Orders    Miscellaneous DME    Wheelchair Motorized W/ Fixed Full Length Arms Leg Rests    Vaginal bleeding        Relevant Orders    Referral to Obstetrics / Gynecology    Multiple allergies        Relevant Orders    Referral to Allergy    Otorrhea of right ear        Relevant Orders    Referral to ENT    Cervical radiculopathy        Relevant Orders    CT cervical spine wo IV contrast    Food insecurity        Relevant Orders    Referral to [employer] [facility] [employer]                See back in a few weeks or sooner  [place] reviewed

## 2025-01-24 NOTE — LETTER
January 24, 2025     Antonietta Candelario, APRN-CNP  [address] [address]    Patient: Cynthia Cantrell   YOB: 1960   Date of Visit: 1/24/2025       Dear Dr. Antonietta Candelario, APRN-CNP:    Thank you for referring Cynthia Cantrell to me for evaluation. Below are my notes for this consultation.  If you have questions, please do not hesitate to call me. I look forward to following your patient along with you.       Sincerely,     Ady Elise MD      CC: No Recipients  ______________________________________________________________________________________       Mrs. Cynthia Cantrell is a 64 y.o. year old female who comes in today for follow-up after undergoing placement of the left subclavian Mediport catheter.  This procedure was performed on 1/7/2025 for an indication of medications to treat rheumatoid arthritis.    Patient is doing very well and is pleased with the outcome of the surgery.  She is scheduled to have the port accessed for the first time this coming Monday    Tolerating a regular diet, bowel movements are normal    On exam patient looks well    Incisions are healing very nicely    Impression: Doing well following placement of a left subclavian Mediport    Port is okay to use    Discussed increasing activity level    Follow-up with me as needed   Subjective  Patient ID: Cynthia Cantrell is a 64 y.o. female who presents for Post-op.  HPI    Review of Systems    Objective  Physical Exam    Assessment/Plan           Ady Elise MD 01/24/25 11:10 AM

## 2025-02-13 ENCOUNTER — APPOINTMENT (OUTPATIENT)
Dept: PAIN MEDICINE | Facility: HOSPITAL | Age: 65
End: 2025-02-13
Payer: MEDICAID

## 2025-02-14 ENCOUNTER — APPOINTMENT (OUTPATIENT)
Dept: RADIOLOGY | Facility: HOSPITAL | Age: 65
End: 2025-02-14
Payer: MEDICAID

## 2025-02-14 DIAGNOSIS — M50.30 DDD (DEGENERATIVE DISC DISEASE), CERVICAL: ICD-10-CM

## 2025-02-14 RX ORDER — PREGABALIN 100 MG/1
100 CAPSULE ORAL 3 TIMES DAILY
Qty: 270 CAPSULE | Refills: 0 | Status: SHIPPED | OUTPATIENT
Start: 2025-02-14 | End: 2025-05-15

## 2025-02-15 ENCOUNTER — HOSPITAL ENCOUNTER (EMERGENCY)
Facility: HOSPITAL | Age: 65
Discharge: HOME | End: 2025-02-15
Attending: STUDENT IN AN ORGANIZED HEALTH CARE EDUCATION/TRAINING PROGRAM
Payer: MEDICAID

## 2025-02-15 ENCOUNTER — APPOINTMENT (OUTPATIENT)
Dept: RADIOLOGY | Facility: HOSPITAL | Age: 65
End: 2025-02-15
Payer: MEDICAID

## 2025-02-15 VITALS
TEMPERATURE: 98.6 F | DIASTOLIC BLOOD PRESSURE: 71 MMHG | HEIGHT: 63 IN | OXYGEN SATURATION: 95 % | WEIGHT: 145 LBS | BODY MASS INDEX: 25.69 KG/M2 | HEART RATE: 94 BPM | SYSTOLIC BLOOD PRESSURE: 101 MMHG | RESPIRATION RATE: 16 BRPM
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
## 2025-02-15 PROCEDURE — 73110 X-RAY EXAM OF WRIST: CPT | Mod: 50

## 2025-02-15 PROCEDURE — 73110 X-RAY EXAM OF WRIST: CPT | Mod: BILATERAL PROCEDURE | Performed by: RADIOLOGY

## 2025-02-15 PROCEDURE — 73130 X-RAY EXAM OF HAND: CPT | Mod: BILATERAL PROCEDURE | Performed by: RADIOLOGY

## 2025-02-15 PROCEDURE — 99284 EMERGENCY DEPT VISIT MOD MDM: CPT | Performed by: STUDENT IN AN ORGANIZED HEALTH CARE EDUCATION/TRAINING PROGRAM

## 2025-02-15 PROCEDURE — 73130 X-RAY EXAM OF HAND: CPT | Mod: RT

## 2025-02-15 PROCEDURE — 2500000001 HC RX 250 WO HCPCS SELF ADMINISTERED DRUGS (ALT 637 FOR MEDICARE OP): Performed by: PHYSICIAN ASSISTANT

## 2025-02-15 RX ORDER — METRONIDAZOLE 500 MG/1
500 TABLET ORAL ONCE
Status: COMPLETED | OUTPATIENT
Start: 2025-02-15 | End: 2025-02-15

## 2025-02-15 RX ORDER — DOXYCYCLINE HYCLATE 100 MG
100 TABLET ORAL ONCE
Status: COMPLETED | OUTPATIENT
Start: 2025-02-15 | End: 2025-02-15

## 2025-02-15 RX ORDER — METRONIDAZOLE 500 MG/1
500 TABLET ORAL 3 TIMES DAILY
Qty: 30 TABLET | Refills: 0 | Status: SHIPPED | OUTPATIENT
Start: 2025-02-15 | End: 2025-02-25

## 2025-02-15 RX ORDER — DOXYCYCLINE 100 MG/1
100 CAPSULE ORAL 2 TIMES DAILY
Qty: 20 CAPSULE | Refills: 0 | Status: SHIPPED | OUTPATIENT
Start: 2025-02-15 | End: 2025-02-25

## 2025-02-15 RX ADMIN — DOXYCYCLINE HYCLATE 100 MG: 100 TABLET, COATED ORAL at 10:16

## 2025-02-15 RX ADMIN — METRONIDAZOLE 500 MG: 500 TABLET ORAL at 10:16

## 2025-02-15 ASSESSMENT — PAIN DESCRIPTION - PAIN TYPE: TYPE: ACUTE PAIN

## 2025-02-15 ASSESSMENT — PAIN SCALES - GENERAL: PAINLEVEL_OUTOF10: 5 - MODERATE PAIN

## 2025-02-15 ASSESSMENT — PAIN DESCRIPTION - LOCATION: LOCATION: HAND

## 2025-02-15 ASSESSMENT — COLUMBIA-SUICIDE SEVERITY RATING SCALE - C-SSRS
2. HAVE YOU ACTUALLY HAD ANY THOUGHTS OF KILLING YOURSELF?: NO
6. HAVE YOU EVER DONE ANYTHING, STARTED TO DO ANYTHING, OR PREPARED TO DO ANYTHING TO END YOUR LIFE?: NO
1. IN THE PAST MONTH, HAVE YOU WISHED YOU WERE DEAD OR WISHED YOU COULD GO TO SLEEP AND NOT WAKE UP?: NO

## 2025-02-15 ASSESSMENT — PAIN - FUNCTIONAL ASSESSMENT: PAIN_FUNCTIONAL_ASSESSMENT: 0-10

## 2025-02-15 NOTE — ED PROVIDER NOTES
HPI     CC: Animal Bite     HPI: Cynthia Cantrell is a 64 y.o. female with past medical history of rheumatoid arthritis on immunomodulator (Orencia infusions), Sjogren's, chronic pain, and spinal stenosis presents with concern for dog bite to the right hand and left wrist.  Patient states that on Tuesday, she had put her husky out in the yard to play in the snow.  Her neighbors thought that she needed help getting it back inside and they came over however the husky did not appreciate this.  He was 14 years old and a previous service dog.  She attempted to help break it up and instead of using the leash, she used her hands and he bit her on both hands.  Unfortunately, the dog was put down after this.  He was up-to-date on immunizations.  Patient reports she has been cleansing the wounds but both of her children, 1 whom is a doctor, recommended she come to the emergency department for antibiotics.  She reports no fevers, chills, or significantly new numbness or tingling.  Has some baseline numbness and tingling in fingers 1 through 3/4 that is being worked up as an outpatient.  She states her pain has been controlled as she has tramadol at home for chronic pain.  She is right-hand dominant.    ROS: 10-point review of systems was performed and is otherwise negative except as noted in HPI.      Past Medical History: Noncontributory except per HPI     Past Surgical History: Noncontributory except per HPI     Family History: Reviewed and noncontributory     Social History:  Noncontributory except per HPI       Allergies   Allergen Reactions    Adalimumab Swelling    Dobson Anaphylaxis    Amoxicillin-Pot Clavulanate Swelling and Angioedema    Cephalosporins Swelling and Unknown     swelling    Egg Anaphylaxis and Unknown    Flaxseed Anaphylaxis    Furosemide Anaphylaxis and Swelling    Loperamide Unknown and Angioedema    Methotrexate Swelling, Other and Unknown    Milnacipran Swelling    Neomycin-Polymyxin-Hc  Swelling    Nut - Unspecified Anaphylaxis    Propofol Swelling and Other    Sulfa (Sulfonamide Antibiotics) Swelling    Sulfacetamide Swelling    Sulfamethoxazole Swelling and Other    Tree Nut Anaphylaxis    [place] Anaphylaxis    Capsaicin Hives and Unknown    Capsaicin-Menthol Unknown and Rash    Gabapentin Other and Dizziness       Past Medical History:   Diagnosis Date    Adverse effect of anesthesia     anaphylactic shock with appendectomy    Coronary artery disease     Fibromyalgia     GERD (gastroesophageal reflux disease)     under control    Hyperlipidemia     Hypertension     Old myocardial infarction 11/16/2020    Old myocardial infarct    Other overlap syndromes 10/10/2016    Mixed connective tissue disease    Rheumatoid arthritis     Sjogren syndrome, unspecified (Multi)     Status post insertion of spinal cord stimulator        Home Meds:   Current Outpatient Medications   Medication Instructions    abatacept (Orencia, with maltose,) 250 mg injection Every 28 days    aspirin 81 mg, oral, Daily    chlorhexidine (Peridex) 0.12 % solution 15 ml swish and spit for 30 seconds night prior to surgery and morning of surgery    cyclobenzaprine (FLEXERIL) 10 mg, oral, 3 times daily PRN    diclofenac sodium (VOLTAREN) 100 g, As needed    doxycycline (VIBRAMYCIN) 100 mg, oral, 2 times daily, Take with at least 8 ounces (large glass) of water, do not lie down for 30 minutes after    DULoxetine (CYMBALTA) 60 mg, oral, 2 times daily    ezetimibe (ZETIA) 10 mg, oral, Daily    leflunomide (Arava) 20 mg tablet 1 tablet DAILY (route: oral)    metoprolol succinate XL (TOPROL-XL) 50 mg, oral, Daily    metroNIDAZOLE (FLAGYL) 500 mg, oral, 3 times daily    nystatin (Mycostatin) 100,000 unit/gram powder 1 Application, Topical, 2 times daily    omeprazole (PRILOSEC) 40 mg, oral, Daily    pilocarpine (SALAGEN) 5 mg, oral, 2 times daily    polyvinyl alcohol (Liquifilm Tears) 1.4 % ophthalmic solution 1-2 drops, 4 times daily     "pregabalin (LYRICA) 100 mg, oral, 3 times daily    rosuvastatin (CRESTOR) 20 mg, oral, Daily    traMADol (ULTRAM) 50 mg, oral, Every 4 hours PRN    traZODone (DESYREL) 300 mg, oral, Nightly PRN    Vitamin D3 50 mcg, oral, Daily        ED Triage Vitals [02/15/25 0937]   Temperature Heart Rate Respirations BP   37 °C (98.6 °F) 100 18 129/89      Pulse Ox Temp src Heart Rate Source Patient Position   98 % -- -- --      BP Location FiO2 (%)     -- --         Heart Rate:  []   Temperature:  [37 °C (98.6 °F)]   Respirations:  [16-18]   BP: (101-129)/(71-89)   Height:  [160 cm (5' 3\")]   Weight:  [65.8 kg (145 lb)]   Pulse Ox:  [95 %-98 %]      Physical Exam:  Physical Exam  Constitutional:       General: She is not in acute distress.     Appearance: Normal appearance. She is not ill-appearing.   HENT:      Head: Normocephalic and atraumatic.      Right Ear: External ear normal.      Left Ear: External ear normal.      Nose: Nose normal.      Mouth/Throat:      Mouth: Mucous membranes are moist.   Eyes:      Extraocular Movements: Extraocular movements intact.      Conjunctiva/sclera: Conjunctivae normal.      Pupils: Pupils are equal, round, and reactive to light.   Cardiovascular:      Rate and Rhythm: Normal rate and regular rhythm.      Pulses: Normal pulses.   Pulmonary:      Effort: Pulmonary effort is normal. No respiratory distress.      Breath sounds: Normal breath sounds.   Abdominal:      General: Abdomen is flat.      Palpations: Abdomen is soft.      Tenderness: There is no abdominal tenderness.   Musculoskeletal:         General: Normal range of motion.      Cervical back: Normal range of motion and neck supple.      Comments: See photos.  Right hand: Wounds on the palmar aspect have some surrounding erythema as well as ecchymosis.  No palpable masses.  Patient is unable to fully make a fist due to pain on the dorsal aspect of the hand but is able to fully extend.  Has baseline numbness and tingling in " fingers 1 through 4.  No redness or warmth at the wrist joint.  Left hand: Small wound located over the left wrist on the dorsal aspect.  Range of motion is intact.  Some surrounding erythema but no fluctuance.  2+ radial pulse.   Skin:     General: Skin is warm and dry.      Capillary Refill: Capillary refill takes less than 2 seconds.   Neurological:      General: No focal deficit present.      Mental Status: She is alert and oriented to person, place, and time.   Psychiatric:         Mood and Affect: Mood normal.                  Diagnostic Results        Labs Reviewed - No data to display      XR hand right 3+ views   Final Result   1.  No acute fracture or dislocation.   2.  Degenerative changes of the right hand and wrists bilaterally as   described above..   3.  No evidence of a radiopaque foreign bodies.   Signed by Abran Simms MD      XR wrist 3+ views bilateral   Final Result   1.  No acute fracture or dislocation.   2.  Degenerative changes of the right hand and wrists bilaterally as   described above..   3.  No evidence of a radiopaque foreign bodies.   Signed by Abran Simms MD                    Judy Coma Scale Score: 15                  Procedure  Procedures    ED Course & MDM   Assessment/Plan:     Medications   metroNIDAZOLE (Flagyl) tablet 500 mg (500 mg oral Given 2/15/25 1016)   doxycycline (Vibra-Tabs) tablet 100 mg (100 mg oral Given 2/15/25 1016)        Diagnoses as of 02/15/25 1203   Dog bite, initial encounter       Medical Decision Making    Cynthia Cantrell is a 64 y.o. female with past medical history of rheumatoid arthritis on immunomodulator (Orencia infusions), Sjogren's, chronic pain, and spinal stenosis presents with concern for dog bite to the right hand and left wrist.  Patient is nontoxic-appearing and vital signs are notable for heart rate that borderline tachycardia at 100.  Based on symptoms presentation, differential diagnosis includes dog bites, retained foreign  body, cellulitis, sepsis.  Patient's wounds were already cleansed and have mostly healed as per pictures as above.  Will give first dose of antibiotics in the emergency department of Flagyl and doxycycline due to extensive allergy list.  Patient's tetanus is up-to-date.  Will be seen by attending.  X-rays were obtained to rule out fracture or foreign body and were negative.  Patient's heart rate improved to 94.  Wounds were dressed by nursing.  No further workup indicated.    Seen with Dr. Randall    Dog bite: Patient was educated about this bite.  Since it has been a few days since the bite, patient is at risk for serious infection.  She had extensive allergies, so doxycycline and Flagyl were sent to her emergency department as per EMRA guidelines.  We discussed that she must take these in their entirety.  A follow-up with the wound care clinic was ordered for her if she is unable to be seen by primary care within 2 to 3 days for repeat wound evaluation.  We reviewed wound care instructions which include cleansing the wound twice a day with soap and water, monitoring for signs and symptoms of infection (redness, warmth, swelling, or drainage), and not soaking the wound in water or soap.  Recommended returning to the emergency department for any of the above symptoms or any new symptoms.  Patient agreeable to plan of care and felt comfortable returning home.     Disposition: Home    ED Prescriptions       Medication Sig Dispense Start Date End Date Auth. Provider    doxycycline (Vibramycin) 100 mg capsule Take 1 capsule (100 mg) by mouth 2 times a day for 10 days. Take with at least 8 ounces (large glass) of water, do not lie down for 30 minutes after 20 capsule 2/15/2025 2/25/2025 Yancy Dias PA-C    metroNIDAZOLE (Flagyl) 500 mg tablet Take 1 tablet (500 mg) by mouth 3 times a day for 10 days. 30 tablet 2/15/2025 2/25/2025 Yancy Dias PA-C            Social Determinants Affecting Care: none     Yancy VALDEZ  SASHA Dias    This note was dictated by speech recognition. Minor errors in transcription may be present.     Yancy Dias PA-C  02/15/25 1207

## 2025-02-19 ENCOUNTER — APPOINTMENT (OUTPATIENT)
Dept: OBSTETRICS AND GYNECOLOGY | Facility: CLINIC | Age: 65
End: 2025-02-19
Payer: MEDICAID

## 2025-02-19 VITALS
BODY MASS INDEX: 27.6 KG/M2 | WEIGHT: 150 LBS | HEIGHT: 62 IN | DIASTOLIC BLOOD PRESSURE: 68 MMHG | SYSTOLIC BLOOD PRESSURE: 114 MMHG

## 2025-02-19 DIAGNOSIS — N95.0 POSTMENOPAUSAL BLEEDING: Primary | ICD-10-CM

## 2025-02-19 PROCEDURE — 3074F SYST BP LT 130 MM HG: CPT | Performed by: OBSTETRICS & GYNECOLOGY

## 2025-02-19 PROCEDURE — 3008F BODY MASS INDEX DOCD: CPT | Performed by: OBSTETRICS & GYNECOLOGY

## 2025-02-19 PROCEDURE — 99214 OFFICE O/P EST MOD 30 MIN: CPT | Performed by: OBSTETRICS & GYNECOLOGY

## 2025-02-19 PROCEDURE — 3078F DIAST BP <80 MM HG: CPT | Performed by: OBSTETRICS & GYNECOLOGY

## 2025-02-19 NOTE — PROGRESS NOTES
Subjective   Patient ID: Cynthia Cantrell is a 64 y.o. female who presents for PMB and Pelvic Pain.  Review of endometrial biopsy from October.     Hien Gar MD   Physician  Obstetrics and Gynecology     Progress Notes     Signed     Encounter Date: 10/29/2024  Procedure Orders  Endometrial biopsy [[id number]] ordered by Hien Gar MD      Post-procedure Diagnoses  Postmenopausal bleeding [N95.0]       Signed       Patient presents for PMB   C/O: spotting on and off x 3 months. Had pelvic ultrasound done on 9/13     Mayelin Porter MA II     Endometrial biopsy     Date/Time: 10/29/2024 2:39 PM     Performed by: Hien Gar MD  Authorized by: Hien Gar MD    Consent:     Consent obtained: verbal    Consent given by: patient    Risks discussed: bleeding, infection, need for repeat procedure and pain    Alternatives discussed: observation    Patient agrees, verbalizes understanding, and wants to proceed: yes      Procedure explained and questions answered to patient or proxy's satisfaction: yes    Universal protocol:     Test results available and properly labeled: yes      Imaging studies available: yes      Site/side marked: no      Immediately prior to procedure a time out was called: yes      Patient identity confirmed: verbally with patient  Indications:     Indications: postmenopausal bleeding      Chronicity of post-menopausal bleeding: new    Progression of post-menopausal bleeding: unchanged  Pre-procedure:     Urine pregnancy test: N/A    Procedure:     A bimanual exam was performed: no      Prepped with: Betadine    Tenaculum used: yes      A local block was performed: no      Local anesthetic: none    Cervix dilated: no      Number of passes: 2  Findings:     Cervix: normal      Uterus depth by sound (cm): 6    Specimen collected: low volume sample collected      Patient tolerance: tolerated well, no immediate complications  Comments:     Procedure comments:  65 yo female with intermittent spotting when she wipes and spotting on her underwear over the past few months.  Feels confident the spotting is coming from the vagina.  Had pelvic USN which was largely unremarkable.  Endometrial stripe was <5mm.  Exam done - no abnormalities noted in the vagina.  Offered EMB and pt wanted to proceed with EMB.  EMB done without issue - small amount of tissue obtained.  Will call pt with results once available.  Suspect spotting is related to vulvovaginal atrophy.               Electronically signed by Hien Gar MD at 10/29/2024  2:42 PM  Endometrial biopsy result was insufficient,  lt Information    Status Priority Source  Final result (11/5/2024 1645) Routine ENDOMETRIUM BIOPSY    Case Report                   Case Report  Surgical Pathology                                Case: [id number]                                  Authorizing Provider:  Hien Gar MD   Collected:           10/29/2024 144              Ordering Location:     [facility]               Received:            10/29/2024 1448              Pathologist:           Tangela Marie MD                                                          Specimen:    ENDOMETRIUM BIOPSY                                                                           FINAL DIAGNOSIS  A. ENDOMETRIUM, BIOPSY:   -- Scant cytologically normal endocervical and endometrial epithelium, insufficient for complete characterization, see comment  Electronically signed by Tangela Marie MD on 11/5/2024 at 1644    By the signature on this report, the individual or group listed as making the Final Interpretation/Diagnosis certifies that they have reviewed this case.     Comment  No intact fragments of endometrium are sampled, precluding definitive assessment for possible hyperplasia or neoplasia.  Clinical correlation and follow-up is recommended.    Clinical History  PMB    Gross Description  Received in formalin, labeled with the  patient's name and hospital number, is a scant amount of mucus and soft tissue aggregating to 0.1 x 0.1 x 0.1 cm.  The specimen is submitted in toto in one cassette.  The specimen may not survive processing.    BOZENAK/SBS    Review of ultrasound showing endometrial thickness of 0.42 cm  US PELVIS TRANSABDOMINAL WITH TRANSVAGINAL  Status: Final result    Study Result    Narrative & Impression  Interpreted By:  Chaya Kahn,   STUDY:  US PELVIS TRANSABDOMINAL WITH TRANSVAGINAL;  9/13/2024 1:29 pm      INDICATION:  Signs/Symptoms:vaginal bleediing post menopausal 2019.      ,N93.9 Abnormal uterine and vaginal bleeding, unspecified      COMPARISON:  06/05/2019.      ACCESSION NUMBER(S):  [id number]      ORDERING CLINICIAN:  SHELIA PHAM      TECHNIQUE:  Multiple multiplanar static gray scale, color and spectral waveform  sonographic images of the pelvis were obtained. Transabdominal and  endovaginal ultrasound was performed.      FINDINGS:  UTERUS:  The uterus measures  4.73 x 3.67 x 5.63cm. Limited visualization  transabdominally due to retroflexed position. The myometrium is  heterogeneous. No uterine mass is seen. Few small cervical nabothian  cysts are present.      ENDOMETRIUM:  The endometrium is poorly seen. Some portions are not well defined.  The visualized portion is .42 cm, which is within normal limits for  postmenopausal female.      RIGHT ADNEXA:  Right ovary not visualized. No adnexal abnormality is seen. Bowel gas  limits assessment.      LEFT ADNEXA:  Left ovary not visualized. No adnexal abnormality is seen. Bowel gas  limits assessment.      CUL DE SAC:  No free pelvic fluid      IMPRESSION:  1. Normal-size retroflexed uterus. Poorly defined endometrial  borders, visualized portions measure within normal limits however  given the relatively poor definition and limited visualization, may  still consider correlation with endometrial biopsy. Myometrium is  heterogeneous. No uterine mass is evident.  2.  Ovaries not visualized.      MACRO:  None      Signed by: Chaya Kahn 9/14/2024 4:05 PM  Dictation workstation:   [id number]    New patient to our practice.  Her primary care recommended she have a opinion as she has had off-and-on bleeding.  It has been 6 to 12 months it is very light.  I reviewed her ultrasound which showed a normal endometrial thickness and her endometrial biopsy which showed minimal tissue insufficient for further evaluation but no evidence of cancer    We reviewed it is important to rule out cancer in both her ultrasound and biopsy results are very reassuring.  Most common cause would be menopausal atrophy.  If further investigation is warranted or requested by the patient it would be an outpatient hysteroscopy D&C but I do not think this is necessary based on a review of her records    She is in a wheelchair because of severe spinal stenosis she has a stimulator and also has rheumatoid arthritis.    Pelvic Pain  The patient's primary symptoms include pelvic pain.       Review of Systems   Genitourinary:  Positive for pelvic pain and vaginal bleeding.       Objective   Physical Exam  Constitutional:       Appearance: Normal appearance. She is normal weight.   Neurological:      Mental Status: She is alert.         Assessment/Plan   Off-and-on postmenopausal bleeding.  Review of ultrasound and endometrial biopsy.  Do not recommend any further investigation.  Reassurance that there is no evidence of cancer .  Discussed the use of vaginal estrogen cream but she is not sexually active and feels she has enough medical issues without adding vaginal estrogen cream to her regimen         Terry Yadav MD 02/19/25 1:39 PM

## 2025-02-21 ENCOUNTER — OFFICE VISIT (OUTPATIENT)
Dept: WOUND CARE | Facility: HOSPITAL | Age: 65
End: 2025-02-21
Payer: MEDICAID
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
## 2025-02-21 PROCEDURE — 99214 OFFICE O/P EST MOD 30 MIN: CPT

## 2025-02-23 NOTE — PROGRESS NOTES
Subjective   Patient ID: Cynthia Cantrell is a 64 y.o. female who presents for Follow-up (FUV- for RA. After surgery was unable to move due to muscle loss. Hard to walk due to weakness in legs and muscles. Patient need a clearance for infusion. Patient was unable to complete due to medication after suffering a dog bite.).    Last seen 6 mos ago sept 2024    Dog bite 2 weeks ago   Did not go to ER  Got ATB 10 days  Infusion postponed bec of dog bite  No rabies In dog . Put down by police order     HPI   · Rheumatoid arthritis (714.0) (M06.9)   · Vitamin D deficiency (268.9) (E55.9)   · Sjogrens syndrome (710.2) (M35.00)   · History of COVID (079.89) (U07.1)   · Long-term use of Plaquenil (V58.69) (Z79.899)   · Encounter for long-term current use of high risk medication (V58.69) (Z79.899)    No flare of RA  Needs Tramadol until sees her Tramadol provider can see her in sept   Stim stimulator helps pain  Injection in R shoulder   Pain L hip injected still painful when sitting  Still has pain r wrist I can inject that today    Went over all her meds on her extensive list.  She is continuing with all without dose change.  Orencia  Working well occasional flare utilizing a Medrol Dosepak  PAIN:  SWELLING:   AM GEL:  SIDE EFFECTS OF MED:    LAST LAB  Lab Results   Component Value Date    SEDRATE 3 09/25/2024    CRP <0.10 09/25/2024    TSH 3.11 04/15/2019         PHYSICAL EXAM  NODES   HEART  LUNGS  ABDOMEN   VASCULAR  NEURO   SKIN  JOINTS some synovial thickening at the right wrist there is no evidence for any active synovitis elsewhere in the upper or lower extremity  There is currently no information documented on the homunculus. Go to the Rheumatology activity and complete the homunculus joint exam.   Assessment/Plan   Diagnoses and all orders for this visit:  Coronary arteriosclerosis in native artery  Rheumatoid arthritis involving multiple sites with positive rheumatoid factor (Multi)  Positive sm/RNP  antibody  Sjogren's syndrome with keratoconjunctivitis sicca (Multi)  Chronic low back pain, unspecified back pain laterality, unspecified whether sciatica present    Doing well no flares  She will continue all of her medications including Plaquenil leflunomide tramadol pilocarpine pregabalin and on occasion a Medrol Dosepak.  Labs normal in December 2023.Patient ID:    To ER 10 days ago  For dog bit  Here to resume ORENCIA  Can get ORENCIA when needed.   See back at reg appt

## 2025-02-24 ENCOUNTER — APPOINTMENT (OUTPATIENT)
Dept: RHEUMATOLOGY | Facility: CLINIC | Age: 65
End: 2025-02-24
Payer: MEDICAID

## 2025-02-24 VITALS
BODY MASS INDEX: 27.44 KG/M2 | SYSTOLIC BLOOD PRESSURE: 127 MMHG | DIASTOLIC BLOOD PRESSURE: 88 MMHG | WEIGHT: 150 LBS | OXYGEN SATURATION: 97 % | HEART RATE: 91 BPM

## 2025-02-24 DIAGNOSIS — R76.8 POSITIVE SM/RNP ANTIBODY: ICD-10-CM

## 2025-02-24 DIAGNOSIS — M54.50 CHRONIC LOW BACK PAIN, UNSPECIFIED BACK PAIN LATERALITY, UNSPECIFIED WHETHER SCIATICA PRESENT: ICD-10-CM

## 2025-02-24 DIAGNOSIS — M05.79 RHEUMATOID ARTHRITIS INVOLVING MULTIPLE SITES WITH POSITIVE RHEUMATOID FACTOR (MULTI): ICD-10-CM

## 2025-02-24 DIAGNOSIS — I25.10 CORONARY ARTERIOSCLEROSIS IN NATIVE ARTERY: Primary | ICD-10-CM

## 2025-02-24 DIAGNOSIS — M35.01 SJOGREN'S SYNDROME WITH KERATOCONJUNCTIVITIS SICCA (MULTI): ICD-10-CM

## 2025-02-24 DIAGNOSIS — G89.29 CHRONIC LOW BACK PAIN, UNSPECIFIED BACK PAIN LATERALITY, UNSPECIFIED WHETHER SCIATICA PRESENT: ICD-10-CM

## 2025-02-24 PROCEDURE — 3079F DIAST BP 80-89 MM HG: CPT | Performed by: INTERNAL MEDICINE

## 2025-02-24 PROCEDURE — 3074F SYST BP LT 130 MM HG: CPT | Performed by: INTERNAL MEDICINE

## 2025-02-24 PROCEDURE — 99213 OFFICE O/P EST LOW 20 MIN: CPT | Performed by: INTERNAL MEDICINE

## 2025-02-26 DIAGNOSIS — I10 HYPERTENSION, UNSPECIFIED TYPE: ICD-10-CM

## 2025-02-27 RX ORDER — METOPROLOL SUCCINATE 50 MG/1
50 TABLET, EXTENDED RELEASE ORAL DAILY
Qty: 90 TABLET | Refills: 3 | Status: SHIPPED | OUTPATIENT
Start: 2025-02-27 | End: 2026-02-27

## 2025-02-28 ENCOUNTER — OFFICE VISIT (OUTPATIENT)
Dept: WOUND CARE | Facility: HOSPITAL | Age: 65
End: 2025-02-28
Payer: MEDICAID

## 2025-02-28 ENCOUNTER — TELEMEDICINE (OUTPATIENT)
Dept: PRIMARY CARE | Facility: CLINIC | Age: 65
End: 2025-02-28
Payer: MEDICAID

## 2025-02-28 DIAGNOSIS — G89.29 OTHER CHRONIC PAIN: ICD-10-CM

## 2025-02-28 DIAGNOSIS — M50.30 DDD (DEGENERATIVE DISC DISEASE), CERVICAL: ICD-10-CM

## 2025-02-28 DIAGNOSIS — G47.00 INSOMNIA, UNSPECIFIED TYPE: ICD-10-CM

## 2025-02-28 PROCEDURE — 99213 OFFICE O/P EST LOW 20 MIN: CPT

## 2025-02-28 PROCEDURE — 99212 OFFICE O/P EST SF 10 MIN: CPT | Performed by: NURSE PRACTITIONER

## 2025-02-28 RX ORDER — DULOXETIN HYDROCHLORIDE 60 MG/1
60 CAPSULE, DELAYED RELEASE ORAL 2 TIMES DAILY
Qty: 200 CAPSULE | Refills: 3 | Status: SHIPPED | OUTPATIENT
Start: 2025-02-28

## 2025-02-28 RX ORDER — TRAZODONE HYDROCHLORIDE 100 MG/1
300 TABLET ORAL NIGHTLY PRN
Qty: 270 TABLET | Refills: 2 | Status: SHIPPED | OUTPATIENT
Start: 2025-02-28

## 2025-02-28 RX ORDER — TRAMADOL HYDROCHLORIDE 50 MG/1
50 TABLET ORAL EVERY 4 HOURS PRN
Qty: 160 TABLET | Refills: 0 | Status: SHIPPED | OUTPATIENT
Start: 2025-02-28 | End: 2025-03-30

## 2025-02-28 NOTE — PROGRESS NOTES
Subjective   Cynthia Cantrell is a 64 y.o. female who presents for No chief complaint on file..  HPI  Having increased pain  Taking the pain meds  Frustrated about pain currently  No numbness weakness  Nothing makes pain better or worse  Is trying to work through it  Did see gyn, will keep eye on vaginal spotting/bleeding may need repeat testing      All systems reviewed. Review of systems negative except for noted positives in HPI    Objective     LMP  (LMP Unknown)    Vital signs noted and reviewed.       Physical Exam  NO PE since video exam      Assessment/Plan   Problem List Items Addressed This Visit          Sleep    Insomnia    Relevant Medications    traZODone (Desyrel) 100 mg tablet     Other Visit Diagnoses       Other chronic pain        Relevant Medications    DULoxetine (Cymbalta) 60 mg DR capsule    DDD (degenerative disc disease), cervical        Relevant Medications    traMADol (Ultram) 50 mg tablet                Med renewed  Oaars reviewed  See back in a month or sooner if needed

## 2025-03-14 ENCOUNTER — APPOINTMENT (OUTPATIENT)
Dept: OTOLARYNGOLOGY | Facility: CLINIC | Age: 65
End: 2025-03-14
Payer: MEDICAID

## 2025-03-14 VITALS — BODY MASS INDEX: 25.76 KG/M2 | HEIGHT: 62 IN | WEIGHT: 140 LBS

## 2025-03-14 DIAGNOSIS — H92.10 OTORRHEA, UNSPECIFIED LATERALITY: ICD-10-CM

## 2025-03-14 DIAGNOSIS — R42 DIZZINESS AND GIDDINESS: ICD-10-CM

## 2025-03-14 DIAGNOSIS — H93.11 TINNITUS OF RIGHT EAR: ICD-10-CM

## 2025-03-14 DIAGNOSIS — H73.11 CHRONIC MYRINGITIS OF RIGHT EAR: ICD-10-CM

## 2025-03-14 DIAGNOSIS — H92.11 OTORRHEA OF RIGHT EAR: Primary | ICD-10-CM

## 2025-03-14 DIAGNOSIS — H93.8X1 FULLNESS IN RIGHT EAR: ICD-10-CM

## 2025-03-14 PROCEDURE — 3008F BODY MASS INDEX DOCD: CPT

## 2025-03-14 PROCEDURE — 99203 OFFICE O/P NEW LOW 30 MIN: CPT

## 2025-03-14 PROCEDURE — 1036F TOBACCO NON-USER: CPT

## 2025-03-14 RX ORDER — CIPROFLOXACIN AND DEXAMETHASONE 3; 1 MG/ML; MG/ML
SUSPENSION/ DROPS AURICULAR (OTIC)
Qty: 7.5 ML | Refills: 1 | Status: SHIPPED | OUTPATIENT
Start: 2025-03-14 | End: 2025-03-19

## 2025-03-14 NOTE — PROGRESS NOTES
Chief Complaint   Patient presents with    New Patient Visit     N/P  has had tympanoplasty right ear,      HPI:  Cynthia Cantrell is a 64 y.o. female who presents today with complaints of clear drainage on the right side only when she has been laying on that side.  She further reports when this occurs and she attempts to get up she feels dizzy and cannot find her center of gravity until she uses a Q-tip to clean out the ear usually lasts 1 to 2 minutes.  She admits to tinnitus on the right and a sense of fullness but denies any symptoms on the left    Personal history of tympanoplasty with graft.  PMH:  Past Medical History:   Diagnosis Date    Adverse effect of anesthesia     anaphylactic shock with appendectomy    Coronary artery disease     Fibromyalgia     GERD (gastroesophageal reflux disease)     under control    Hyperlipidemia     Hypertension     Old myocardial infarction 11/16/2020    Old myocardial infarct    Other overlap syndromes 10/10/2016    Mixed connective tissue disease    Rheumatoid arthritis     Sjogren syndrome, unspecified (Multi)     Status post insertion of spinal cord stimulator      Past Surgical History:   Procedure Laterality Date    APPENDECTOMY  1972    Appendectomy    CT ABDOMEN PELVIS ANGIOGRAM W AND/OR WO IV CONTRAST  04/11/2019    CT ABDOMEN PELVIS ANGIOGRAM W AND/OR WO IV CONTRAST 4/11/2019 [facility] CLINICAL LEGACY    FOOT SURGERY Right     shace bone and internal hardware    LUMBAR LAMINECTOMY      MIDDLE EAR SURGERY Right 1968    mastoidectomy / tympanoplasty    ORIF ANKLE FRACTURE Left     OTHER SURGICAL HISTORY      removal vocal cord polyps    SPINAL CORD STIMULATOR IMPLANT      TYMPANOPLASTY           Medications:     Current Outpatient Medications:     aspirin 81 mg EC tablet, Take 1 tablet (81 mg) by mouth once daily., Disp: 90 tablet, Rfl: 3    diclofenac sodium (Voltaren) 1 % gel, Place 112.5 inches (100 g) on the skin if needed., Disp: , Rfl:     DULoxetine  (Cymbalta) 60 mg DR capsule, Take 1 capsule (60 mg) by mouth 2 times a day., Disp: 200 capsule, Rfl: 3    ezetimibe (Zetia) 10 mg tablet, Take 1 tablet (10 mg) by mouth once daily., Disp: 90 tablet, Rfl: 3    leflunomide (Arava) 20 mg tablet, 1 tablet DAILY (route: oral), Disp: 90 tablet, Rfl: 2    metoprolol succinate XL (Toprol-XL) 50 mg 24 hr tablet, Take 1 tablet (50 mg) by mouth once daily., Disp: 90 tablet, Rfl: 3    nystatin (Mycostatin) 100,000 unit/gram powder, Apply 1 Application topically 2 times a day., Disp: 60 g, Rfl: 3    omeprazole (PriLOSEC) 40 mg DR capsule, Take 1 capsule (40 mg) by mouth once daily., Disp: 90 capsule, Rfl: 3    pilocarpine (Salagen) 5 mg tablet, Take 1 tablet (5 mg) by mouth 2 times a day., Disp: 180 tablet, Rfl: 2    polyvinyl alcohol (Liquifilm Tears) 1.4 % ophthalmic solution, Administer 1-2 drops into affected eye(s) 4 times a day., Disp: , Rfl:     pregabalin (Lyrica) 100 mg capsule, Take 1 capsule (100 mg) by mouth 3 times a day., Disp: 270 capsule, Rfl: 0    rosuvastatin (Crestor) 20 mg tablet, Take 1 tablet (20 mg) by mouth once daily., Disp: 90 tablet, Rfl: 3    traMADol (Ultram) 50 mg tablet, Take 1 tablet (50 mg) by mouth every 4 hours if needed for severe pain (7 - 10)., Disp: 160 tablet, Rfl: 0    traZODone (Desyrel) 100 mg tablet, Take 3 tablets (300 mg) by mouth as needed at bedtime for sleep (1 to 3 tablets at night (100 mg to 300 mg at night as needed)) for up to 270 doses., Disp: 270 tablet, Rfl: 2    Vitamin D3 50 mcg (2,000 unit) capsule, Take 1 capsule (50 mcg) by mouth once daily., Disp: 90 capsule, Rfl: 3    abatacept (Orencia, with maltose,) 250 mg injection, Infuse into a venous catheter every 28 (twenty-eight) days., Disp: , Rfl:     chlorhexidine (Peridex) 0.12 % solution, 15 ml swish and spit for 30 seconds night prior to surgery and morning of surgery, Disp: 473 mL, Rfl: 0    ciprofloxacin-dexamethasone (CiproDEX) otic suspension, Administer 4 drops  "twice daily to affected ear for 5 days, Disp: 7.5 mL, Rfl: 1    cyclobenzaprine (Flexeril) 10 mg tablet, Take 1 tablet (10 mg) by mouth 3 times a day as needed for muscle spasms., Disp: 270 tablet, Rfl: 3     Allergies:  Allergies   Allergen Reactions    Adalimumab Swelling    [place] Anaphylaxis    Amoxicillin-Pot Clavulanate Swelling and Angioedema    Cephalosporins Swelling and Unknown     swelling    Egg Anaphylaxis and Unknown    Flaxseed Anaphylaxis    Furosemide Anaphylaxis and Swelling    Loperamide Unknown and Angioedema    Methotrexate Swelling, Other and Unknown    Milnacipran Swelling    Neomycin-Polymyxin-Hc Swelling    Nut - Unspecified Anaphylaxis    Propofol Swelling and Other    Sulfa (Sulfonamide Antibiotics) Swelling    Sulfacetamide Swelling    Sulfamethoxazole Swelling and Other    Tree Nut Anaphylaxis    [place] Anaphylaxis    Capsaicin Hives and Unknown    Capsaicin-Menthol Unknown and Rash    Gabapentin Other and Dizziness        ROS:  Review of systems normal unless stated otherwise in the HPI and/or PMH.    Physical Exam:  Height 1.575 m (5' 2\"), weight 63.5 kg (140 lb). Body mass index is 25.61 kg/m².     GENERAL APPEARANCE: Well developed and well nourished.  Alert and oriented in no acute distress.  Normal vocal quality.      HEAD/FACE: No erythema or edema or facial tenderness.  Normal facial nerve function bilaterally.    EAR:       EXTERNAL: Normal pinnas and external auditory canals without lesion or obstructing wax.       MIDDLE EAR: Right tympanic membrane intact erythemic with moist granulosus area with weeping suction excess fluid.  Left tympanic membrane intact  and mobile with normal landmarks.  Middle ear space appears well aerated.       TUBE STATUS: N/A       MASTOID CAVITY: N/A       HEARING: Gross hearing assessment is within normal limits.       Dr. De La O assisted with right ear examination  NOSE:       VISUALIZED USING: Anterior rhinoscopy with headlight and nasal " speculum.       DORSUM: Midline, nontraumatic appearance.       MUCOSA: Normal-appearing.       SECRETIONS: Normal.       SEPTUM: Midline and nonobstructing.       INFERIOR TURBINATES: Normal.       MIDDLE TURBINATES/MEATUS: N/A       BLEEDING: N/A         ORAL CAVITY/PHARYNX:       TEETH: Adequate dentition.       TONGUE: No mass or lesion.  Normal mobility.       FLOOR OF MOUTH: No mass or lesion.       PALATE: Normal hard palate, soft palate, and uvula.       OROPHARYNX: Normal without mass or lesion.       BUCCAL MUCOSA/GBS: Normal without mass or lesion.       LIPS: Normal.    LARYNX/HYPOPHARYNX/NASOPHARYNX: N/A    NECK: No palpable masses or abnormal adenopathy.  Trachea is midline.    THYROID: No thyromegaly or palpable nodule.    SALIVARY GLANDS: Normal bilateral parotid and submandibular glands by inspection and palpation.    TMJ's: Normal.    NEURO: Cranial nerve exam grossly normal bilaterally.       Assessment/Plan   Cynthia was seen today for new patient visit.  Diagnoses and all orders for this visit:  Otorrhea of right ear (Primary)  -     Referral to ENT  Dizziness and giddiness  Tinnitus of right ear  Fullness in right ear  Otorrhea, unspecified laterality  -     ciprofloxacin-dexamethasone (CiproDEX) otic suspension; Administer 4 drops twice daily to affected ear for 5 days  Chronic myringitis of right ear     Rosaline will use Ciprodex on the right and follow-up in a week or 2.  Consider audiogram at that time.   No follow-ups on file.     MEERA Pratt-CNP

## 2025-03-15 ENCOUNTER — PATIENT MESSAGE (OUTPATIENT)
Dept: RHEUMATOLOGY | Facility: CLINIC | Age: 65
End: 2025-03-15
Payer: MEDICAID

## 2025-03-15 DIAGNOSIS — M35.01 SJOGREN'S SYNDROME WITH KERATOCONJUNCTIVITIS SICCA (MULTI): ICD-10-CM

## 2025-03-17 RX ORDER — PILOCARPINE HYDROCHLORIDE 5 MG/1
5 TABLET, FILM COATED ORAL 2 TIMES DAILY
Qty: 180 TABLET | Refills: 2 | Status: SHIPPED | OUTPATIENT
Start: 2025-03-17

## 2025-03-17 NOTE — PATIENT COMMUNICATION
Patient says you refused her last request for this med. She's asking again if it can be refilled.      Prescription Request for Pilocarpine        Has The Patient Been Identified By Name And Date Of Birth: yes    RX Requestor: patient    Date of Last Refill: 6/18/24    Date Of Last Office Visit: 2/24/25    Date Of Future Office Visit: 8/25/25

## 2025-03-28 ENCOUNTER — OFFICE VISIT (OUTPATIENT)
Dept: PRIMARY CARE | Facility: CLINIC | Age: 65
End: 2025-03-28
Payer: MEDICAID

## 2025-03-28 VITALS
BODY MASS INDEX: 26.55 KG/M2 | DIASTOLIC BLOOD PRESSURE: 84 MMHG | TEMPERATURE: 97.7 F | WEIGHT: 144.3 LBS | RESPIRATION RATE: 16 BRPM | OXYGEN SATURATION: 97 % | HEIGHT: 62 IN | HEART RATE: 83 BPM | SYSTOLIC BLOOD PRESSURE: 126 MMHG

## 2025-03-28 DIAGNOSIS — M54.12 CERVICAL RADICULOPATHY: ICD-10-CM

## 2025-03-28 DIAGNOSIS — Z00.00 HEALTHCARE MAINTENANCE: ICD-10-CM

## 2025-03-28 DIAGNOSIS — M50.30 DDD (DEGENERATIVE DISC DISEASE), CERVICAL: ICD-10-CM

## 2025-03-28 DIAGNOSIS — R22.2 CHEST SWELLING: Primary | ICD-10-CM

## 2025-03-28 PROCEDURE — 99214 OFFICE O/P EST MOD 30 MIN: CPT | Performed by: NURSE PRACTITIONER

## 2025-03-28 PROCEDURE — 36415 COLL VENOUS BLD VENIPUNCTURE: CPT | Performed by: NURSE PRACTITIONER

## 2025-03-28 PROCEDURE — 3079F DIAST BP 80-89 MM HG: CPT | Performed by: NURSE PRACTITIONER

## 2025-03-28 PROCEDURE — 3074F SYST BP LT 130 MM HG: CPT | Performed by: NURSE PRACTITIONER

## 2025-03-28 PROCEDURE — 3008F BODY MASS INDEX DOCD: CPT | Performed by: NURSE PRACTITIONER

## 2025-03-28 RX ORDER — TRAMADOL HYDROCHLORIDE 50 MG/1
50 TABLET ORAL EVERY 4 HOURS PRN
Qty: 160 TABLET | Refills: 0 | Status: SHIPPED | OUTPATIENT
Start: 2025-03-28 | End: 2025-04-27

## 2025-03-28 RX ORDER — PREGABALIN 100 MG/1
100 CAPSULE ORAL 3 TIMES DAILY
Qty: 270 CAPSULE | Refills: 3 | Status: SHIPPED | OUTPATIENT
Start: 2025-03-28 | End: 2025-06-26

## 2025-03-28 ASSESSMENT — ENCOUNTER SYMPTOMS
DEPRESSION: 0
LOSS OF SENSATION IN FEET: 0
OCCASIONAL FEELINGS OF UNSTEADINESS: 1

## 2025-03-28 ASSESSMENT — PAIN SCALES - GENERAL: PAINLEVEL_OUTOF10: 0-NO PAIN

## 2025-03-28 NOTE — PROGRESS NOTES
"Subjective   Cynthia Cantrell is a 64 y.o. female who presents for Follow-up.  HPI    Still having right side cervical neckpain, numbness in right 4th and 5th fingers  Having   Moderate amount of right upper chest wall swelling  No fever or  chills    No recent imaging on either    Needs pain meds  Pain not well controlled currently    Sees ent inflammation and has been using drops  But after drops comes back right away      All systems reviewed. Review of systems negative except for noted positives in HPI    Objective     /84   Pulse 83   Temp 36.5 °C (97.7 °F)   Resp 16   Ht 1.575 m (5' 2\")   Wt 63.5 kg (140 lb)   LMP  (LMP Unknown)   SpO2 97%   BMI 25.61 kg/m²    Vital signs noted and reviewed.       Physical Exam  Vitals and nursing note reviewed.   Constitutional:       Appearance: Normal appearance.   HENT:      Head: Normocephalic and atraumatic.      Right Ear: Tympanic membrane normal.      Left Ear: Tympanic membrane normal.      Nose: Nose normal.      Mouth/Throat:      Mouth: Mucous membranes are moist.   Eyes:      Extraocular Movements: Extraocular movements intact.      Conjunctiva/sclera: Conjunctivae normal.   Neck:      Comments: Right upper chest wall swelling some tenderness  Cardiovascular:      Rate and Rhythm: Normal rate and regular rhythm.      Pulses: Normal pulses.      Heart sounds: Normal heart sounds.   Pulmonary:      Effort: Pulmonary effort is normal.      Breath sounds: Normal breath sounds.   Abdominal:      General: Bowel sounds are normal.      Palpations: Abdomen is soft.   Musculoskeletal:         General: Normal range of motion.   Skin:     General: Skin is warm.      Capillary Refill: Capillary refill takes 2 to 3 seconds.   Neurological:      Mental Status: She is alert and oriented to person, place, and time.   Psychiatric:         Behavior: Behavior normal.       See photo below        Assessment/Plan   Problem List Items Addressed This Visit  "   None

## 2025-03-30 LAB
ALBUMIN SERPL-MCNC: 4 G/DL (ref 3.6–5.1)
ALP SERPL-CCNC: 60 U/L (ref 37–153)
ALT SERPL-CCNC: 21 U/L (ref 6–29)
ANION GAP SERPL CALCULATED.4IONS-SCNC: 8 MMOL/L (CALC) (ref 7–17)
AST SERPL-CCNC: 22 U/L (ref 10–35)
BASOPHILS # BLD AUTO: 70 CELLS/UL (ref 0–200)
BASOPHILS NFR BLD AUTO: 1.2 %
BILIRUB SERPL-MCNC: 0.5 MG/DL (ref 0.2–1.2)
BUN SERPL-MCNC: 9 MG/DL (ref 7–25)
CALCIUM SERPL-MCNC: 9 MG/DL (ref 8.6–10.4)
CHLORIDE SERPL-SCNC: 103 MMOL/L (ref 98–110)
CO2 SERPL-SCNC: 28 MMOL/L (ref 20–32)
CREAT SERPL-MCNC: 0.67 MG/DL (ref 0.5–1.05)
EGFRCR SERPLBLD CKD-EPI 2021: 98 ML/MIN/1.73M2
EOSINOPHIL # BLD AUTO: 429 CELLS/UL (ref 15–500)
EOSINOPHIL NFR BLD AUTO: 7.4 %
ERYTHROCYTE [DISTWIDTH] IN BLOOD BY AUTOMATED COUNT: 13.1 % (ref 11–15)
GLUCOSE SERPL-MCNC: 82 MG/DL (ref 65–99)
HCT VFR BLD AUTO: 44.1 % (ref 35–45)
HGB BLD-MCNC: 13.9 G/DL (ref 11.7–15.5)
LYMPHOCYTES # BLD AUTO: 2082 CELLS/UL (ref 850–3900)
LYMPHOCYTES NFR BLD AUTO: 35.9 %
MCH RBC QN AUTO: 29.3 PG (ref 27–33)
MCHC RBC AUTO-ENTMCNC: 31.5 G/DL (ref 32–36)
MCV RBC AUTO: 93 FL (ref 80–100)
MONOCYTES # BLD AUTO: 400 CELLS/UL (ref 200–950)
MONOCYTES NFR BLD AUTO: 6.9 %
NEUTROPHILS # BLD AUTO: 2819 CELLS/UL (ref 1500–7800)
NEUTROPHILS NFR BLD AUTO: 48.6 %
PLATELET # BLD AUTO: 233 THOUSAND/UL (ref 140–400)
PMV BLD REES-ECKER: 12 FL (ref 7.5–12.5)
POTASSIUM SERPL-SCNC: 4.4 MMOL/L (ref 3.5–5.3)
PROT SERPL-MCNC: 5.8 G/DL (ref 6.1–8.1)
RBC # BLD AUTO: 4.74 MILLION/UL (ref 3.8–5.1)
SODIUM SERPL-SCNC: 139 MMOL/L (ref 135–146)
WBC # BLD AUTO: 5.8 THOUSAND/UL (ref 3.8–10.8)

## 2025-04-11 ENCOUNTER — HOSPITAL ENCOUNTER (OUTPATIENT)
Dept: RADIOLOGY | Facility: CLINIC | Age: 65
Discharge: HOME | End: 2025-04-11
Payer: MEDICAID

## 2025-04-11 DIAGNOSIS — R22.2 CHEST SWELLING: ICD-10-CM

## 2025-04-11 PROCEDURE — 2550000001 HC RX 255 CONTRASTS: Performed by: NURSE PRACTITIONER

## 2025-04-11 PROCEDURE — 71260 CT THORAX DX C+: CPT

## 2025-04-11 RX ADMIN — IOHEXOL 75 ML: 350 INJECTION, SOLUTION INTRAVENOUS at 11:29

## 2025-04-22 ENCOUNTER — HOSPITAL ENCOUNTER (OUTPATIENT)
Dept: RADIOLOGY | Facility: CLINIC | Age: 65
Discharge: HOME | End: 2025-04-22
Payer: MEDICAID

## 2025-04-22 VITALS — WEIGHT: 144.4 LBS | HEIGHT: 62 IN | BODY MASS INDEX: 26.57 KG/M2

## 2025-04-22 DIAGNOSIS — Z00.00 HEALTHCARE MAINTENANCE: ICD-10-CM

## 2025-04-22 DIAGNOSIS — M54.12 CERVICAL RADICULOPATHY: ICD-10-CM

## 2025-04-22 PROCEDURE — 72125 CT NECK SPINE W/O DYE: CPT | Performed by: RADIOLOGY

## 2025-04-22 PROCEDURE — 72125 CT NECK SPINE W/O DYE: CPT

## 2025-04-22 PROCEDURE — 77067 SCR MAMMO BI INCL CAD: CPT

## 2025-04-22 PROCEDURE — 77067 SCR MAMMO BI INCL CAD: CPT | Performed by: RADIOLOGY

## 2025-04-22 PROCEDURE — 77063 BREAST TOMOSYNTHESIS BI: CPT | Performed by: RADIOLOGY

## 2025-04-25 ENCOUNTER — TELEMEDICINE (OUTPATIENT)
Dept: PRIMARY CARE | Facility: CLINIC | Age: 65
End: 2025-04-25
Payer: MEDICAID

## 2025-04-25 DIAGNOSIS — M50.30 DDD (DEGENERATIVE DISC DISEASE), CERVICAL: ICD-10-CM

## 2025-04-25 PROCEDURE — 99214 OFFICE O/P EST MOD 30 MIN: CPT | Performed by: NURSE PRACTITIONER

## 2025-04-25 RX ORDER — LORAZEPAM 1 MG/1
1-2 TABLET ORAL SEE ADMIN INSTRUCTIONS
Qty: 10 TABLET | Refills: 0 | Status: SHIPPED | OUTPATIENT
Start: 2025-04-25 | End: 2025-05-25

## 2025-04-25 RX ORDER — PREGABALIN 100 MG/1
100 CAPSULE ORAL 3 TIMES DAILY
Qty: 270 CAPSULE | Refills: 3 | Status: SHIPPED | OUTPATIENT
Start: 2025-04-25 | End: 2025-07-24

## 2025-04-25 RX ORDER — TRAMADOL HYDROCHLORIDE 50 MG/1
50 TABLET ORAL EVERY 4 HOURS PRN
Qty: 160 TABLET | Refills: 0 | Status: SHIPPED | OUTPATIENT
Start: 2025-04-25 | End: 2025-05-25

## 2025-04-25 NOTE — PROGRESS NOTES
Subjective   Cynthia Cantrell is a 64 y.o. female who presents for No chief complaint on file..  HPI  Discussed mammogram results  Ct scan chest  Ct scan neck  Pain meds    Having moderate to severe pain still  Due to shot on Monday  Has a lot of pain today    Mammogram wnl, repeat in a year  Pain meds will refill and lyrica    Ct scan chest stable no acute findings at this time    Ct scan neck showed significant ddd, and has some numbness in r hand at times, has significant RA    Discussed re mri cervical, she will do it, thinks will be okay without sedation; will send ativan into pharmacy. She knows that if she takes ativan needs a  and cannot take her pain medications    Otherwise continues to worse aggressively at her own PT regimen    All systems reviewed. Review of systems negative except for noted positives in HPI    Objective     LMP  (LMP Unknown)    Vital signs noted and reviewed.       Physical Exam telehealth visit        Assessment/Plan   Problem List Items Addressed This Visit    None  Visit Diagnoses         DDD (degenerative disc disease), cervical        Relevant Medications    LORazepam (Ativan) 1 mg tablet    traMADol (Ultram) 50 mg tablet    pregabalin (Lyrica) 100 mg capsule    Other Relevant Orders    MR cervical spine wo IV contrast                  See back in a month  [place] reviewed  Pain contract  Mri

## 2025-05-01 ENCOUNTER — OFFICE VISIT (OUTPATIENT)
Dept: PAIN MEDICINE | Facility: HOSPITAL | Age: 65
End: 2025-05-01
Payer: MEDICAID

## 2025-05-01 DIAGNOSIS — M96.1 POSTLAMINECTOMY SYNDROME OF LUMBAR REGION: Primary | ICD-10-CM

## 2025-05-01 DIAGNOSIS — M25.552 PAIN OF LEFT HIP: ICD-10-CM

## 2025-05-01 DIAGNOSIS — M54.12 CERVICAL RADICULOPATHY: ICD-10-CM

## 2025-05-01 PROCEDURE — 99214 OFFICE O/P EST MOD 30 MIN: CPT | Performed by: ANESTHESIOLOGY

## 2025-05-01 PROCEDURE — 1036F TOBACCO NON-USER: CPT | Performed by: ANESTHESIOLOGY

## 2025-05-01 NOTE — PROGRESS NOTES
Subjective   Patient ID: Cynthia Cantrell is a 64 y.o. female with a past medical history of lumbar post laminectomy syndrome status post SCS    HPI:   Cynthia is an established patient here for follow-up for worsening low back pain with radicular symptoms and cervical radicular pain.  For her low back, we placed SCS 1 year ago for her postlaminectomy syndrome.  Previously her pain was in the back and radiate down the left leg.  That pain is improved significantly, she has not followed up with us or [facility] since implant.  However, over the past 4 months, she has worsening radicular symptoms down her right lower extremity.  She wanted to speak to us before the [facility].    For her neck pain, she endorsed worst pain down into the left shoulder and arm, but numbness and tingling down the first 2 fingers of the right hand.  Her PCP ordered a cervical CT and cervical MRI.  She tried to schedule her cervical MRI at [place], but they recommended obtaining at [facility] or [facility].  She wanted to know if we had a preference for an imaging facility.    She is also complaining of left hip pain.  Has had left intra-articular hip injection fluoroscopy with improvement, would like to have it repeated.  Last hip injection was 5/10/2024 for which the patient says provided her more than 60% relief and lasted for more than 6 months at a time.    Pain score 7/10  Physical Therapy: The patient completed more than six weeks of formal physical therapy more than six months ago, but has done physician-directed exercises at home for 2-3 times per week for greater than six weeks with minimal improvement  Other Conservative Measures she has tried: Heating Pad, Ice, Massage/myofascial release, and Injections  Classes of medications tried in the past: Acetaminophen, NSAIDs, Gabapentenoids, SNRIs , Muscle Relaxants, and Opioids      Review of Systems   13-point ROS done and negative except for HPI.      Current Outpatient Medications   Medication Instructions    abatacept (Orencia, with maltose,) 250 mg injection Every 28 days    aspirin 81 mg, oral, Daily    chlorhexidine (Peridex) 0.12 % solution 15 ml swish and spit for 30 seconds night prior to surgery and morning of surgery    cyclobenzaprine (FLEXERIL) 10 mg, oral, 3 times daily PRN    diclofenac sodium (VOLTAREN) 100 g, As needed    DULoxetine (CYMBALTA) 60 mg, oral, 2 times daily    ezetimibe (ZETIA) 10 mg, oral, Daily    leflunomide (Arava) 20 mg tablet 1 tablet DAILY (route: oral)    LORazepam (ATIVAN) 1-2 mg, oral, See admin instructions, Take 30 minutes prior to procedure.    metoprolol succinate XL (TOPROL-XL) 50 mg, oral, Daily    nystatin (Mycostatin) 100,000 unit/gram powder 1 Application, Topical, 2 times daily    omeprazole (PRILOSEC) 40 mg, oral, Daily    pilocarpine (SALAGEN) 5 mg, oral, 2 times daily    polyvinyl alcohol (Liquifilm Tears) 1.4 % ophthalmic solution 1-2 drops, 4 times daily    pregabalin (LYRICA) 100 mg, oral, 3 times daily    rosuvastatin (CRESTOR) 20 mg, oral, Daily    traMADol (ULTRAM) 50 mg, oral, Every 4 hours PRN    traZODone (DESYREL) 300 mg, oral, Nightly PRN    Vitamin D3 50 mcg, oral, Daily       Medical History[1]     Surgical History[2]     Family History[3]     RX Allergies[4]     Objective     There were no vitals filed for this visit.     Physical Exam  General: NAD, well groomed, well nourished, uses wheelchair for long distances  Eyes: Non-icteric sclera, EOMI  Ears, Nose, Mouth, and Throat: External ears and nose appear to be without deformity or rash. No lesions or masses noted. Hearing is grossly intact.   Neck: Trachea midline  Respiratory: Nonlabored breathing   Cardiovascular: no peripheral edema   Skin: No rashes or open lesions/ulcers identified on skin.    Back:   Palpation: No tenderness to palpation over lumbar paraspinous muscles.   Straight leg raise: positive at 50 degrees on the right      Hip:  Pain with internal/external rotation of the left hip    Neurologic:   Cranial nerves grossly intact.   Strength 5-/5 and symmetric plantar/dorsiflexion   Sensation: Normal to light touch throughout, pinprick  intact throughout.  DTRs:normal and symmetric throughout  Keith: absent    Psychiatric: Alert, orientation to person, place, and time. Cooperative.    Imaging personally reviewed and independently interpreted:   Cervical CT 2025  FINDINGS:  There is straightening of the cervical spine. Vertebral body and  intervertebral disc heights are maintained.      There is no osseous spinal canal stenosis.      There is mild right neural foraminal narrowing at C3-C4 due to  uncovertebral hypertrophy and moderate right neural foraminal  narrowing at C4-C5 due to uncovertebral hypertrophy. There is  bilateral uncovertebral hypertrophy at C5-C6 and at most mild right  neural foraminal narrowing. There is uncovertebral hypertrophy at  C6-C7 but there is no striking neural foraminal narrowing as seen on  CT.      There is no striking facet osteoarthropathy.      There is no prevertebral or retropharyngeal edema.      IMPRESSION:  Multilevel degenerative changes of the cervical spine including  variable degree of neural foraminal narrowing at multiple levels,  most pronounced on the right at C4-C5 and better evaluated on the  prior MRI.    Assessment/Plan   Cynthia is a 64-year-old female clinical picture consistent with lumbar postlaminectomy syndrome and cervical radicular pain.  For her low back pain with radicular symptoms, she would benefit from reprogramming with [employer].  Will help coordinate visit with [employer] rep.  For cervical radicular pain, she obtained a CT with worse pathology at C4-5.  She has a MRI ordered, [facility]  Consider ISAI after MRI.    She also has return of left hip pain, previously had left hip injection under fluoroscopy.  Will  schedule for repeat injection    Plan:  -Schedule left intra-articular hip injection under fluoroscopy.  Procedure, risks benefits, alternate therapies discussed.  -Will reach out to [facility] to help reprogram SCS  -Will evaluate cervical MRI after completed, consider ISAI.  Discussed that since the patient's PCP ordered the scan she will need to let us know when she has it completed via MyChart or by calling the office.    The patient has failed treatment with : Physical therapy , three or more classes of medications, alternative therapies, injections, have significant limitations of their quality of life due to the pain, have significant impairments of their activities of daily living (ADLs) due to the pain, and have significant impairments of their instrumental activities of daily living (IADLs) due to the pain    We discussed  the risks, benefits and alternatives of the procedure including but not limited to: , Epidural hematoma, Post-dural puncture headache, Lack of efficacy , Transiently worsening pain , Bleeding, Infection , and Nerve Damage    Follow up: As needed     The patient was invited to contact us back anytime with any questions or concerns and follow-up with us in the office as needed.     Diagnoses and all orders for this visit:  Postlaminectomy syndrome of lumbar region  Cervical radiculopathy    This note was generated with the aid of dictation software, there may be typos despite my attempts at proofreading.    Duc Ocasio DO  Pain Fellow         [1]   Past Medical History:  Diagnosis Date    Adverse effect of anesthesia     anaphylactic shock with appendectomy    Coronary artery disease     Fibromyalgia     GERD (gastroesophageal reflux disease)     under control    Hyperlipidemia     Hypertension     Old myocardial infarction 11/16/2020    Old myocardial infarct    Other overlap syndromes 10/10/2016    Mixed connective tissue disease    Rheumatoid arthritis     Sjogren syndrome, unspecified  (Multi)     Status post insertion of spinal cord stimulator    [2]   Past Surgical History:  Procedure Laterality Date    APPENDECTOMY  1972    Appendectomy    CT ABDOMEN PELVIS ANGIOGRAM W AND/OR WO IV CONTRAST  04/11/2019    CT ABDOMEN PELVIS ANGIOGRAM W AND/OR WO IV CONTRAST 4/11/2019 [facility] CLINICAL LEGACY    FOOT SURGERY Right     shace bone and internal hardware    LUMBAR LAMINECTOMY      MIDDLE EAR SURGERY Right 1968    mastoidectomy / tympanoplasty    ORIF ANKLE FRACTURE Left     OTHER SURGICAL HISTORY      removal vocal cord polyps    SPINAL CORD STIMULATOR IMPLANT      TYMPANOPLASTY     [3]   Family History  Problem Relation Name Age of Onset    Other (Cardiac abnormality) Mother  60    Hypothyroidism Mother      Intracerebral hemorrhage Mother      Colon cancer Father JK 60 - 69   [4]   Allergies  Allergen Reactions    Adalimumab Swelling    [place] Anaphylaxis    Amoxicillin-Pot Clavulanate Swelling and Angioedema    Cephalosporins Swelling and Unknown     swelling    Egg Anaphylaxis and Unknown    Flaxseed Anaphylaxis    Furosemide Anaphylaxis and Swelling    Loperamide Unknown and Angioedema    Methotrexate Swelling, Other and Unknown    Milnacipran Swelling    Neomycin-Polymyxin-Hc Swelling    Nut - Unspecified Anaphylaxis    Propofol Swelling and Other    Sulfa (Sulfonamide Antibiotics) Swelling    Sulfacetamide Swelling    Sulfamethoxazole Swelling and Other    Tree Nut Anaphylaxis    [place] Anaphylaxis    Capsaicin Hives and Unknown    Capsaicin-Menthol Unknown and Rash    Gabapentin Other and Dizziness

## 2025-05-02 ENCOUNTER — APPOINTMENT (OUTPATIENT)
Dept: PRIMARY CARE | Facility: CLINIC | Age: 65
End: 2025-05-02
Payer: MEDICAID

## 2025-05-02 ENCOUNTER — APPOINTMENT (OUTPATIENT)
Facility: HOSPITAL | Age: 65
End: 2025-05-02
Payer: MEDICAID

## 2025-05-03 ENCOUNTER — TELEPHONE (OUTPATIENT)
Dept: PRIMARY CARE | Facility: CLINIC | Age: 65
End: 2025-05-03
Payer: MEDICAID

## 2025-05-03 DIAGNOSIS — M50.30 DDD (DEGENERATIVE DISC DISEASE), CERVICAL: ICD-10-CM

## 2025-05-03 RX ORDER — LORAZEPAM 1 MG/1
1-2 TABLET ORAL SEE ADMIN INSTRUCTIONS
Qty: 10 TABLET | Refills: 0 | Status: SHIPPED | OUTPATIENT
Start: 2025-05-03 | End: 2025-06-02

## 2025-05-06 NOTE — H&P
Pain Management H&P    History Of Present Illness  Cynthia Cantrell is a 64 y.o. female presents for procedure state below. Endorses no changes in past medical history or medical health since last seen in clinic.      Past Medical History  She has a past medical history of Adverse effect of anesthesia, Coronary artery disease, Fibromyalgia, GERD (gastroesophageal reflux disease), Hyperlipidemia, Hypertension, Old myocardial infarction (11/16/2020), Other overlap syndromes (10/10/2016), Rheumatoid arthritis, Sjogren syndrome, unspecified (Multi), and Status post insertion of spinal cord stimulator.    Surgical History  She has a past surgical history that includes Appendectomy (1972); CT angio abdomen pelvis w and or wo IV IV contrast (04/11/2019); Other surgical history; ORIF ankle fracture (Left); Foot surgery (Right); Middle ear surgery (Right, 1968); Lumbar laminectomy; Spinal cord stimulator implant; and Tympanoplasty.     Social History  She reports that she has never smoked. She has never used smokeless tobacco. She reports that she does not drink alcohol and does not use drugs.    Family History  Family History[1]     Allergies  Adalimumab, [place], Amoxicillin-pot clavulanate, Cephalosporins, Egg, Flaxseed, Furosemide, Loperamide, Methotrexate, Milnacipran, Neomycin-polymyxin-hc, Nut - unspecified, Propofol, Sulfa (sulfonamide antibiotics), Sulfacetamide, Sulfamethoxazole, Tree nut, [place], Capsaicin, Capsaicin-menthol, and Gabapentin    Review of Symptoms:   Constitutional: Negative for chills, diaphoresis or fever  HENT: Negative for neck swelling  Eyes:.  Negative for eye pain  Respiratory:.  Negative for cough, shortness of breath or wheezing    Cardiovascular:.  Negative for chest pain or palpitations  Gastrointestinal:.  Negative for abdominal pain, nausea and vomiting  Genitourinary:.  Negative for urgency  Musculoskeletal:  Positive for left hip pain. Positive for joint pain. Denies falls  within the past 3 months.  Skin: Negative for wounds or itching   Neurological: Negative for dizziness, seizures, loss of consciousness and weakness  Endo/Heme/Allergies: Does not bruise/bleed easily  Psychiatric/Behavioral: Negative for depression. The patient does not appear anxious.      Pre-sedation Evaluation  ASA class 2  Mallampati score 2     PHYSICAL EXAM  Vitals signs reviewed  Constitutional:       General: Not in acute distress     Appearance: Normal appearance. Not ill-appearing.  HENT:     Head: Normocephalic and atraumatic  Eyes:     Conjunctiva/sclera: Conjunctivae normal  Cardiovascular:     Rate and Rhythm: Normal rate and regular rhythm  Pulmonary:     Effort: No respiratory distress  Abdominal:     Palpations: Abdomen is soft  Musculoskeletal: RANDHAWA  Skin:     General: Skin is warm and dry  Neurological:     General: No focal deficit present  Psychiatric:         Mood and Affect: Mood normal         Behavior: Behavior normal     Last Recorded Vitals  There were no vitals taken for this visit.    Relevant Results  Current Outpatient Medications   Medication Instructions    abatacept (Orencia, with maltose,) 250 mg injection Every 28 days    aspirin 81 mg, oral, Daily    chlorhexidine (Peridex) 0.12 % solution 15 ml swish and spit for 30 seconds night prior to surgery and morning of surgery    cyclobenzaprine (FLEXERIL) 10 mg, oral, 3 times daily PRN    diclofenac sodium (VOLTAREN) 100 g, As needed    DULoxetine (CYMBALTA) 60 mg, oral, 2 times daily    ezetimibe (ZETIA) 10 mg, oral, Daily    leflunomide (Arava) 20 mg tablet 1 tablet DAILY (route: oral)    LORazepam (ATIVAN) 1-2 mg, oral, See admin instructions, Take 30 minutes prior to procedure. Take no more than 2 mg per day. Do not take with your pain medication, take at least 8hours apart from your pain medication.    metoprolol succinate XL (TOPROL-XL) 50 mg, oral, Daily    nystatin (Mycostatin) 100,000 unit/gram powder 1 Application, Topical, 2  times daily    omeprazole (PRILOSEC) 40 mg, oral, Daily    pilocarpine (SALAGEN) 5 mg, oral, 2 times daily    polyvinyl alcohol (Liquifilm Tears) 1.4 % ophthalmic solution 1-2 drops, 4 times daily    pregabalin (LYRICA) 100 mg, oral, 3 times daily    rosuvastatin (CRESTOR) 20 mg, oral, Daily    traMADol (ULTRAM) 50 mg, oral, Every 4 hours PRN    traZODone (DESYREL) 300 mg, oral, Nightly PRN    Vitamin D3 50 mcg, oral, Daily         MR lumbar spine wo IV contrast 06/04/2023    Narrative  Interpreted By:  LORETTA ZABALA MD  MRN: [id number]  Patient Name: ANA SCRUGGS    STUDY:  MRI L-SPINE WO;  6/4/2023 1:05 pm    INDICATION:  pain  M54.16: Lumbar radiculopathy M47.816: Lumbar spondylosis.    COMPARISON:  03/10/2022    ACCESSION NUMBER(S):  [id number]    ORDERING CLINICIAN:  AR ARAUJO    TECHNIQUE:  Sagittal T2, sagittal STIR, sagittal T1, axial T2, axial T1 weighted  MRI images of the lumbar spine were obtained without intravenous  contrast administration.    FINDINGS:  There are postoperative changes compatible with a interval L4  laminectomy as well as resection of the L3 spinous process.    There is a small amount of fluid noted within the subcutaneous fat  along the midline surgical tract extending from the L2 through L4/5  levels suggestive of a postoperative seroma.    The coronal  images again demonstrate a dextrocurvature of the  lumbar spine and levocurvature of the visualized lower thoracic spine.    There is again evidence of 2-3 mm of anterolisthesis of L4 on L5 as  well as 2 mm of retrolisthesis of L1 on L2, L2 on L3, and L3 on L4.    Degenerative signal changes are again noted along endplates  throughout the lumbar and visualized lower thoracic region.    There is interval increased nonspecific abnormal bone marrow signal  noted along the endplates at the L4/5 level noted be prominently  increased in signal on the STIR images and diminished in signal on  the T1 weighted images left  greater than right which while  nonspecific is felt to likely be degenerative in origin although a  component of superimposed nonspecific bone marrow edema conceivably  posttraumatic or inflammatory in origin could give a similar MRI  appearance and therefore can not be completely excluded.    The visualized spinal cord demonstrates no signal abnormality within  it. The conus medullaris terminates at the inferior L2 level.    There is new nonspecific clumping of nerve roots of the cauda equina  within the lumbar and sacral region with the lobulated clumped nerve  roots of the cauda equina peripherally positioned within the thecal  sac within the lower lumbar/sacral region. While nonspecific, the  findings raise the possibility of underlying arachnoiditis.    At the L5/S1 level,  there is posterior osteophytic spurring and  posterior disc bulge along with degenerative facet changes  contributing to mild overall narrowing of the thecal sac within the  spinal canal. There is severe right and moderate left-sided neural  foraminal narrowing.    At the L4/L5 level,  there are postoperative changes compatible with  an interval L4 laminectomy. There is 2-3 mm of anterolisthesis of L4  on L5, a posterior disc bulge with superimposed posterior disc  herniation with extruded disc material extending cranially along the  posterior margin of the L4 vertebral body which in combination with  hypertrophic degenerative facet changes and ligamentum flavum  hypertrophy contributes to severe spinal canal narrowing there is  severe left and moderate right-sided neural foraminal narrowing.    At the L3/L4 level,  there is 2 mm of retrolisthesis of L3 on L4,  posterior osteophytic spurring, and a posterior disc bulge with a  superimposed left-sided disc herniation along with degenerative facet  changes contributing to mild overall narrowing of the thecal sac  within the spinal canal. There is severe left and moderate  right-sided neural  foraminal narrowing.    At the L2/L3 level,  there is 2 mm of retrolisthesis of L2 on L3,  posterior osteophytic spurring and posterior disc bulge along with  degenerative facet changes and ligamentum flavum hypertrophy. There  is mild prominence of the epidural fat posteriorly within the spinal  canal. The combination of findings contributes to moderate narrowing  of the thecal sac in the AP dimension within the spinal canal. There  is mild-to-moderate left and mild right-sided neural foraminal  narrowing.    At the L1/L2 level,  there is 2 mm of retrolisthesis of L1 on L2,  posterior osteophytic spurring, and a posterior disc bulge along with  degenerative facet changes contributing to mild spinal canal  narrowing. There is moderate to severe right and mild left-sided  neural foraminal narrowing.    At the T12/L1 level,  there is a right paracentral disc herniation  with extruded disc material extending cranially along the posterior  margin of the T12 vertebral body measuring approximally 4 mm in AP  dimension compared with 3 mm on the prior study dated 03/10/2022  along with mild degenerative facet changes. There is effacement of  right ventral subarachnoid space with minimal flattening the right  ventral margin of the spinal cord which is draped over the disc  herniation. There is mild encroachment upon the right neural foramen.  There is no significant left-sided neural foraminal narrowing.    At the T11/12 level, there is a left-sided disc herniation measuring  approximally 2-3 mm in AP dimension contributing to mild encroachment  upon the spinal canal. There is no significant neural foraminal  narrowing.    At the T10/11 level, the sagittal images demonstrate degenerative  facet changes left greater than right along with a mild posterior  disc. There is encroachment upon the neural foramen left greater than  right. No axial images were obtained through this level limiting  further  evaluation.    Impression  There are postoperative changes compatible with a interval L4  laminectomy as well as resection of the L3 spinous process. There is  a small amount of fluid noted within the subcutaneous fat along the  midline surgical tract extending from the L2 through L4/5 levels  suggestive of a postoperative seroma.    The coronal  images again demonstrate a dextrocurvature of the  lumbar spine and levocurvature of the visualized lower thoracic spine.    There is again evidence of 2-3 mm of anterolisthesis of L4 on L5 as  well as 2 mm of retrolisthesis of L1 on L2, L2 on L3, and L3 on L4.    There is interval increased nonspecific abnormal bone marrow signal  noted along the endplates at the L4/5 level noted be prominently  increased in signal on the STIR images and diminished in signal on  the T1 weighted images left greater than right which while  nonspecific is felt to likely be degenerative in origin although a  component of superimposed nonspecific bone marrow edema conceivably  posttraumatic or inflammatory in origin could give a similar MRI  appearance and therefore can not be completely excluded.    There is multilevel spondylosis. There are varying degrees of spinal  canal and neural foraminal narrowing as described above.    The study was interpreted at [facility].       ASSESSMENT/PLAN  Cynthia Cantrell is a 64 y.o. female here for left intra-articular hip injection under fluoroscopy    Patient denies any recent antibiotic use or infections, denies any blood thinner use, and denies contrast or local anesthetic allergies     Risks, benefits, alternatives discussed. All questions answered to the best of my ability. Patient agrees to proceed.      Our plan is as follows:  - Proceed with aforementioned procedure          DO DESMOND Manjarrez Pain fellow          [1]   Family History  Problem Relation Name Age of Onset    Other (Cardiac abnormality)  Mother  60    Hypothyroidism Mother      Intracerebral hemorrhage Mother      Colon cancer Father JK 60 - 69

## 2025-05-07 ENCOUNTER — HOSPITAL ENCOUNTER (OUTPATIENT)
Facility: HOSPITAL | Age: 65
Discharge: HOME | End: 2025-05-07
Payer: MEDICAID

## 2025-05-07 VITALS
SYSTOLIC BLOOD PRESSURE: 139 MMHG | DIASTOLIC BLOOD PRESSURE: 86 MMHG | RESPIRATION RATE: 16 BRPM | TEMPERATURE: 97.7 F | HEART RATE: 75 BPM | OXYGEN SATURATION: 96 %

## 2025-05-07 DIAGNOSIS — M25.552 PAIN OF LEFT HIP: ICD-10-CM

## 2025-05-07 PROCEDURE — 2500000004 HC RX 250 GENERAL PHARMACY W/ HCPCS (ALT 636 FOR OP/ED): Performed by: ANESTHESIOLOGY

## 2025-05-07 PROCEDURE — 20610 DRAIN/INJ JOINT/BURSA W/O US: CPT | Performed by: ANESTHESIOLOGY

## 2025-05-07 PROCEDURE — 2550000001 HC RX 255 CONTRASTS: Performed by: ANESTHESIOLOGY

## 2025-05-07 PROCEDURE — 20610 DRAIN/INJ JOINT/BURSA W/O US: CPT | Mod: LT | Performed by: ANESTHESIOLOGY

## 2025-05-07 PROCEDURE — 77002 NEEDLE LOCALIZATION BY XRAY: CPT | Performed by: ANESTHESIOLOGY

## 2025-05-07 RX ORDER — LIDOCAINE HYDROCHLORIDE 5 MG/ML
INJECTION, SOLUTION INFILTRATION; INTRAVENOUS
Status: COMPLETED | OUTPATIENT
Start: 2025-05-07 | End: 2025-05-07

## 2025-05-07 RX ORDER — METHYLPREDNISOLONE ACETATE 40 MG/ML
INJECTION, SUSPENSION INTRA-ARTICULAR; INTRALESIONAL; INTRAMUSCULAR; SOFT TISSUE
Status: COMPLETED | OUTPATIENT
Start: 2025-05-07 | End: 2025-05-07

## 2025-05-07 RX ORDER — MIDAZOLAM HYDROCHLORIDE 1 MG/ML
INJECTION, SOLUTION INTRAMUSCULAR; INTRAVENOUS
Status: COMPLETED | OUTPATIENT
Start: 2025-05-07 | End: 2025-05-07

## 2025-05-07 RX ORDER — BUPIVACAINE HYDROCHLORIDE 2.5 MG/ML
INJECTION, SOLUTION EPIDURAL; INFILTRATION; INTRACAUDAL; PERINEURAL
Status: COMPLETED | OUTPATIENT
Start: 2025-05-07 | End: 2025-05-07

## 2025-05-07 RX ADMIN — LIDOCAINE HYDROCHLORIDE 3 ML: 5 INJECTION, SOLUTION INFILTRATION at 13:49

## 2025-05-07 RX ADMIN — BUPIVACAINE HYDROCHLORIDE 3 ML: 2.5 INJECTION, SOLUTION EPIDURAL; INFILTRATION; INTRACAUDAL; PERINEURAL at 13:49

## 2025-05-07 RX ADMIN — MIDAZOLAM 2 MG: 1 INJECTION INTRAMUSCULAR; INTRAVENOUS at 13:48

## 2025-05-07 RX ADMIN — IOHEXOL 0.5 ML: 240 INJECTION, SOLUTION INTRATHECAL; INTRAVASCULAR; INTRAVENOUS; ORAL at 13:50

## 2025-05-07 RX ADMIN — METHYLPREDNISOLONE ACETATE 40 MG: 40 INJECTION, SUSPENSION INTRA-ARTICULAR; INTRALESIONAL; INTRAMUSCULAR; SOFT TISSUE at 13:50

## 2025-05-07 ASSESSMENT — PAIN SCALES - GENERAL
PAINLEVEL_OUTOF10: 7
PAINLEVEL_OUTOF10: 4
PAINLEVEL_OUTOF10: 5 - MODERATE PAIN
PAINLEVEL_OUTOF10: 5 - MODERATE PAIN

## 2025-05-07 ASSESSMENT — PAIN - FUNCTIONAL ASSESSMENT
PAIN_FUNCTIONAL_ASSESSMENT: 0-10
PAIN_FUNCTIONAL_ASSESSMENT: WONG-BAKER FACES
PAIN_FUNCTIONAL_ASSESSMENT: 0-10
PAIN_FUNCTIONAL_ASSESSMENT: 0-10

## 2025-05-07 NOTE — DISCHARGE INSTRUCTIONS
DISCHARGE INSTRUCTIONS FOR INJECTIONS     You underwent left intra-articular hip injection today    After most injections, it is recommended that you relax and limit your activity for the remainder of the day unless you have been told otherwise by your pain physician.  You should not drive a car, operate machinery, or make important legal decisions unless otherwise directed by your pain physician.  You may resume your normal activity, including exercise, tomorrow.      Keep a written pain diary of how much pain relief you experienced following the injection procedure and the length of time of pain relief you experienced pain relief. Following diagnostic injections like medial branch nerve blocks, sacroiliac joint blocks, stellate ganglion injections and other blocks, it is very important you record the specific amount of pain relief you experienced immediately after the injectionand how long it lasted. Your doctor will ask you for this information at your follow up visit.     For all injections, please keep the injection site dry and inspect the site for a couple of days. You may remove the Band-Aid the day of the injection at any time.     Some discomfort, bruising or slight swelling may occur at the injection site. This is not abnormal if it occurs.  If needed you may:    -Take over the counter medication such as Tylenol or Motrin.   -Apply an ice pack for 30 minutes, 2 to 3 times a day for the first 24 hours.     You may shower today; no soaking baths, hot tubs, whirlpools or swimming pools for two days.      If you are given steroids in your injection, it may take 3-5 days for the steroid medication to take effect. You may notice a worsening of your symptoms for 1-2 days after the injection. This is not abnormal.  You may use acetaminophen, ibuprofen, or prescription medication that your doctor may have prescribed for you if you need to do so.     A few common side effects of steroids include facial flushing,  sweating, restlessness, irritability,difficulty sleeping, increase in blood sugar, and increased blood pressure. If you have diabetes, please monitor your blood sugar at least once a day for at least 5 days. If you have poorly controlled high blood pressure, monitoryour blood pressure for at least 2 days and contact your primary care physician if these numbers are unusually high for you.      If you take aspirin or non-steroidal anti-inflammatory drugs (examples are Motrin, Advil, ibuprofen, Naprosyn, Voltaren, Relafen, etc.) you may restart these this evening, but stop taking it 3 days before your next appointment, unless instructed otherwiseby your physician.      You do not need to discontinue non-aspirin-containing pain medications prior to an injection (examples: Celebrex, tramadol, hydrocodone and acetaminophen).      If you take a blood thinning medication (Coumadin, Lovenox, Fragmin,Ticlid, Plavix, Pradaxa, etc.), please discuss this with your primary care physician/cardiologist and your pain physician. These medications MUST be discontinued before you can have an injection safely, without the risk of uncontrolled bleeding. If these medications are not discontinued for an appropriate period of time, you will not be able to receivean injection. Please adhere to instructions given to you about when to restart your blood thinning medication. If you have any questions please reach out to our team.    If you are taking Coumadin, please have your INR checked the morning of your procedure and bring the result to your appointment unless otherwise instructed. If your INR is over 1.2, your injection may need to be rescheduled to avoid uncontrolled bleeding from the needle placement.     Call [facility]  and ask for Pain Management at [phone] between 8am-4pm Monday - Friday if you are experiencing the following:    If you received an epidural or spinal injection:    -Headache that doesnot go away with medicine, is  worse when sitting or standing up, and is greatly relieved upon lying down.   -Severe pain worse than or different than your baseline pain.   -Chills or fever (101º F or greater).   -Drainage or signs of infection at the injection site     Go directly to the Emergency Department if you are experiencing the following and received an epidural or spinal injection:   -Abrupt weakness or progressive weakness in your legs that starts after you leave the clinic.   -Abrupt severe or worsening numbness in your legs.   -Inability to urinate after the injection or loss of bowel or bladder control without the urge to defecate or urinate.     If you have a clinical question that cannot wait until your next appointment, please call [phone] between 8am-4pm Monday - Friday or send a [employer] message. We do our best to return all non-emergency messages within 24 hours, Monday - Friday. A nurse or physician will return your message. You may also try calling Dr. Catrachito Momin's nurse ([phone]),  and they will do their best to answer your question(s).    If you need to cancel an appointment, please call the scheduling staff at [phone] during normal business hours or leave a message at least 24 hours in advance.     If you are going to be sedated for your next procedure, you MUST have responsible adult who can legally drive accompany you home. You cannot eat or drink for at least eight hours prior to the planned procedure if you are going to receive sedation. You may take your non-blood thinning medications with a small sip of water.

## 2025-05-09 ENCOUNTER — APPOINTMENT (OUTPATIENT)
Dept: RADIOLOGY | Facility: HOSPITAL | Age: 65
End: 2025-05-09
Payer: MEDICAID

## 2025-05-15 ENCOUNTER — OFFICE VISIT (OUTPATIENT)
Dept: PRIMARY CARE | Facility: CLINIC | Age: 65
End: 2025-05-15
Payer: MEDICAID

## 2025-05-15 VITALS
OXYGEN SATURATION: 98 % | BODY MASS INDEX: 25.52 KG/M2 | RESPIRATION RATE: 16 BRPM | SYSTOLIC BLOOD PRESSURE: 104 MMHG | DIASTOLIC BLOOD PRESSURE: 65 MMHG | WEIGHT: 144 LBS | TEMPERATURE: 96.3 F | HEIGHT: 63 IN | HEART RATE: 82 BPM

## 2025-05-15 DIAGNOSIS — Z59.41 FOOD INSECURITY: Primary | ICD-10-CM

## 2025-05-15 DIAGNOSIS — M50.30 DDD (DEGENERATIVE DISC DISEASE), CERVICAL: ICD-10-CM

## 2025-05-15 PROCEDURE — 3008F BODY MASS INDEX DOCD: CPT | Performed by: NURSE PRACTITIONER

## 2025-05-15 PROCEDURE — 99213 OFFICE O/P EST LOW 20 MIN: CPT | Performed by: NURSE PRACTITIONER

## 2025-05-15 PROCEDURE — 3074F SYST BP LT 130 MM HG: CPT | Performed by: NURSE PRACTITIONER

## 2025-05-15 PROCEDURE — 3078F DIAST BP <80 MM HG: CPT | Performed by: NURSE PRACTITIONER

## 2025-05-15 PROCEDURE — 1036F TOBACCO NON-USER: CPT | Performed by: NURSE PRACTITIONER

## 2025-05-15 RX ORDER — TRAMADOL HYDROCHLORIDE 50 MG/1
50 TABLET ORAL EVERY 4 HOURS PRN
Qty: 180 TABLET | Refills: 0 | Status: SHIPPED | OUTPATIENT
Start: 2025-05-15 | End: 2025-06-14

## 2025-05-15 RX ORDER — PREGABALIN 100 MG/1
100 CAPSULE ORAL 3 TIMES DAILY
Qty: 270 CAPSULE | Refills: 3 | Status: SHIPPED | OUTPATIENT
Start: 2025-05-15 | End: 2025-08-13

## 2025-05-15 SDOH — ECONOMIC STABILITY - FOOD INSECURITY: FOOD INSECURITY: Z59.41

## 2025-05-15 ASSESSMENT — PAIN SCALES - GENERAL: PAINLEVEL_OUTOF10: 5

## 2025-05-15 ASSESSMENT — ENCOUNTER SYMPTOMS
OCCASIONAL FEELINGS OF UNSTEADINESS: 1
LOSS OF SENSATION IN FEET: 0
DEPRESSION: 0

## 2025-05-15 NOTE — PROGRESS NOTES
"Subjective   Cynthia Cantrell is a 64 y.o. female who presents for Follow-up.  HPI  Here for med refill  Problem with getting her pain meds and lyrica outpatient  Scheduled for MRI last week  Doing fair  Moderate pain  She wants to do something about neck pain if it will benefit her  Trying to keep mobile with her dance and home therapies stretching etc    All systems reviewed. Review of systems negative except for noted positives in HPI    Objective     /65   Pulse 82   Temp 35.7 °C (96.3 °F)   Resp 16   Ht 1.6 m (5' 3\")   Wt 65.3 kg (144 lb)   LMP  (LMP Unknown)   SpO2 98%   BMI 25.51 kg/m²    Vital signs noted and reviewed.       Physical Exam  Vitals and nursing note reviewed.   Constitutional:       Appearance: Normal appearance.   HENT:      Head: Normocephalic and atraumatic.      Right Ear: Tympanic membrane normal.      Left Ear: Tympanic membrane normal.      Nose: Nose normal.      Mouth/Throat:      Mouth: Mucous membranes are moist.   Eyes:      Extraocular Movements: Extraocular movements intact.      Pupils: Pupils are equal, round, and reactive to light.   Cardiovascular:      Rate and Rhythm: Normal rate and regular rhythm.      Pulses: Normal pulses.      Heart sounds: Normal heart sounds.   Pulmonary:      Effort: Pulmonary effort is normal.      Breath sounds: Normal breath sounds.   Abdominal:      General: Bowel sounds are normal.      Palpations: Abdomen is soft.   Musculoskeletal:         General: Normal range of motion.      Cervical back: Normal range of motion and neck supple.   Skin:     General: Skin is dry.      Capillary Refill: Capillary refill takes 2 to 3 seconds.   Neurological:      Mental Status: She is alert and oriented to person, place, and time.   Psychiatric:         Behavior: Behavior normal.             Assessment/Plan   Problem List Items Addressed This Visit    None  Visit Diagnoses         Food insecurity    -  Primary    Relevant Orders    Referral " to [facility]      DDD (degenerative disc disease), cervical        Relevant Medications    traMADol (Ultram) 50 mg tablet    pregabalin (Lyrica) 100 mg capsule                  [place] reviewed   Has signed contrast  See back soon  Will up her after mri results are made available

## 2025-05-17 ENCOUNTER — APPOINTMENT (OUTPATIENT)
Dept: RADIOLOGY | Facility: HOSPITAL | Age: 65
End: 2025-05-17
Payer: MEDICAID

## 2025-05-17 DIAGNOSIS — M50.30 DDD (DEGENERATIVE DISC DISEASE), CERVICAL: ICD-10-CM

## 2025-05-17 PROCEDURE — 72141 MRI NECK SPINE W/O DYE: CPT | Performed by: RADIOLOGY

## 2025-05-17 PROCEDURE — 72141 MRI NECK SPINE W/O DYE: CPT

## 2025-05-27 ENCOUNTER — OFFICE VISIT (OUTPATIENT)
Dept: PRIMARY CARE | Facility: CLINIC | Age: 65
End: 2025-05-27
Payer: MEDICAID

## 2025-05-27 VITALS
DIASTOLIC BLOOD PRESSURE: 79 MMHG | TEMPERATURE: 98.6 F | SYSTOLIC BLOOD PRESSURE: 112 MMHG | HEART RATE: 95 BPM | OXYGEN SATURATION: 97 %

## 2025-05-27 DIAGNOSIS — R35.0 URINARY FREQUENCY: ICD-10-CM

## 2025-05-27 DIAGNOSIS — R30.0 DYSURIA: ICD-10-CM

## 2025-05-27 LAB
POC APPEARANCE, URINE: CLEAR
POC BILIRUBIN, URINE: NEGATIVE
POC BLOOD, URINE: ABNORMAL
POC COLOR, URINE: YELLOW
POC GLUCOSE, URINE: NEGATIVE MG/DL
POC KETONES, URINE: NEGATIVE MG/DL
POC LEUKOCYTES, URINE: ABNORMAL
POC NITRITE,URINE: POSITIVE
POC PH, URINE: 5.5 PH
POC PROTEIN, URINE: ABNORMAL MG/DL
POC SPECIFIC GRAVITY, URINE: >=1.03
POC UROBILINOGEN, URINE: 0.2 EU/DL

## 2025-05-27 PROCEDURE — 81003 URINALYSIS AUTO W/O SCOPE: CPT | Mod: QW | Performed by: STUDENT IN AN ORGANIZED HEALTH CARE EDUCATION/TRAINING PROGRAM

## 2025-05-27 PROCEDURE — 3074F SYST BP LT 130 MM HG: CPT | Performed by: STUDENT IN AN ORGANIZED HEALTH CARE EDUCATION/TRAINING PROGRAM

## 2025-05-27 PROCEDURE — 1036F TOBACCO NON-USER: CPT | Performed by: STUDENT IN AN ORGANIZED HEALTH CARE EDUCATION/TRAINING PROGRAM

## 2025-05-27 PROCEDURE — 99213 OFFICE O/P EST LOW 20 MIN: CPT | Performed by: STUDENT IN AN ORGANIZED HEALTH CARE EDUCATION/TRAINING PROGRAM

## 2025-05-27 PROCEDURE — 3078F DIAST BP <80 MM HG: CPT | Performed by: STUDENT IN AN ORGANIZED HEALTH CARE EDUCATION/TRAINING PROGRAM

## 2025-05-27 RX ORDER — CIPROFLOXACIN 500 MG/1
500 TABLET, FILM COATED ORAL 2 TIMES DAILY
Qty: 6 TABLET | Refills: 0 | Status: SHIPPED | OUTPATIENT
Start: 2025-05-27 | End: 2025-05-30

## 2025-05-27 SDOH — ECONOMIC STABILITY: FOOD INSECURITY: WITHIN THE PAST 12 MONTHS, THE FOOD YOU BOUGHT JUST DIDN'T LAST AND YOU DIDN'T HAVE MONEY TO GET MORE.: OFTEN TRUE

## 2025-05-27 SDOH — ECONOMIC STABILITY: FOOD INSECURITY: WITHIN THE PAST 12 MONTHS, YOU WORRIED THAT YOUR FOOD WOULD RUN OUT BEFORE YOU GOT MONEY TO BUY MORE.: OFTEN TRUE

## 2025-05-27 ASSESSMENT — PATIENT HEALTH QUESTIONNAIRE - PHQ9
SUM OF ALL RESPONSES TO PHQ9 QUESTIONS 1 & 2: 0
1. LITTLE INTEREST OR PLEASURE IN DOING THINGS: NOT AT ALL
2. FEELING DOWN, DEPRESSED OR HOPELESS: NOT AT ALL

## 2025-05-27 ASSESSMENT — LIFESTYLE VARIABLES: HOW OFTEN DO YOU HAVE A DRINK CONTAINING ALCOHOL: NEVER

## 2025-05-27 ASSESSMENT — ENCOUNTER SYMPTOMS
OCCASIONAL FEELINGS OF UNSTEADINESS: 0
LOSS OF SENSATION IN FEET: 0
DEPRESSION: 0

## 2025-05-27 ASSESSMENT — PAIN SCALES - GENERAL: PAINLEVEL_OUTOF10: 6

## 2025-05-27 NOTE — PROGRESS NOTES
Subjective   Patient ID: Cynthia Cantrell is a 64 y.o. female who presents for Follow-up (UTI).  History of Present Illness  The patient presents for evaluation of a UTI.    Concern for UTI  - Reports dysuria since Sunday afternoon  - Pain occurs during urination and occasionally persists afterwards  - Reports abnormal urine clarity  - Reports feeling feverish and experiencing chills at night  - Mild nocturnal perspiration  - No increase in urinary frequency  - Uncertain about back pain due to a pre-existing nerve stimulator  - Not sexually active  - No history of renal issues  - No nausea, vomiting, chest pain, or shortness of breath    Supplemental information: Past medical history of UTI 2 years ago, treated with nitrofurantoin initially and then transitioned to ciprofloxacin based on urine cultures  Objective     /79 (BP Location: Left arm, Patient Position: Lying, BP Cuff Size: Adult)   Pulse 95   Temp 37 °C (98.6 °F) (Skin)   LMP  (LMP Unknown)   SpO2 97%     Physical Exam  Vitals reviewed.   Constitutional:       General: She is not in acute distress.     Comments: Seated in wheelchair     HENT:      Head: Normocephalic and atraumatic.      Nose: Nose normal.   Eyes:      Extraocular Movements: Extraocular movements intact.      Pupils: Pupils are equal, round, and reactive to light.   Cardiovascular:      Rate and Rhythm: Normal rate and regular rhythm.      Pulses: Normal pulses.      Heart sounds: Normal heart sounds.   Pulmonary:      Effort: Pulmonary effort is normal. No respiratory distress.      Breath sounds: Normal breath sounds. No wheezing.   Abdominal:      General: Abdomen is flat. There is no distension.      Palpations: Abdomen is soft.      Tenderness: There is no right CVA tenderness or left CVA tenderness.   Skin:     General: Skin is warm and dry.   Neurological:      General: No focal deficit present.      Mental Status: She is alert and oriented to person, place, and  time.   Psychiatric:         Behavior: Behavior normal.         Judgment: Judgment normal.         Assessment & Plan  1. UTI  - IO Urine analysis: Positive nitrates and leukocyte esterase  - No significant back pain; no fever or chills noted  - Previous UTI treated with nitrofurantoin and ciprofloxacin, latter effective  - Prescribed ciprofloxacin 500 mg every 12 hours for 3 days  - Urine culture sent for further analysis  - Patient will be contacted via [facility] if concerns arise      This medical note was created with the assistance of artificial intelligence (AI) for documentation purposes. The content has been reviewed and confirmed by the healthcare provider for accuracy and completeness. Patient consented to the use of audio recording and use of AI during their visit.     Abel Mathis MD

## 2025-05-30 ENCOUNTER — CLINICAL SUPPORT (OUTPATIENT)
Dept: NUTRITION | Facility: CLINIC | Age: 65
End: 2025-05-30
Payer: MEDICAID

## 2025-05-30 DIAGNOSIS — M54.12 CERVICAL RADICULOPATHY: ICD-10-CM

## 2025-05-30 LAB — BACTERIA UR CULT: ABNORMAL

## 2025-05-30 NOTE — PROGRESS NOTES
[facility]  Diet Recommendation 1: Healthy Eating  Diet Recommendation 2: MyPlate  Food Intolerance Avoidance: Allergic to Tree Nuts, Eggs, Flaxseed, Celery, Bell Peppers  Nutrition Goals Stated: maintain healthy weight  Household Size: 1 Family Member  Interventions: Referral Number: 1st 6 Mo Referral 6 Mos  Interventions: Visit Number: 1 of 6 Visits - Max 6 Visits/Referral Each 6 Mo Period  Education Today: Healthy Recipes  Follow Up Notes for Future Visits: Interested in signing up for [employer] [facility]  Recipes Today: Greek Zucchini Salad, [place]  Grains: 0-25% Whole  Fruit: 25-50% Fresh  Vegetables: Fresh - 100%  Proteins: 0 Plant-based Items  Dairy: Lowfat - 100%  Originating Site of Referral Order: [facility] [place]-CNP  Initials of RD Assisting Today: MB

## 2025-06-03 ENCOUNTER — HOSPITAL ENCOUNTER (OUTPATIENT)
Dept: CARDIOLOGY | Facility: HOSPITAL | Age: 65
Discharge: HOME | End: 2025-06-03
Payer: MEDICAID

## 2025-06-03 ENCOUNTER — OFFICE VISIT (OUTPATIENT)
Dept: CARDIOLOGY | Facility: HOSPITAL | Age: 65
End: 2025-06-03
Payer: MEDICAID

## 2025-06-03 ENCOUNTER — APPOINTMENT (OUTPATIENT)
Dept: PRIMARY CARE | Facility: CLINIC | Age: 65
End: 2025-06-03
Payer: MEDICAID

## 2025-06-03 VITALS
BODY MASS INDEX: 25.52 KG/M2 | SYSTOLIC BLOOD PRESSURE: 122 MMHG | DIASTOLIC BLOOD PRESSURE: 84 MMHG | OXYGEN SATURATION: 96 % | HEART RATE: 81 BPM | WEIGHT: 144 LBS | HEIGHT: 63 IN

## 2025-06-03 DIAGNOSIS — E78.00 PURE HYPERCHOLESTEROLEMIA: Primary | ICD-10-CM

## 2025-06-03 DIAGNOSIS — I21.4 NSTEMI (NON-ST ELEVATED MYOCARDIAL INFARCTION) (MULTI): ICD-10-CM

## 2025-06-03 DIAGNOSIS — Z00.00 HEALTHCARE MAINTENANCE: ICD-10-CM

## 2025-06-03 DIAGNOSIS — I25.10 CORONARY ARTERIOSCLEROSIS IN NATIVE ARTERY: ICD-10-CM

## 2025-06-03 DIAGNOSIS — M05.79 RHEUMATOID ARTHRITIS INVOLVING MULTIPLE SITES WITH POSITIVE RHEUMATOID FACTOR (MULTI): ICD-10-CM

## 2025-06-03 PROCEDURE — 3074F SYST BP LT 130 MM HG: CPT | Performed by: INTERNAL MEDICINE

## 2025-06-03 PROCEDURE — 99214 OFFICE O/P EST MOD 30 MIN: CPT | Performed by: INTERNAL MEDICINE

## 2025-06-03 PROCEDURE — 3079F DIAST BP 80-89 MM HG: CPT | Performed by: INTERNAL MEDICINE

## 2025-06-03 PROCEDURE — 3008F BODY MASS INDEX DOCD: CPT | Performed by: INTERNAL MEDICINE

## 2025-06-03 PROCEDURE — 99213 OFFICE O/P EST LOW 20 MIN: CPT | Performed by: INTERNAL MEDICINE

## 2025-06-03 PROCEDURE — 1036F TOBACCO NON-USER: CPT | Performed by: INTERNAL MEDICINE

## 2025-06-03 NOTE — LETTER
Damaris 3, 2025     MEERA Reno-CNP  [address]  [facility] [address]    Patient: Cynthia Cantrell   YOB: 1960   Date of Visit: 6/3/2025       Dear MEERA Sales-CNP:    Thank you for referring Cynthia Cantrell to me for evaluation. Below are my notes for this consultation.  If you have questions, please do not hesitate to call me. I look forward to following your patient along with you.       Sincerely,     Rebecca Larson MD      CC: NAN Reno  ______________________________________________________________________________________    06/03/25  2:11 PM    Cardiovascular Medicine    This terrific 64 year-old woman is sen in f/u of CAD with NSTEMI 4.2019 and LASHAE in RCA (3.5 x 38 mm Bethanie stent) she had mild coronary artery disease elsewhere. She has normal LVEF. She has HTN and hyperlipidemia and mixed connective tissue disorder.     Since I saw her last in the fall, she has not had chest pain, dyspnea, palpitations, dizziness. No bleeding on aspirin.    Her spinal cord stimulatory has helped her, but she has a persistent tightness at her right ankle.    Swelling better than in the past; she wears compression.      She is tolerating her meds; I added ezetimibe to rosuva 20 for LDL 79 mg/dL at last visit.    Past Medical History:   Diagnosis Date   • Adverse effect of anesthesia     anaphylactic shock with appendectomy   • Coronary artery disease    • Fibromyalgia    • GERD (gastroesophageal reflux disease)     under control   • Hyperlipidemia    • Hypertension    • Old myocardial infarction 11/16/2020    Old myocardial infarct   • Other overlap syndromes 10/10/2016    Mixed connective tissue disease   • Rheumatoid arthritis    • Sjogren syndrome, unspecified (Multi)    • Status post insertion of spinal cord stimulator      Patient Active Problem List   Diagnosis   • Acquired pes planus of right foot   • Acquired  spondylolisthesis   • Allergic reaction to food   • Ankle fracture, left   • Chest pain   • Arthritis of left hip   • Arthritis of midfoot   • Astigmatism of both eyes   • Bimalleolar fracture   • Brow ptosis, bilateral   • Clavicle enlargement   • Conductive hearing loss of right ear with unrestricted hearing of left ear   • Coronary arteriosclerosis in native artery   • Dark stools   • Decreased visual acuity   • Dermatochalasis of left upper eyelid   • Dermatochalasis of right upper eyelid   • Dry eye syndrome of bilateral lacrimal glands   • Dysuria   • Familial porphyria cutanea tarda (Multi)   • Chronic back pain   • Chronic pain syndrome   • Fibromyalgia   • Fluid collection of middle ear   • Fracture of foot, closed   • GERD (gastroesophageal reflux disease)   • History of acute otitis externa   • History of heart artery stent   • Hyperlipidemia   • Mixed hyperlipidemia   • Hypertension   • Insomnia   • Hyperopia with presbyopia of both eyes   • Irregular menses   • Low vitamin D level   • Lumbar radiculopathy   • Lumbar spondylosis   • Cervical radiculitis   • Lumbosacral neuritis   • Lumbosacral stenosis   • Medial epicondylitis of left elbow   • Melanocytic nevi of trunk   • Melanocytic nevi of other parts of face   • Hemangioma of skin and subcutaneous tissue   • Multiple nevi   • Muscle spasm   • Myocardial infarct (Multi)   • NSTEMI (non-ST elevated myocardial infarction) (Multi)   • Myopia, bilateral   • Neoplasm of unspecified behavior of bone, soft tissue, and skin   • Other seborrheic keratosis   • Other specified systemic involvement of connective tissue   • Otitis media of right ear with rupture of tympanic membrane   • Plantar wart of right foot   • Positive sm/RNP antibody   • Post-traumatic headache, not intractable   • Ptosis of both eyelids   • Rash and other nonspecific skin eruption   • Mixed collagen vascular disease (Multi)   • Rheumatoid arthritis   • Sjogrens syndrome   • Right upper  quadrant abdominal pain of unknown etiology   • Seasonal allergies   • Sicca syndrome (Multi)   • Other hypertrophic disorders of the skin   • Subjective tinnitus, right   • Urinary frequency   • Vitamin D deficiency   • Lumbar stenosis with neurogenic claudication   • Essential hypertension     Current Outpatient Medications   Medication Sig Dispense Refill   • abatacept (Orencia, with maltose,) 250 mg injection Infuse into a venous catheter every 28 (twenty-eight) days.     • aspirin 81 mg EC tablet Take 1 tablet (81 mg) by mouth once daily. 90 tablet 3   • chlorhexidine (Peridex) 0.12 % solution 15 ml swish and spit for 30 seconds night prior to surgery and morning of surgery 473 mL 0   • cyclobenzaprine (Flexeril) 10 mg tablet Take 1 tablet (10 mg) by mouth 3 times a day as needed for muscle spasms. 270 tablet 3   • diclofenac sodium (Voltaren) 1 % gel Place 112.5 inches (100 g) on the skin if needed.     • DULoxetine (Cymbalta) 60 mg DR capsule Take 1 capsule (60 mg) by mouth 2 times a day. 200 capsule 3   • ezetimibe (Zetia) 10 mg tablet Take 1 tablet (10 mg) by mouth once daily. 90 tablet 3   • leflunomide (Arava) 20 mg tablet 1 tablet DAILY (route: oral) 90 tablet 2   • LORazepam (Ativan) 1 mg tablet Take 1-2 tablets (1-2 mg) by mouth see administration instructions. Take 30 minutes prior to procedure. Take no more than 2 mg per day. Do not take with your pain medication, take at least 8hours apart from your pain medication. 10 tablet 0   • metoprolol succinate XL (Toprol-XL) 50 mg 24 hr tablet Take 1 tablet (50 mg) by mouth once daily. 90 tablet 3   • nystatin (Mycostatin) 100,000 unit/gram powder Apply 1 Application topically 2 times a day. 60 g 3   • omeprazole (PriLOSEC) 40 mg DR capsule Take 1 capsule (40 mg) by mouth once daily. 90 capsule 3   • pilocarpine (Salagen) 5 mg tablet Take 1 tablet (5 mg) by mouth 2 times a day. 180 tablet 2   • polyvinyl alcohol (Liquifilm Tears) 1.4 % ophthalmic solution  "Administer 1-2 drops into affected eye(s) 4 times a day.     • pregabalin (Lyrica) 100 mg capsule Take 1 capsule (100 mg) by mouth 3 times a day. 270 capsule 3   • rosuvastatin (Crestor) 20 mg tablet Take 1 tablet (20 mg) by mouth once daily. 90 tablet 3   • traMADol (Ultram) 50 mg tablet Take 1 tablet (50 mg) by mouth every 4 hours if needed for severe pain (7 - 10). 180 tablet 0   • traZODone (Desyrel) 100 mg tablet Take 3 tablets (300 mg) by mouth as needed at bedtime for sleep (1 to 3 tablets at night (100 mg to 300 mg at night as needed)) for up to 270 doses. 270 tablet 2   • Vitamin D3 50 mcg (2,000 unit) capsule Take 1 capsule (50 mcg) by mouth once daily. 90 capsule 3     No current facility-administered medications for this visit.     BP fine at home    Physical exam  Patient Vitals for the past 24 hrs:   BP Pulse SpO2 Height Weight   06/03/25 1323 122/84 81 -- -- --   06/03/25 1319 121/80 82 96 % 1.6 m (5' 3\") 65.3 kg (144 lb)   Weight is down 11# from last visit  She is in no distress  HEENT OK  JVP flat  +S1, S2, RRR; I don't hear murmur  Clear lungs  Abd soft, benign, no bruits  Brisk pulses at ankle and also normal, triphasic Doppler signals  No edema  She is alert, oriented  Fluid speech appropriate affect    9.2024 Lipids  Tchol 172  HDL 74  LDL 79  Trig 94 mg/dL    3.2025 Labs  Cr 0.67  K 4.4  Na 139    Today's ECG 80 beats per minute  No infarct/ischemic changes    11.2022 Stress test - Dobutamine Echo, no ischemia  Summary:  1. The resting ejection fraction was estimated at 70 to 75% with a peak exercise ejection fraction estimated at >75%.  2. Hyperdynamic global left ventricular systolic function.  3. No clinical, echocardiographic or electrocardiographic evidence for ischemia at a maximal infusion.  4. No ECG changes from baseline.  5. Normal Stress Test.     [id number] Bneton Muro MD  Electronically signed on 11/15/2022 at 6:12:24 PM    Assessment/Plan: 63-year-old woman seen in f/u of CAD. She " "has a history of drug-eluting stent placement in the right coronary artery in 2019 for NSTEMI. She is now on single agent aspirin. She is doing fine from a cardiac perspective without angina or HF sx. She is tolerating aspirin.  Palpitations better than in the past.    She has lost some weight. Great!      Her right ankle pain is unlikely to be vasculogenic; arterial assessment normal and no swelling today. I asked her to d/w her rheumatologist.    I\"d like to see repeat lipids with addition of ezetimibe 10 mg/day to rosuvastatin 20 mg/day; could not increase rosuvastatin further due to medication interactions with her RA Rx.    RTC 9 months or sooner PRN.    Rebecca Larson MD    "

## 2025-06-03 NOTE — PROGRESS NOTES
06/03/25  2:11 PM    Cardiovascular Medicine    This terrific 64 year-old woman is sen in f/u of CAD with NSTEMI 4.2019 and LASHAE in RCA (3.5 x 38 mm Bethanie stent) she had mild coronary artery disease elsewhere. She has normal LVEF. She has HTN and hyperlipidemia and mixed connective tissue disorder.     Since I saw her last in the fall, she has not had chest pain, dyspnea, palpitations, dizziness. No bleeding on aspirin.    Her spinal cord stimulatory has helped her, but she has a persistent tightness at her right ankle.    Swelling better than in the past; she wears compression.      She is tolerating her meds; I added ezetimibe to rosuva 20 for LDL 79 mg/dL at last visit.    Past Medical History:   Diagnosis Date    Adverse effect of anesthesia     anaphylactic shock with appendectomy    Coronary artery disease     Fibromyalgia     GERD (gastroesophageal reflux disease)     under control    Hyperlipidemia     Hypertension     Old myocardial infarction 11/16/2020    Old myocardial infarct    Other overlap syndromes 10/10/2016    Mixed connective tissue disease    Rheumatoid arthritis     Sjogren syndrome, unspecified (Multi)     Status post insertion of spinal cord stimulator      Patient Active Problem List   Diagnosis    Acquired pes planus of right foot    Acquired spondylolisthesis    Allergic reaction to food    Ankle fracture, left    Chest pain    Arthritis of left hip    Arthritis of midfoot    Astigmatism of both eyes    Bimalleolar fracture    Brow ptosis, bilateral    Clavicle enlargement    Conductive hearing loss of right ear with unrestricted hearing of left ear    Coronary arteriosclerosis in native artery    Dark stools    Decreased visual acuity    Dermatochalasis of left upper eyelid    Dermatochalasis of right upper eyelid    Dry eye syndrome of bilateral lacrimal glands    Dysuria    Familial porphyria cutanea tarda (Multi)    Chronic back pain    Chronic pain syndrome    Fibromyalgia    Fluid  collection of middle ear    Fracture of foot, closed    GERD (gastroesophageal reflux disease)    History of acute otitis externa    History of heart artery stent    Hyperlipidemia    Mixed hyperlipidemia    Hypertension    Insomnia    Hyperopia with presbyopia of both eyes    Irregular menses    Low vitamin D level    Lumbar radiculopathy    Lumbar spondylosis    Cervical radiculitis    Lumbosacral neuritis    Lumbosacral stenosis    Medial epicondylitis of left elbow    Melanocytic nevi of trunk    Melanocytic nevi of other parts of face    Hemangioma of skin and subcutaneous tissue    Multiple nevi    Muscle spasm    Myocardial infarct (Multi)    NSTEMI (non-ST elevated myocardial infarction) (Multi)    Myopia, bilateral    Neoplasm of unspecified behavior of bone, soft tissue, and skin    Other seborrheic keratosis    Other specified systemic involvement of connective tissue    Otitis media of right ear with rupture of tympanic membrane    Plantar wart of right foot    Positive sm/RNP antibody    Post-traumatic headache, not intractable    Ptosis of both eyelids    Rash and other nonspecific skin eruption    Mixed collagen vascular disease (Multi)    Rheumatoid arthritis    Sjogrens syndrome    Right upper quadrant abdominal pain of unknown etiology    Seasonal allergies    Sicca syndrome (Multi)    Other hypertrophic disorders of the skin    Subjective tinnitus, right    Urinary frequency    Vitamin D deficiency    Lumbar stenosis with neurogenic claudication    Essential hypertension     Current Outpatient Medications   Medication Sig Dispense Refill    abatacept (Orencia, with maltose,) 250 mg injection Infuse into a venous catheter every 28 (twenty-eight) days.      aspirin 81 mg EC tablet Take 1 tablet (81 mg) by mouth once daily. 90 tablet 3    chlorhexidine (Peridex) 0.12 % solution 15 ml swish and spit for 30 seconds night prior to surgery and morning of surgery 473 mL 0    cyclobenzaprine (Flexeril) 10  mg tablet Take 1 tablet (10 mg) by mouth 3 times a day as needed for muscle spasms. 270 tablet 3    diclofenac sodium (Voltaren) 1 % gel Place 112.5 inches (100 g) on the skin if needed.      DULoxetine (Cymbalta) 60 mg DR capsule Take 1 capsule (60 mg) by mouth 2 times a day. 200 capsule 3    ezetimibe (Zetia) 10 mg tablet Take 1 tablet (10 mg) by mouth once daily. 90 tablet 3    leflunomide (Arava) 20 mg tablet 1 tablet DAILY (route: oral) 90 tablet 2    LORazepam (Ativan) 1 mg tablet Take 1-2 tablets (1-2 mg) by mouth see administration instructions. Take 30 minutes prior to procedure. Take no more than 2 mg per day. Do not take with your pain medication, take at least 8hours apart from your pain medication. 10 tablet 0    metoprolol succinate XL (Toprol-XL) 50 mg 24 hr tablet Take 1 tablet (50 mg) by mouth once daily. 90 tablet 3    nystatin (Mycostatin) 100,000 unit/gram powder Apply 1 Application topically 2 times a day. 60 g 3    omeprazole (PriLOSEC) 40 mg DR capsule Take 1 capsule (40 mg) by mouth once daily. 90 capsule 3    pilocarpine (Salagen) 5 mg tablet Take 1 tablet (5 mg) by mouth 2 times a day. 180 tablet 2    polyvinyl alcohol (Liquifilm Tears) 1.4 % ophthalmic solution Administer 1-2 drops into affected eye(s) 4 times a day.      pregabalin (Lyrica) 100 mg capsule Take 1 capsule (100 mg) by mouth 3 times a day. 270 capsule 3    rosuvastatin (Crestor) 20 mg tablet Take 1 tablet (20 mg) by mouth once daily. 90 tablet 3    traMADol (Ultram) 50 mg tablet Take 1 tablet (50 mg) by mouth every 4 hours if needed for severe pain (7 - 10). 180 tablet 0    traZODone (Desyrel) 100 mg tablet Take 3 tablets (300 mg) by mouth as needed at bedtime for sleep (1 to 3 tablets at night (100 mg to 300 mg at night as needed)) for up to 270 doses. 270 tablet 2    Vitamin D3 50 mcg (2,000 unit) capsule Take 1 capsule (50 mcg) by mouth once daily. 90 capsule 3     No current facility-administered medications for this  "visit.     BP fine at home    Physical exam  Patient Vitals for the past 24 hrs:   BP Pulse SpO2 Height Weight   06/03/25 1323 122/84 81 -- -- --   06/03/25 1319 121/80 82 96 % 1.6 m (5' 3\") 65.3 kg (144 lb)   Weight is down 11# from last visit  She is in no distress  HEENT OK  JVP flat  +S1, S2, RRR; I don't hear murmur  Clear lungs  Abd soft, benign, no bruits  Brisk pulses at ankle and also normal, triphasic Doppler signals  No edema  She is alert, oriented  Fluid speech appropriate affect    9.2024 Lipids  Tchol 172  HDL 74  LDL 79  Trig 94 mg/dL    3.2025 Labs  Cr 0.67  K 4.4  Na 139    Today's ECG 80 beats per minute  No infarct/ischemic changes    11.2022 Stress test - Dobutamine Echo, no ischemia  Summary:  1. The resting ejection fraction was estimated at 70 to 75% with a peak exercise ejection fraction estimated at >75%.  2. Hyperdynamic global left ventricular systolic function.  3. No clinical, echocardiographic or electrocardiographic evidence for ischemia at a maximal infusion.  4. No ECG changes from baseline.  5. Normal Stress Test.     [id number] Benton Muro MD  Electronically signed on 11/15/2022 at 6:12:24 PM    Assessment/Plan: 63-year-old woman seen in f/u of CAD. She has a history of drug-eluting stent placement in the right coronary artery in 2019 for NSTEMI. She is now on single agent aspirin. She is doing fine from a cardiac perspective without angina or HF sx. She is tolerating aspirin.  Palpitations better than in the past.    She has lost some weight. Great!      Her right ankle pain is unlikely to be vasculogenic; arterial assessment normal and no swelling today. I asked her to d/w her rheumatologist.    I\"d like to see repeat lipids with addition of ezetimibe 10 mg/day to rosuvastatin 20 mg/day; could not increase rosuvastatin further due to medication interactions with her RA Rx.    RTC 9 months or sooner PRN.    Rebecca Larson MD    "

## 2025-06-04 PROCEDURE — 93005 ELECTROCARDIOGRAM TRACING: CPT

## 2025-06-06 LAB
ATRIAL RATE: 80 BPM
P AXIS: 0 DEGREES
P OFFSET: 185 MS
P ONSET: 152 MS
PR INTERVAL: 160 MS
Q ONSET: 232 MS
QRS COUNT: 13 BEATS
QRS DURATION: 64 MS
QT INTERVAL: 352 MS
QTC CALCULATION(BAZETT): 405 MS
QTC FREDERICIA: 387 MS
R AXIS: -12 DEGREES
T AXIS: 28 DEGREES
T OFFSET: 408 MS
VENTRICULAR RATE: 80 BPM

## 2025-06-13 ENCOUNTER — TELEMEDICINE (OUTPATIENT)
Dept: PRIMARY CARE | Facility: CLINIC | Age: 65
End: 2025-06-13
Payer: MEDICAID

## 2025-06-13 DIAGNOSIS — G47.00 INSOMNIA, UNSPECIFIED TYPE: ICD-10-CM

## 2025-06-13 DIAGNOSIS — Z99.3 DEPENDENT FOR WHEELCHAIR MOBILITY: Primary | ICD-10-CM

## 2025-06-13 DIAGNOSIS — M50.30 DDD (DEGENERATIVE DISC DISEASE), CERVICAL: ICD-10-CM

## 2025-06-13 PROCEDURE — 99213 OFFICE O/P EST LOW 20 MIN: CPT | Performed by: NURSE PRACTITIONER

## 2025-06-13 RX ORDER — PREGABALIN 100 MG/1
100 CAPSULE ORAL 3 TIMES DAILY
Qty: 270 CAPSULE | Refills: 3 | Status: SHIPPED | OUTPATIENT
Start: 2025-06-13 | End: 2025-09-11

## 2025-06-13 RX ORDER — TRAMADOL HYDROCHLORIDE 50 MG/1
50 TABLET, FILM COATED ORAL EVERY 4 HOURS PRN
Qty: 180 TABLET | Refills: 0 | Status: SHIPPED | OUTPATIENT
Start: 2025-06-13 | End: 2025-07-13

## 2025-06-13 NOTE — PROGRESS NOTES
Subjective   Cynthia Cantrell is a 64 y.o. female who presents for No chief complaint on file..  HPI  Needs pain meds  Referral for occupational therapy  Feels losing some mobility due to DDD and DJD  Also needs home health aid son is leaving and will need help with house hold chores and also may need assistance in other activities  Is struggling with her hands, RA infusions help but 3 week into them has more pain    All systems reviewed. Review of systems negative except for noted positives in HPI    Objective     LMP  (LMP Unknown)    Vital signs noted and reviewed.       Physical Exam  Alert oriented x 3 speech is clear        Assessment/Plan   Problem List Items Addressed This Visit          Sleep    Insomnia     Other Visit Diagnoses         Dependent for wheelchair mobility    -  Primary    Relevant Orders    Referral to Home Care      DDD (degenerative disc disease), cervical        Relevant Medications    pregabalin (Lyrica) 100 mg capsule    traMADol (Ultram) 50 mg tablet    Other Relevant Orders    Referral to Occupational Therapy    Referral to [facility]    Referral to Home Care                  [facility] reviewed has pain contract  Reviewed meds and pain contract  Referral for [facility] and OT, home care    See back in one month

## 2025-06-14 ENCOUNTER — DOCUMENTATION (OUTPATIENT)
Dept: HOME HEALTH SERVICES | Facility: HOME HEALTH | Age: 65
End: 2025-06-14
Payer: MEDICAID

## 2025-06-14 ENCOUNTER — TELEPHONE (OUTPATIENT)
Dept: HOME HEALTH SERVICES | Facility: HOME HEALTH | Age: 65
End: 2025-06-14
Payer: MEDICAID

## 2025-06-14 NOTE — HH CARE COORDINATION
This referral has been made a Non Admit with  Home Care due to No Skilled Disciplines Ordered. If you have further questions, feel free to reach out to our office at [phone]. Thank you, [facility] Intake.    Provider Notification:  Sent In Basket to PCP MEERA Reno-CNP informing that [facility] has not accepted patient referral.

## 2025-06-14 NOTE — TELEPHONE ENCOUNTER
Unfortunately, [facility] does not provide non-skilled services. Due to lack of skilled needs, we have made the referral for Cynthia Cantrell  a Non-Admit. If the patient requires skilled services (SN, PT, ST), please resubmit the referral indicating the patient's needs. Other services (OT, HHA, MSW, RD) can be added if one of these qualifying services are ordered. Thank you.

## 2025-06-23 ENCOUNTER — APPOINTMENT (OUTPATIENT)
Facility: HOSPITAL | Age: 65
End: 2025-06-23
Payer: MEDICAID

## 2025-06-26 ENCOUNTER — APPOINTMENT (OUTPATIENT)
Dept: NUTRITION | Facility: CLINIC | Age: 65
End: 2025-06-26
Payer: MEDICAID

## 2025-06-30 DIAGNOSIS — M06.9 RHEUMATOID ARTHRITIS INVOLVING MULTIPLE SITES, UNSPECIFIED WHETHER RHEUMATOID FACTOR PRESENT (MULTI): ICD-10-CM

## 2025-06-30 NOTE — TELEPHONE ENCOUNTER
Prescription Request for Leflunomide        Has The Patient Been Identified By Name And Date Of Birth: yes    RX Requestor: patient    Date of Last Refill: 8/30/24    Date Of Last Office Visit: 2/24/25    Date Of Future Office Visit: 8/25/25

## 2025-07-01 RX ORDER — LEFLUNOMIDE 20 MG/1
TABLET ORAL
Qty: 90 TABLET | Refills: 2 | Status: SHIPPED | OUTPATIENT
Start: 2025-07-01

## 2025-07-02 ENCOUNTER — EVALUATION (OUTPATIENT)
Dept: OCCUPATIONAL THERAPY | Facility: HOSPITAL | Age: 65
End: 2025-07-02
Payer: MEDICAID

## 2025-07-02 DIAGNOSIS — M50.30 DDD (DEGENERATIVE DISC DISEASE), CERVICAL: Primary | ICD-10-CM

## 2025-07-02 PROCEDURE — 97167 OT EVAL HIGH COMPLEX 60 MIN: CPT | Mod: GO

## 2025-07-02 ASSESSMENT — ENCOUNTER SYMPTOMS
DEPRESSION: 0
OCCASIONAL FEELINGS OF UNSTEADINESS: 1
LOSS OF SENSATION IN FEET: 0

## 2025-07-02 NOTE — PROGRESS NOTES
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 The median nerve distribution paresthesia stems from her cervical radiculopathy. She reports constant moderate pain/paresthesia of her dominant right hand, and mild versions of the same that are occurring intermittently in the left hand at night. She demonstrates slightly limited composite right finger flexion and bilateral hand weakness, which along with the pain/paresthesia, make I/ADL difficult. I got her started on a home program that addresses these issues. She will benefit from ongoing skilled occupational therapy in order to get back to safe I/ADL performance.     Plan therapeutic exercise, therapeutic activity, self-care, home management, manual therapy, neuromuscular coordination, vasopneumatic (BFR), electric stimulation (TENS), fluidotherapy, orthosis fabrication, edema care  Goals  In 8-10 weeks, Cynthia will achieve the following goals:  She will be able to perform self-care, household, and leisure tasks with lower pain/paresthesia (1-3/10), 50% of the time.  She will improve R hand motion to full fisting in order to perform self-care, household, and leisure tasks.  She will improve bilateral  strength in order to fully  perform self-care, household, and leisure tasks.  She will increase fine motor strength of bilateral hands by at increasing her bilateral pinch strength by at least 5 lbs in order to improve her motor performance when cutting food and opening jars/caps/lids.  She will decrease her QuickDASH raw score by 15-25 points (39 at eval), signifying decreasing difficulty with performance of daily occupations.  Patient's goals for therapy: Would like to learn how to most efficiently sit first then dismantle the WC while sitting and put it in the car.    Plan of care was developed with input and agreement by the patient  Frequency: 1 x Week  Duration: 12 Weeks    Precautions  Movement Restrictions: No:  Weight Bearing Status: As tolerated    Subjective  onset March 2025  Presents in manual dancing  that she purchased herself. She dances with [employer] and performs ballroom dancing with standing partner. Also learning a form of martial art. Also has power chair.  Has nerve stimulator in lower back which helps with pain and allows her to stand for 5 min allowing her to cook sometimes and transfer from wheelchair to car and to get into [facility] center which is not  accesible, and allows her to live more fully. She has had it for one year. However she has fallen multiple times but hasn't injured herself.  She requested a cup of water and could not feel with R D1-3 that she was holding it, and depended on her R D4-5. If not numbness, she feels burning or needle pricking. R hand is more swollen at night.  At night sometimes has numbness in L D1-3.   She is due to schedule an appointment for cervical injection.    Prior level of function   Full functional use of right hand    Patient Intake and Medical History form reviewed    Pain  Moderate on QuickDASH. She described the pain as burning; no number given    Objective   Outcome Measure QuickDASH: 39 / 63.64       MRI of 5/19/25 IMPRESSION:  Multilevel degenerative changes of  the cervical spine most prominent  at C3-4 and C4-5 where there is moderate right foraminal stenosis. No  high-grade canal stenosis at any level. Findings appear grossly  unchanged from previous MRI 03/28/2018.     Sensation  R thumb/IF/MF numb, burning, or pin pricking; no one ever considered carpal tunnel syndrome     Neuro exam: TBE  Radial nerve: /5 strength in thumb extension, sensation intact to dorsal surface of thumb/index web space  Median nerve: /5 strength in thumb abduction and opposition, sensation intact to palmar surface of index finger  Ulnar nerve: /5 strength in thumb/little finger approximation, sensation intact to palmar surface of little finger     Physical Exam  Wound/scar: n/a  Edema: mild about R hand    Posture  TBE    AROM RUE  Shoulder: TBE  flexion   extension    abduction   external rotation   internal rotation      Elbow/forearm/wrist: WNL     Hand: Most digits reach the palm; full fist after working on her home program; she has to concentrate on controlling her R hand motion, but not the L hand    Strength RUE TBE  SHOULDER (MMT)   R L   Flexion     Extension     Abduction     IR at 0 degrees abd     ER at 0 degrees abd        ELBOW (MMT)   R L   Extension     Flexion     Pronation     Supination        WRIST (MMT)   R L   Extension     Flexion     Radial Deviation     Ulnar Deviation     Pronation     Supination        HAND (lbs)   R L   Dynamometer  2 7   Lateral Pinch     3jaw Pinch        Special Tests  Shoulder: Durkan's at 3 TOS locations between neck and shoulder: NEG  Elbow: Durkan's at lacertus fibrosis: NEG  Wrist: Durkan's at carpal tunnel: NEG    No evidence of R median nerve compression between neck and shoulder (TOS), at elbow, or at wrist    TREATMENT  Education: home exercise program, plan of care, activity modification, pain management, and pertinent anatomy     Therapeutic Exercise: Instructed her in use of moist heat prior to finger tendon gliding without  blocking for edema reduction and full finger motion, /pinch strengthening with green foam block (issued), and active cervical AROM (cervical rotation to the left and right, lateral cervical flexion to left and right, cervical extension and flexion, and chin tucks), all times 2 sets of 10 twice/day. She performed all correctly. Fitted her with and issued a small right tipless compression glove.      OT Evaluation Time Entry  Evaluation (Complex) Time Entry: 40  OT Therapeutic Procedures Time Entry  Therapeutic Exercise Time Entry: 5 (not charged)           Date of Evaluation: 2025    Onset Date: 3/1/25    Referring Physician: MEERA Brar-CNP    Surgery in the Last 3 months:  no    CPT Codes: Therapeutic Exercise: 89725, Therapeutic Activity: 18127, Neuromuscular Re-Education: 66490, Manual Therapy: 72813, and Self-Care/Home Management Trainin    Diagnosis:   M50.30 cervical degenerative disc disease    Functional Outcome:  Quick Dash 39    OT / PT Evaluation complexity:  high    Which of the following best describes the primary reason of therapy: Improving, restoring, or adapting functional mobility or skills    Visits Requested: 20    Date Range: 25 - 9/10/25    Frequency: 1 times per week    Duration: 3 months    Select all conditions that apply: Arthritis Conditions     Social Determinants of health:  N/A    Noemi Perez, OT

## 2025-07-11 ENCOUNTER — OFFICE VISIT (OUTPATIENT)
Dept: PRIMARY CARE | Facility: CLINIC | Age: 65
End: 2025-07-11
Payer: MEDICAID

## 2025-07-11 VITALS
OXYGEN SATURATION: 95 % | DIASTOLIC BLOOD PRESSURE: 87 MMHG | RESPIRATION RATE: 18 BRPM | SYSTOLIC BLOOD PRESSURE: 122 MMHG | TEMPERATURE: 97.9 F | HEART RATE: 89 BPM

## 2025-07-11 DIAGNOSIS — F41.9 ANXIETY: ICD-10-CM

## 2025-07-11 DIAGNOSIS — M50.30 DDD (DEGENERATIVE DISC DISEASE), CERVICAL: ICD-10-CM

## 2025-07-11 DIAGNOSIS — L98.9 FACIAL LESION: Primary | ICD-10-CM

## 2025-07-11 PROCEDURE — 99213 OFFICE O/P EST LOW 20 MIN: CPT | Performed by: NURSE PRACTITIONER

## 2025-07-11 PROCEDURE — 1036F TOBACCO NON-USER: CPT | Performed by: NURSE PRACTITIONER

## 2025-07-11 PROCEDURE — 3079F DIAST BP 80-89 MM HG: CPT | Performed by: NURSE PRACTITIONER

## 2025-07-11 PROCEDURE — 3074F SYST BP LT 130 MM HG: CPT | Performed by: NURSE PRACTITIONER

## 2025-07-11 RX ORDER — TRAMADOL HYDROCHLORIDE 50 MG/1
50 TABLET, FILM COATED ORAL EVERY 4 HOURS PRN
Qty: 180 TABLET | Refills: 0 | Status: SHIPPED | OUTPATIENT
Start: 2025-07-11 | End: 2025-08-10

## 2025-07-11 RX ORDER — LORAZEPAM 1 MG/1
1 TABLET ORAL EVERY 6 HOURS PRN
Qty: 10 TABLET | Refills: 0 | Status: SHIPPED | OUTPATIENT
Start: 2025-07-11 | End: 2025-08-10

## 2025-07-11 ASSESSMENT — PATIENT HEALTH QUESTIONNAIRE - PHQ9
2. FEELING DOWN, DEPRESSED OR HOPELESS: SEVERAL DAYS
SUM OF ALL RESPONSES TO PHQ9 QUESTIONS 1 AND 2: 1
1. LITTLE INTEREST OR PLEASURE IN DOING THINGS: NOT AT ALL

## 2025-07-11 ASSESSMENT — ENCOUNTER SYMPTOMS
OCCASIONAL FEELINGS OF UNSTEADINESS: 1
LOSS OF SENSATION IN FEET: 0
DEPRESSION: 0

## 2025-07-11 ASSESSMENT — PAIN SCALES - GENERAL: PAINLEVEL_OUTOF10: 7

## 2025-07-11 NOTE — PROGRESS NOTES
Subjective   Cynthia Cantrell is a 64 y.o. female who presents for Follow-up (Followup and scab on left cheek).  HPI  Needs pain meds  Has left cheek lesion4 months  Is on biologics and needs derm referral  Needs few doses of ativan  Under a lot of stres  Needs wrta paperwork completed    All systems reviewed. Review of systems negative except for noted positives in HPI    Objective     /87 (BP Location: Right arm, Patient Position: Sitting, BP Cuff Size: Adult)   Pulse 89   Temp 36.6 °C (97.9 °F) (Skin)   Resp 18   LMP  (LMP Unknown)   SpO2 95%    Vital signs noted and reviewed.       Physical Exam  Vitals and nursing note reviewed.   Constitutional:       Appearance: Normal appearance.   HENT:      Head: Normocephalic and atraumatic.      Right Ear: Tympanic membrane normal.      Left Ear: Tympanic membrane normal.      Nose: Nose normal.      Mouth/Throat:      Mouth: Mucous membranes are moist.   Eyes:      Extraocular Movements: Extraocular movements intact.      Pupils: Pupils are equal, round, and reactive to light.   Cardiovascular:      Rate and Rhythm: Normal rate and regular rhythm.      Pulses: Normal pulses.      Heart sounds: Normal heart sounds.   Pulmonary:      Effort: Pulmonary effort is normal.      Breath sounds: Normal breath sounds.   Abdominal:      General: Bowel sounds are normal.      Palpations: Abdomen is soft.   Musculoskeletal:         General: Normal range of motion.      Cervical back: Neck supple.   Skin:     General: Skin is warm and dry.      Capillary Refill: Capillary refill takes less than 2 seconds.   Neurological:      General: No focal deficit present.      Mental Status: She is alert.   Psychiatric:         Mood and Affect: Mood normal.             Assessment/Plan   Problem List Items Addressed This Visit    None  Visit Diagnoses         Facial lesion    -  Primary    Relevant Orders    Referral to Dermatology      DDD (degenerative disc disease), cervical         Relevant Medications    traMADol (Ultram) 50 mg tablet      Anxiety        Relevant Medications    LORazepam (Ativan) 1 mg tablet                  Needs to have wrta paratransit paperwork completed

## 2025-07-14 DIAGNOSIS — K21.9 GASTROESOPHAGEAL REFLUX DISEASE, UNSPECIFIED WHETHER ESOPHAGITIS PRESENT: ICD-10-CM

## 2025-07-14 RX ORDER — OMEPRAZOLE 40 MG/1
40 CAPSULE, DELAYED RELEASE ORAL DAILY
Qty: 90 CAPSULE | Refills: 3 | Status: CANCELLED | OUTPATIENT
Start: 2025-07-14

## 2025-07-15 DIAGNOSIS — K21.9 GASTROESOPHAGEAL REFLUX DISEASE, UNSPECIFIED WHETHER ESOPHAGITIS PRESENT: ICD-10-CM

## 2025-07-15 RX ORDER — OMEPRAZOLE 40 MG/1
40 CAPSULE, DELAYED RELEASE ORAL DAILY
Qty: 90 CAPSULE | Refills: 3 | Status: SHIPPED | OUTPATIENT
Start: 2025-07-15

## 2025-07-23 ENCOUNTER — APPOINTMENT (OUTPATIENT)
Dept: NUTRITION | Facility: CLINIC | Age: 65
End: 2025-07-23
Payer: MEDICAID

## 2025-07-23 NOTE — PROGRESS NOTES
[employer]  Diet Recommendation 1: Healthy Eating  Diet Recommendation 2: MyPlate  Food Intolerance Avoidance: Allergic to Tree Nuts, Eggs, Flaxseed, Celery, Bell Peppers  Nutrition Goals Stated: maintain healthy weight  Household Size: 1 Family Member  Interventions: Referral Number: 1st 6 Mo Referral 6 Mos  Interventions: Visit Number: 3 of 6 Visits - Max 6 Visits/Referral Each 6 Mo Period  Education Today: Healthy Recipes  Follow Up Notes for Future Visits: -  Recipes Today: -  Grains: 25-50% Whole  Fruit: Fresh - 100%  Vegetables: Fresh - 100%  Proteins: 0 Plant-based Items  Dairy: Lowfat - 100%  Originating Site of Referral Order: MEERA [place]-CNP  Initials of RD Assisting Today: ETHEL

## 2025-07-29 ENCOUNTER — HOSPITAL ENCOUNTER (EMERGENCY)
Facility: HOSPITAL | Age: 65
Discharge: HOME | End: 2025-07-29
Payer: MEDICAID

## 2025-07-29 ENCOUNTER — APPOINTMENT (OUTPATIENT)
Dept: RADIOLOGY | Facility: HOSPITAL | Age: 65
End: 2025-07-29
Payer: MEDICAID

## 2025-07-29 VITALS
RESPIRATION RATE: 18 BRPM | HEART RATE: 80 BPM | OXYGEN SATURATION: 96 % | WEIGHT: 145 LBS | TEMPERATURE: 97.6 F | BODY MASS INDEX: 25.69 KG/M2 | HEIGHT: 63 IN | SYSTOLIC BLOOD PRESSURE: 114 MMHG | DIASTOLIC BLOOD PRESSURE: 87 MMHG

## 2025-07-29 DIAGNOSIS — S99.921A INJURY OF TOE ON RIGHT FOOT, INITIAL ENCOUNTER: ICD-10-CM

## 2025-07-29 DIAGNOSIS — M79.674 GREAT TOE PAIN, RIGHT: Primary | ICD-10-CM
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
## 2025-07-29 PROCEDURE — 73630 X-RAY EXAM OF FOOT: CPT | Mod: RT

## 2025-07-29 PROCEDURE — 99284 EMERGENCY DEPT VISIT MOD MDM: CPT

## 2025-07-29 PROCEDURE — 73610 X-RAY EXAM OF ANKLE: CPT | Mod: RIGHT SIDE | Performed by: RADIOLOGY

## 2025-07-29 PROCEDURE — 73610 X-RAY EXAM OF ANKLE: CPT | Mod: RT

## 2025-07-29 PROCEDURE — 73630 X-RAY EXAM OF FOOT: CPT | Mod: RIGHT SIDE | Performed by: RADIOLOGY

## 2025-07-29 ASSESSMENT — PAIN SCALES - GENERAL: PAINLEVEL_OUTOF10: 7

## 2025-07-29 ASSESSMENT — PAIN - FUNCTIONAL ASSESSMENT: PAIN_FUNCTIONAL_ASSESSMENT: 0-10

## 2025-07-29 ASSESSMENT — PAIN DESCRIPTION - ORIENTATION: ORIENTATION: RIGHT

## 2025-07-29 ASSESSMENT — PAIN DESCRIPTION - LOCATION: LOCATION: FOOT

## 2025-07-29 NOTE — ED PROVIDER NOTES
HPI   CC: Foot Injury     Patient is a 64-year-old female with PMH neuropathy, spinal stenosis, rheumatoid arthritis presenting to the ED with concern for foot injury.  Patient states yesterday afternoon she was walking when she accidentally flexed her right big toe and tripped over her foot.  She denies falling down, hitting her head, or injuring any other body part.  Patient currently endorses pain located on the lateral aspect of the right big toe with overlying bruising.  She is fine at rest but whenever she goes to bear weight on her toe pain flares up to 7/10.  She is still able to walk.  Patient states she has neuropathy at baseline but has no change in her numbness or tingling.  Denies any weakness in her foot.  Denies any redness, warmth, swelling.  She has been taking tramadol at home that does help with pain.  Patient states that she has a history of surgery in her right foot and would like to be checked to make sure hardware is intact.        ROS: 10-point review of systems was performed and is otherwise negative except as noted in HPI.    Limitations to history: N/A  Independent Historians: Self   External Records Reviewed: Outpatient notes in EMR    Past Medical History: Noncontributory except per HPI     Past Surgical History: Noncontributory except per HPI     Family History: Reviewed and noncontributory     Social History:  Denies tobacco. Denies ETOH. Denies illicit drugs.    RX Allergies[1]    Home Meds:   Current Outpatient Medications   Medication Instructions   • abatacept (Orencia, with maltose,) 250 mg injection Every 28 days   • aspirin 81 mg, oral, Daily   • cyclobenzaprine (FLEXERIL) 10 mg, oral, 3 times daily PRN   • diclofenac sodium (VOLTAREN) 100 g, As needed   • DULoxetine (CYMBALTA) 60 mg, oral, 2 times daily   • ezetimibe (ZETIA) 10 mg, oral, Daily   • leflunomide (Arava) 20 mg tablet 1 tablet DAILY (route: oral)   • LORazepam (ATIVAN) 1 mg, oral, Every 6 hours PRN, Do not take with  the pain meds .. Rarely take this   • metoprolol succinate XL (TOPROL-XL) 50 mg, oral, Daily   • nystatin (Mycostatin) 100,000 unit/gram powder 1 Application, Topical, 2 times daily   • omeprazole (PRILOSEC) 40 mg, oral, Daily   • pilocarpine (SALAGEN) 5 mg, oral, 2 times daily   • polyvinyl alcohol (Liquifilm Tears) 1.4 % ophthalmic solution 1-2 drops, 4 times daily   • pregabalin (LYRICA) 100 mg, oral, 3 times daily   • rosuvastatin (CRESTOR) 20 mg, oral, Daily   • traMADol (ULTRAM) 50 mg, oral, Every 4 hours PRN   • traZODone (DESYREL) 300 mg, oral, Nightly PRN   • Vitamin D3 50 mcg, oral, Daily        Physical Exam     ED Triage Vitals [07/29/25 1504]   Temperature Heart Rate Respirations BP   36.4 °C (97.6 °F) 79 18 (!) 112/92      Pulse Ox Temp src Heart Rate Source Patient Position   95 % -- -- --      BP Location FiO2 (%)     -- --         Vitals and nursing note reviewed.   Physical Exam  Constitutional:       General: She is not in acute distress.     Appearance: Normal appearance. She is not ill-appearing, toxic-appearing or diaphoretic.   HENT:      Head: Normocephalic and atraumatic.      Mouth/Throat:      Mouth: Mucous membranes are moist.      Pharynx: Oropharynx is clear. No oropharyngeal exudate or posterior oropharyngeal erythema.     Eyes:      General: No scleral icterus.        Right eye: No discharge.         Left eye: No discharge.      Extraocular Movements: Extraocular movements intact.      Conjunctiva/sclera: Conjunctivae normal.      Pupils: Pupils are equal, round, and reactive to light.       Cardiovascular:      Rate and Rhythm: Normal rate and regular rhythm.      Heart sounds: Normal heart sounds. No murmur heard.     No friction rub. No gallop.      Comments: DP and PT 2+ bilaterally  Pulmonary:      Effort: Pulmonary effort is normal. No respiratory distress.      Breath sounds: Normal breath sounds. No stridor. No wheezing, rhonchi or rales.   Abdominal:      General: Abdomen is  flat. Bowel sounds are normal. There is no distension.      Palpations: Abdomen is soft.      Tenderness: There is no abdominal tenderness. There is no right CVA tenderness, left CVA tenderness, guarding or rebound.     Musculoskeletal:         General: Normal range of motion.      Cervical back: Normal range of motion and neck supple. No rigidity or tenderness.      Comments: There is no joint deformity, crepitus, swelling, erythema, or warmth. Compartments are soft. ROM full to bilateral hip flexion/extension/adduction/abduction/internal rotation/external rotation, knee flexion/extension, ankle dorsi/plantarflexion/inversion/eversion, toe flexion/extension.    Bruising and tenderness noted to lateral aspect of right big toe.   Lymphadenopathy:      Cervical: No cervical adenopathy.     Skin:     General: Skin is warm and dry.      Capillary Refill: Capillary refill takes less than 2 seconds.     Neurological:      General: No focal deficit present.      Mental Status: She is alert and oriented to person, place, and time.      Comments: Sensation intact to light touch in bilateral lower extremities.  Ankle dorsi/plantarflexion 5/5 bilaterally   Psychiatric:         Mood and Affect: Mood normal.         Behavior: Behavior normal.         Diagnostic Results      Labs Reviewed - No data to display      XR foot right 3+ views   Final Result   Surgical and arthritic changes as described. No acute fracture or   dislocation in the current exams.        MACRO:   None        Signed by: Cale Mitchell 7/29/2025 4:40 PM   Dictation workstation:   [id number]      XR ankle right 3+ views   Final Result   Surgical and arthritic changes as described. No acute fracture or   dislocation in the current exams.        MACRO:   None        Signed by: Cale Mitchell 7/29/2025 4:40 PM   Dictation workstation:   [id number]          ED Course & MDM   Assessment/Plan:   Medications - No data to display   ED Course as of 07/29/25 1728   Tue  Jul 29, 2025 1706 XR foot right 3+ views  No obvious fracture on my interpretation. [VS]   1707 XR ankle right 3+ views  No obvious fracture on my interpretation. [VS]   1725 Patient is a 64-year-old female presenting to the ED with concern for right big toe injury.  Vital signs are stable, patient is nontoxic-appearing.  Patient is neurovascularly intact in the right lower extremity with good pulses and soft compartments.  There is no overlying joint redness or warmth.  Lower suspicion for acute arterial occlusion, compartment syndrome, septic joint, crystalline arthropathy.  There is some tenderness and bruising to the lateral aspect of the right big toe but she does have maintained range of motion to toe flexion/extension.  X-rays obtained are negative for any acute bony abnormalities.  I suspect likely contusion.  Offered buddy tape and Ace wrap in ED and  patient would like this.  Buddy tape and Ace wrap applied and patient was assessed after placement was neurovascularly intact.  Offered crutches, patient states that she has a wheelchair at home that she prefers to use.  Recommend RICE therapy.  Recommend Motrin and Tylenol as needed for pain.  Offered to send prescriptions, however patient states she has those and tramadol at home.  Patient was educated that x-rays can be negative for fractures up to 14 days postinjury and if she is still experiencing pain after 1-2 weeks to follow-up with  orthopedic injury clinic.  Given extensive surgical history in right foot referral order for podiatry was placed alhaji encourage patient for follow-up.  Patient understands and is agreeable with plan. Patient told to return to ED for any new or worsening symptoms. [VS]      ED Course User Index  [VS] Deven Mcconnell PA-C         Diagnoses as of 07/29/25 1728   Great toe pain, right   Fall, initial encounter   Injury of toe on right foot, initial encounter       ED Prescriptions    None         Chronic Medical  Conditions Significantly Affecting Care:      Escalation of Care: Appropriate for outpatient management    Counseling: Spoke with the patient and discussed today´s findings, in addition to providing specific details for the plan of care and expected course.  Patient was given the opportunity to ask questions.    Discussed return precautions and importance of follow-up.  Advised to follow-up with podiatry and  orthopedic injury clinic.  Advised to return to the ED for changing or worsening symptoms, new symptoms, complaint specific precautions, and precautions listed on the discharge paperwork.    I advised the patient that the emergency evaluation and treatment provided today doesn't end their need for medical care. It is very important that they follow-up with their primary care provider or other specialist as instructed.    The plan of care was mutually agreed upon with the patient. The patient and/or family were given the opportunity to ask questions. All questions asked today in the ED were answered to the best of my ability with today's information.    I specifically advised the patient to return to the ED for changing or worsening symptoms, worrisome new symptoms, or for any complaint specific precautions listed on the discharge paperwork.    Procedure  Splint Application    Performed by: Deven Mcconnell PA-C  Authorized by: Deven Mcconnell PA-C    Consent:     Consent obtained:  Verbal    Consent given by:  Patient    Risks, benefits, and alternatives were discussed: yes      Risks discussed:  Discoloration, numbness, pain and swelling    Alternatives discussed:  Delayed treatment, no treatment, alternative treatment, observation and referral  Universal protocol:     Procedure explained and questions answered to patient or proxy's satisfaction: yes      Relevant documents present and verified: yes      Imaging studies available: yes      Required blood products, implants, devices, and  special equipment available: yes      Site/side marked: yes      Immediately prior to procedure a time out was called: yes      Patient identity confirmed:  Verbally with patient and arm band  Pre-procedure details:     Distal neurologic exam:  Normal    Distal perfusion: distal pulses strong and brisk capillary refill    Procedure details:     Location:  Toe    Toe location:  R big toe    Lower extremity splint type: deneen tape.    Splint applied by::  Nursing staff    Supervision: I personally supervised and inspected the splint/strap which was applied. The extremity is appropriately immobilized. Patient neurovascularly intact before and after the splint application.    Post-procedure details:     Distal neurologic exam:  Normal    Distal perfusion: distal pulses strong and brisk capillary refill      Procedure completion:  Tolerated  Splint Application    Performed by: Deven Mcconnell PA-C  Authorized by: Deven Mcconnell PA-C    Consent:     Consent obtained:  Verbal    Consent given by:  Patient    Risks, benefits, and alternatives were discussed: yes      Risks discussed:  Discoloration, numbness, pain and swelling    Alternatives discussed:  Delayed treatment, no treatment, alternative treatment, observation and referral  Universal protocol:     Procedure explained and questions answered to patient or proxy's satisfaction: yes      Relevant documents present and verified: yes      Imaging studies available: yes      Required blood products, implants, devices, and special equipment available: yes      Site/side marked: yes      Immediately prior to procedure a time out was called: yes      Patient identity confirmed:  Verbally with patient and arm band  Pre-procedure details:     Distal neurologic exam:  Normal    Distal perfusion: distal pulses strong and brisk capillary refill    Procedure details:     Location:  Ankle    Ankle location:  R ankle    Lower extremity splint type: Ace wrap.     Splint applied by::  Nursing staff    Supervision: I personally supervised and inspected the splint/strap which was applied. The extremity is appropriately immobilized. Patient neurovascularly intact before and after the splint application.    Post-procedure details:     Distal neurologic exam:  Normal    Distal perfusion: distal pulses strong and brisk capillary refill      Procedure completion:  Tolerated             [1]  Allergies  Allergen Reactions   • Adalimumab Swelling   • Cope Anaphylaxis   • Amoxicillin-Pot Clavulanate Swelling and Angioedema   • Cephalosporins Swelling and Unknown     swelling   • Egg Anaphylaxis and Unknown   • Flaxseed Anaphylaxis   • Furosemide Anaphylaxis and Swelling   • Loperamide Unknown and Angioedema   • Methotrexate Swelling, Other and Unknown   • Milnacipran Swelling   • Neomycin-Polymyxin-Hc Swelling   • Nut - Unspecified Anaphylaxis   • Propofol Swelling and Other   • Sulfa (Sulfonamide Antibiotics) Swelling   • Sulfacetamide Swelling   • Sulfamethoxazole Swelling and Other   • Tree Nut Anaphylaxis   • Baker Anaphylaxis   • Capsaicin Hives and Unknown   • Capsaicin-Menthol Unknown and Rash   • Gabapentin Other and Dizziness      Deven Mcconnell PA-C  07/29/25 [id number]

## 2025-07-29 NOTE — ED TRIAGE NOTES
Pt states she had mechanical fall yesterday, Pt. C/o R foot pain, Previous hx of surgery to R foot with hardware. Did not hit head, no thinners

## 2025-07-29 NOTE — DISCHARGE INSTRUCTIONS
Continue Tylenol and/or ibuprofen as needed for pain. Recommend RICE therapy-rest, ice 2-3 times per day 20 minutes at a time, compression with Ace wrap, elevate when seated. Weightbearing as tolerated.   Please follow up with  [facility] to ensure symptoms are improving  Referral for podiatry placed. Please follow up for further evaluation and management. You can stop at the  in waiting room to schedule an appointment before you leave today.  Return to ED for any new or worsening symptoms

## 2025-07-30 ENCOUNTER — TREATMENT (OUTPATIENT)
Dept: OCCUPATIONAL THERAPY | Facility: HOSPITAL | Age: 65
End: 2025-07-30
Payer: MEDICAID

## 2025-07-30 DIAGNOSIS — M50.30 DDD (DEGENERATIVE DISC DISEASE), CERVICAL: ICD-10-CM

## 2025-07-30 PROCEDURE — 97140 MANUAL THERAPY 1/> REGIONS: CPT | Mod: GO

## 2025-07-30 PROCEDURE — 97110 THERAPEUTIC EXERCISES: CPT | Mod: GO

## 2025-07-30 NOTE — PROGRESS NOTES
Occupational Therapy Treatment  Patient Name: Cynthia Cantrell  MRN: [id number]  Today's Date: 7/30/2025  Time Calculation  Start Time: 1120  Stop Time: 1205  Time Calculation (min): 45 min  Problem List Items Addressed This Visit    None  Visit Diagnoses         Codes      DDD (degenerative disc disease), cervical     M50.30             Insurance  Visit 2 of 18 until 10/3/25  Authorization: required  Insurance plan: Payor: [employer] [facility] TIMI / Plan: [employer] [facility] PLAN / Product Type: *No Product type* /     Assessment Cynthia's finger numbness has decreased bilaterally because she is doing her home program. Today I assessed her shoulders, upon which she is dependent for using her manual wheelchair and for transfers. Her motion is good, but painful, and her strength is lacking. She must build shoulder strength before we attempt to work on her ability to dismantle her wheelchair after she transfers into her car and to lift the pieces into her passenger seat, which is her goal.    Plan assess posture, reassess shoulder/elbow strength; therapeutic exercise, therapeutic activity, self-care, home management, manual therapy, neuromuscular coordination, vasopneumatic (BFR), electric stimulation (TENS), fluidotherapy, orthosis fabrication, edema care  Goals  In 8-10 weeks, Cynthia will achieve the following goals:  She will be able to perform self-care, household, and leisure tasks with lower pain/paresthesia (1-3/10), 50% of the time.  She will improve R hand motion to full fisting in order to perform self-care, household, and leisure tasks.  She will improve bilateral  strength in order to fully perform self-care, household, and leisure tasks.  She will increase fine motor strength of bilateral hands by at increasing her bilateral pinch strength by at least 5 lbs in order to improve her motor performance when cutting food and opening jars/caps/lids.  She will decrease her QuickDASH  raw score by 15-25 points (39 at eval), signifying decreasing difficulty with performance of daily occupations.  Patient's goals for therapy: Would like to learn how to most efficiently sit first then dismantle the WC while sitting and put it in the car.    Plan of care was developed with input and agreement by the patient  Frequency: 1 x Week  Duration: 12 Weeks    Precautions  Movement Restrictions: No:  Weight Bearing Status: As tolerated    Subjective  onset March 2025  With gloves L hand doesn't start to get numb, R thumb, IF move better and only the tips are numb/tingly; exercises going well, also bought balls to squeeze    [Presents in manual dancing WC that she purchased herself. She dances with [employer] and performs ballroom dancing with standing partner. Also learning a form of martial art. Also has power chair.  Has nerve stimulator in lower back which helps with pain and allows her to stand for 5 min allowing her to cook sometimes and transfer from wheelchair to car and to get into [facility] center which is not  accesible, and allows her to live more fully. She has had it for one year. However she has fallen multiple times but hasn't injured herself.  She requested a cup of water and could not feel with R D1-3 that she was holding it, and depended on her R D4-5. If not numbness, she feels burning or needle pricking. R hand is more swollen at night.  At night sometimes has numbness in L D1-3. She is due to schedule an appointment for cervical injection.]    Prior level of function   Full functional use of right hand    Patient Intake and Medical History form reviewed    Pain  Moderate on QuickDASH. She described the pain as burning; no number given    Objective   Outcome Measure QuickDASH: 39 / 63.64       MRI of 5/19/25 IMPRESSION:  Multilevel degenerative changes of the cervical spine most prominent  at C3-4 and C4-5 where there is moderate right foraminal stenosis. No  high-grade canal stenosis  at any level. Findings appear grossly  unchanged from previous MRI 03/28/2018.     Sensation  R thumb/IF/MF numb, burning, or pin pricking    Neuro exam: TBE  Radial nerve: /5 strength in thumb extension, sensation intact to dorsal surface of thumb/index web space  Median nerve: /5 strength in thumb abduction and opposition, sensation intact to palmar surface of index finger  Ulnar nerve: /5 strength in thumb/little finger approximation, sensation intact to palmar surface of little finger     Physical Exam  Wound/scar: n/a  Edema: mild about R hand    Posture  TBE    AROM RUE  Shoulder: WFL SAMIR  Elbow/forearm/wrist: WNL     Hand: Most digits reach the palm; full fist after working on her home program; she has to concentrate on controlling her R hand motion, but not the L hand    Strength RUE  SHOULDER (MMT)   R L   Flexion     Extension     Abduction     IR at 0 degrees abd     ER at 0 degrees abd        ELBOW (MMT)   R L   Extension     Flexion     Pronation     Supination        WRIST (MMT)   R L   Extension     Flexion     Radial Deviation     Ulnar Deviation     Pronation     Supination        HAND (lbs)   R L   Dynamometer  2 7   Lateral Pinch     3jaw Pinch        Special Tests  Shoulder: Durkan's at 3 TOS locations between neck and shoulder: NEG  Elbow: Durkan's at lacertus fibrosis: NEG  Wrist: Durkan's at carpal tunnel: NEG    No evidence of R median nerve compression between neck and shoulder, at elbow, or at wrist    TREATMENT  Education: home exercise program, plan of care, activity modification, pain management, and pertinent anatomy     Manual: Trigger point releases about posterior neck muscles for pain relief and increased ROM, instructed her to do the same for herself after use of moist heat.  Therapeutic Exercise: Reassessed symptoms, functional status, HEP and compliance, assessed bilateral shoulder AROM and strength. Instructed her in isometric shoulder strengthening in sitting, which she  performed correctly, added to HEP    HEP: moist heat followed by: finger tendon gliding without blocking for edema reduction and full finger motion, /pinch strengthening with green foam block or stress ball, active cervical AROM (rotation to the left and right, lateral flexion to left and right, extension and flexion, and chin tucks), all times 2 sets of 10 twice/day.       OT Therapeutic Procedures Time Entry  Therapeutic Exercise Time Entry: 25  Manual Therapy Time Entry: 20

## 2025-08-01 ENCOUNTER — OFFICE VISIT (OUTPATIENT)
Dept: PRIMARY CARE | Facility: CLINIC | Age: 65
End: 2025-08-01
Payer: MEDICAID

## 2025-08-01 VITALS
SYSTOLIC BLOOD PRESSURE: 122 MMHG | HEIGHT: 63 IN | RESPIRATION RATE: 16 BRPM | BODY MASS INDEX: 25.69 KG/M2 | TEMPERATURE: 96.6 F | DIASTOLIC BLOOD PRESSURE: 85 MMHG | HEART RATE: 84 BPM | OXYGEN SATURATION: 95 % | WEIGHT: 145 LBS

## 2025-08-01 DIAGNOSIS — M79.604 RIGHT LEG PAIN: Primary | ICD-10-CM

## 2025-08-01 DIAGNOSIS — M50.30 DDD (DEGENERATIVE DISC DISEASE), CERVICAL: ICD-10-CM

## 2025-08-01 DIAGNOSIS — T78.3XXS ALLERGIC ANGIOEDEMA, SEQUELA: ICD-10-CM

## 2025-08-01 PROCEDURE — 99214 OFFICE O/P EST MOD 30 MIN: CPT | Performed by: NURSE PRACTITIONER

## 2025-08-01 PROCEDURE — 3008F BODY MASS INDEX DOCD: CPT | Performed by: NURSE PRACTITIONER

## 2025-08-01 PROCEDURE — 3074F SYST BP LT 130 MM HG: CPT | Performed by: NURSE PRACTITIONER

## 2025-08-01 PROCEDURE — 1036F TOBACCO NON-USER: CPT | Performed by: NURSE PRACTITIONER

## 2025-08-01 PROCEDURE — 3079F DIAST BP 80-89 MM HG: CPT | Performed by: NURSE PRACTITIONER

## 2025-08-01 RX ORDER — EPINEPHRINE 0.3 MG/.3ML
1 INJECTION SUBCUTANEOUS AS NEEDED
Qty: 2 EACH | Refills: 1 | Status: SHIPPED | OUTPATIENT
Start: 2025-08-01 | End: 2026-08-01

## 2025-08-01 RX ORDER — FAMOTIDINE 20 MG/1
20 TABLET, FILM COATED ORAL NIGHTLY
Qty: 30 TABLET | Refills: 3 | Status: SHIPPED | OUTPATIENT
Start: 2025-08-01 | End: 2025-11-29

## 2025-08-01 RX ORDER — TRAMADOL HYDROCHLORIDE 50 MG/1
50 TABLET, FILM COATED ORAL EVERY 4 HOURS PRN
Qty: 180 TABLET | Refills: 0 | Status: SHIPPED | OUTPATIENT
Start: 2025-08-01 | End: 2025-08-31

## 2025-08-01 ASSESSMENT — ENCOUNTER SYMPTOMS
OCCASIONAL FEELINGS OF UNSTEADINESS: 0
DEPRESSION: 0
LOSS OF SENSATION IN FEET: 0

## 2025-08-01 ASSESSMENT — PAIN SCALES - GENERAL: PAINLEVEL_OUTOF10: 7

## 2025-08-01 NOTE — PROGRESS NOTES
"Subjective   Cynthia Cantrell is a 64 y.o. female who presents for Follow-up.  HPI  Will be out of town at time of next appt  Fell and seen in ED last week. Fell after infusion. BP is always on lower side, discussed need to tell them maybe they need to infuse 500 ml bolus and she needs to wait there.   Still having moderate pain to right ankle foot tib fib and knee  Hx of prior fusion and hardware    Needs epi pen    Will send meds in early she will check with pharmacy to ensure that she can  prior to her trip    All systems reviewed. Review of systems negative except for noted positives in HPI    Objective     /85   Pulse 84   Temp 35.9 °C (96.6 °F)   Resp 16   Ht 1.6 m (5' 3\")   Wt 65.8 kg (145 lb)   LMP  (LMP Unknown)   SpO2 95%   BMI 25.69 kg/m²    Vital signs noted and reviewed.       Physical Exam  Vitals and nursing note reviewed.   Constitutional:       Appearance: Normal appearance.   HENT:      Head: Normocephalic and atraumatic.      Right Ear: Tympanic membrane normal.      Left Ear: Tympanic membrane normal.      Nose: Nose normal.      Mouth/Throat:      Mouth: Mucous membranes are moist.     Eyes:      Extraocular Movements: Extraocular movements intact.      Pupils: Pupils are equal, round, and reactive to light.       Cardiovascular:      Rate and Rhythm: Normal rate and regular rhythm.      Pulses: Normal pulses.      Heart sounds: Normal heart sounds.   Pulmonary:      Effort: Pulmonary effort is normal.      Breath sounds: Normal breath sounds.   Abdominal:      Palpations: Abdomen is soft.     Musculoskeletal:         General: Normal range of motion.      Cervical back: Normal range of motion.     Skin:     General: Skin is warm and dry.      Capillary Refill: Capillary refill takes less than 2 seconds.     Neurological:      General: No focal deficit present.      Mental Status: She is alert.     Psychiatric:         Mood and Affect: Mood normal. "             Assessment/Plan   Problem List Items Addressed This Visit    None  Visit Diagnoses         Right leg pain    -  Primary    Relevant Orders    XR knee right 4+ views    XR tibia fibula right 2 views    Referral to Adult Orthopedics and Sports Medicine    XR ankle right 3+ views    XR foot right 3+ views      DDD (degenerative disc disease), cervical        Relevant Medications    traMADol (Ultram) 50 mg tablet      Allergic angioedema, sequela        Relevant Medications    EPINEPHrine (Epipen) 0.3 mg/0.3 mL injection syringe    diphenhydrAMINE (BENADryl) 50 mg tablet    famotidine (Pepcid) 20 mg tablet                  Needs to see ortho  Still having swelling and pain (no calf pain)   Orders as noted  Oaars reviewed  Discussed need to have epi pen with her, along with benadryl and pepcid karime since so allergic to many things

## 2025-08-05 ENCOUNTER — TREATMENT (OUTPATIENT)
Dept: OCCUPATIONAL THERAPY | Facility: HOSPITAL | Age: 65
End: 2025-08-05
Payer: MEDICAID

## 2025-08-05 DIAGNOSIS — M50.30 DDD (DEGENERATIVE DISC DISEASE), CERVICAL: ICD-10-CM

## 2025-08-05 PROCEDURE — 97140 MANUAL THERAPY 1/> REGIONS: CPT | Mod: GO

## 2025-08-05 NOTE — PROGRESS NOTES
Occupational Therapy Treatment  Patient Name: Cynthia Cantrell  MRN: [id number]  Today's Date: 8/5/2025     Problem List Items Addressed This Visit    None  Visit Diagnoses         Codes      DDD (degenerative disc disease), cervical     M50.30             Insurance  Visit 3  of 18 until 10/3/25  Authorization: required  Insurance plan: Payor: [employer] [facility] TIMI / Plan: [employer] [facility] PLAN / Product Type: *No Product type* /     Assessment Cynthia overdid the isometric shoulder strengthening and had increased pain for 2 days. After manual work today her left neck and shoulder felt less painful    Plan assess posture, reassess shoulder/elbow strength; therapeutic exercise, therapeutic activity, self-care, home management, manual therapy, neuromuscular coordination, vasopneumatic (BFR), electric stimulation (TENS), fluidotherapy, orthosis fabrication, edema care  Goals  In 8-10 weeks, Cynthia will achieve the following goals:  She will be able to perform self-care, household, and leisure tasks with lower pain/paresthesia (1-3/10), 50% of the time.  She will improve R hand motion to full fisting in order to perform self-care, household, and leisure tasks.  She will improve bilateral  strength in order to fully perform self-care, household, and leisure tasks.  She will increase fine motor strength of bilateral hands by at increasing her bilateral pinch strength by at least 5 lbs in order to improve her motor performance when cutting food and opening jars/caps/lids.  She will decrease her QuickDASH raw score by 15-25 points (39 at eval), signifying decreasing difficulty with performance of daily occupations.  Patient's goals for therapy: Would like to learn how to most efficiently sit first then dismantle the WC while sitting and put it in the car.    Plan of care was developed with input and agreement by the patient  Frequency: 1 x Week  Duration: 12 Weeks    Precautions  Movement  Restrictions: No:  Weight Bearing Status: As tolerated    Subjective  onset March 2025  Cynthia's R neck feels much looser. The isometric shoulder strengthening caused R shoulder pain for days, it's better now.    [Presents in manual dancing  that she purchased herself. She dances with [employer] and performs ballroom dancing with standing partner. Also learning a form of martial art. Also has power chair.  Has nerve stimulator in lower back which helps with pain and allows her to stand for 5 min allowing her to cook sometimes and transfer from wheelchair to car and to get into [facility] center which is not  accesible, and allows her to live more fully. She has had it for one year. However she has fallen multiple times but hasn't injured herself.  She requested a cup of water and could not feel with R D1-3 that she was holding it, and depended on her R D4-5. If not numbness, she feels burning or needle pricking. R hand is more swollen at night.  At night sometimes has numbness in L D1-3. She is due to schedule an appointment for cervical injection.]    Prior level of function   Full functional use of right hand    Patient Intake and Medical History form reviewed    Pain  Moderate on QuickDASH. She described the pain as burning; no number given    Objective   Outcome Measure QuickDASH: 39 / 63.64       MRI of 5/19/25 IMPRESSION:  Multilevel degenerative changes of the cervical spine most prominent  at C3-4 and C4-5 where there is moderate right foraminal stenosis. No  high-grade canal stenosis at any level. Findings appear grossly  unchanged from previous MRI 03/28/2018.     Sensation  R thumb/IF/MF numb, burning, or pin pricking    Neuro exam: TBE  Radial nerve: /5 strength in thumb extension, sensation intact to dorsal surface of thumb/index web space  Median nerve: /5 strength in thumb abduction and opposition, sensation intact to palmar surface of index finger  Ulnar nerve: /5 strength in thumb/little  finger approximation, sensation intact to palmar surface of little finger     Physical Exam  Wound/scar: n/a  Edema: mild about R hand    Posture  TBE    AROM RUE  Shoulder: WFL SAMIR  Elbow/forearm/wrist: WNL     Hand: Most digits reach the palm; full fist after working on her home program; she has to concentrate on controlling her R hand motion, but not the L hand    Strength RUE  SHOULDER (MMT)   R L   Flexion     Extension     Abduction     IR at 0 degrees abd     ER at 0 degrees abd        ELBOW (MMT)   R L   Extension     Flexion     Pronation     Supination        WRIST (MMT)   R L   Extension     Flexion     Radial Deviation     Ulnar Deviation     Pronation     Supination        HAND (lbs)   R L   Dynamometer  2 7   Lateral Pinch     3jaw Pinch        Special Tests  Shoulder: Durkan's at 3 TOS locations between neck and shoulder: NEG  Elbow: Durkan's at lacertus fibrosis: NEG  Wrist: Durkan's at carpal tunnel: NEG    No evidence of R median nerve compression between neck and shoulder, at elbow, or at wrist    TREATMENT  Education: home exercise program, plan of care, activity modification, pain management, and pertinent anatomy     Modalities: Moist heat to posterior neck, scapulae, and anterior shoulders in supine for pain relief and to prepare for treatment  Manual: Reassessed symptoms, functional status, HEP and compliance; re-issued isometric shoulder strengthening home program.  Trigger point releases about L neck and upper quadrant for pain relief and increased ROM    HEP: moist heat followed by: finger tendon gliding without blocking for edema reduction and full finger motion, /pinch strengthening with green foam block or stress ball, active cervical AROM (rotation to the left and right, lateral flexion to left and right, extension and flexion, and chin tucks), all times 2 sets of 10 twice/day.

## 2025-08-12 ENCOUNTER — APPOINTMENT (OUTPATIENT)
Dept: RADIOLOGY | Facility: HOSPITAL | Age: 65
End: 2025-08-12
Payer: MEDICAID

## 2025-08-12 ENCOUNTER — APPOINTMENT (OUTPATIENT)
Dept: ORTHOPEDIC SURGERY | Facility: HOSPITAL | Age: 65
End: 2025-08-12
Payer: MEDICAID

## 2025-08-12 DIAGNOSIS — M17.11 ARTHRITIS OF RIGHT KNEE: ICD-10-CM

## 2025-08-13 ENCOUNTER — APPOINTMENT (OUTPATIENT)
Dept: OCCUPATIONAL THERAPY | Facility: HOSPITAL | Age: 65
End: 2025-08-13
Payer: MEDICAID

## 2025-08-15 ENCOUNTER — CLINICAL SUPPORT (OUTPATIENT)
Dept: NUTRITION | Facility: CLINIC | Age: 65
End: 2025-08-15
Payer: MEDICAID

## 2025-08-17 ENCOUNTER — HOSPITAL ENCOUNTER (EMERGENCY)
Facility: HOSPITAL | Age: 65
Discharge: HOME | End: 2025-08-18
Attending: EMERGENCY MEDICINE
Payer: MEDICAID

## 2025-08-17 DIAGNOSIS — A49.9 BACTERIAL UTI: Primary | ICD-10-CM

## 2025-08-17 DIAGNOSIS — N39.0 BACTERIAL UTI: Primary | ICD-10-CM

## 2025-08-17 LAB
APPEARANCE UR: ABNORMAL
BACTERIA #/AREA URNS AUTO: ABNORMAL /HPF
BASOPHILS # BLD AUTO: 0.07 X10*3/UL (ref 0–0.1)
BASOPHILS NFR BLD AUTO: 0.8 %
BILIRUB UR STRIP.AUTO-MCNC: NEGATIVE MG/DL
COLOR UR: YELLOW
EOSINOPHIL # BLD AUTO: 0.29 X10*3/UL (ref 0–0.7)
EOSINOPHIL NFR BLD AUTO: 3.2 %
ERYTHROCYTE [DISTWIDTH] IN BLOOD BY AUTOMATED COUNT: 13.1 % (ref 11.5–14.5)
GLUCOSE UR STRIP.AUTO-MCNC: NORMAL MG/DL
HCT VFR BLD AUTO: 41.3 % (ref 36–46)
HGB BLD-MCNC: 13.5 G/DL (ref 12–16)
IMM GRANULOCYTES # BLD AUTO: 0.01 X10*3/UL (ref 0–0.7)
IMM GRANULOCYTES NFR BLD AUTO: 0.1 % (ref 0–0.9)
KETONES UR STRIP.AUTO-MCNC: NEGATIVE MG/DL
LACTATE SERPL-SCNC: 0.9 MMOL/L (ref 0.4–2)
LEUKOCYTE ESTERASE UR QL STRIP.AUTO: ABNORMAL
LYMPHOCYTES # BLD AUTO: 3.3 X10*3/UL (ref 1.2–4.8)
LYMPHOCYTES NFR BLD AUTO: 36.9 %
MCH RBC QN AUTO: 29.5 PG (ref 26–34)
MCHC RBC AUTO-ENTMCNC: 32.7 G/DL (ref 32–36)
MCV RBC AUTO: 90 FL (ref 80–100)
MONOCYTES # BLD AUTO: 0.58 X10*3/UL (ref 0.1–1)
MONOCYTES NFR BLD AUTO: 6.5 %
MUCOUS THREADS #/AREA URNS AUTO: ABNORMAL /LPF
NEUTROPHILS # BLD AUTO: 4.7 X10*3/UL (ref 1.2–7.7)
NEUTROPHILS NFR BLD AUTO: 52.5 %
NITRITE UR QL STRIP.AUTO: ABNORMAL
NRBC BLD-RTO: 0 /100 WBCS (ref 0–0)
PH UR STRIP.AUTO: 5.5 [PH]
PLATELET # BLD AUTO: 173 X10*3/UL (ref 150–450)
PROT UR STRIP.AUTO-MCNC: ABNORMAL MG/DL
RBC # BLD AUTO: 4.58 X10*6/UL (ref 4–5.2)
RBC # UR STRIP.AUTO: ABNORMAL MG/DL
RBC #/AREA URNS AUTO: ABNORMAL /HPF
SP GR UR STRIP.AUTO: 1.01
SQUAMOUS #/AREA URNS AUTO: ABNORMAL /HPF
UROBILINOGEN UR STRIP.AUTO-MCNC: NORMAL MG/DL
WBC # BLD AUTO: 9 X10*3/UL (ref 4.4–11.3)
WBC #/AREA URNS AUTO: >50 /HPF
WBC CLUMPS #/AREA URNS AUTO: ABNORMAL /HPF

## 2025-08-17 PROCEDURE — 83690 ASSAY OF LIPASE: CPT

## 2025-08-17 PROCEDURE — 83605 ASSAY OF LACTIC ACID: CPT

## 2025-08-17 PROCEDURE — 2500000001 HC RX 250 WO HCPCS SELF ADMINISTERED DRUGS (ALT 637 FOR MEDICARE OP): Performed by: EMERGENCY MEDICINE

## 2025-08-17 PROCEDURE — 85025 COMPLETE CBC W/AUTO DIFF WBC: CPT

## 2025-08-17 PROCEDURE — 99285 EMERGENCY DEPT VISIT HI MDM: CPT | Performed by: EMERGENCY MEDICINE

## 2025-08-17 PROCEDURE — 36415 COLL VENOUS BLD VENIPUNCTURE: CPT

## 2025-08-17 PROCEDURE — 87077 CULTURE AEROBIC IDENTIFY: CPT | Mod: AHULAB

## 2025-08-17 PROCEDURE — 81001 URINALYSIS AUTO W/SCOPE: CPT

## 2025-08-17 PROCEDURE — 80048 BASIC METABOLIC PNL TOTAL CA: CPT

## 2025-08-17 PROCEDURE — 82248 BILIRUBIN DIRECT: CPT

## 2025-08-17 PROCEDURE — 83735 ASSAY OF MAGNESIUM: CPT

## 2025-08-17 RX ORDER — CIPROFLOXACIN 500 MG/1
500 TABLET, FILM COATED ORAL 2 TIMES DAILY
Qty: 20 TABLET | Refills: 0 | Status: SHIPPED | OUTPATIENT
Start: 2025-08-17 | End: 2025-08-18

## 2025-08-17 RX ORDER — CIPROFLOXACIN 500 MG/1
500 TABLET, FILM COATED ORAL ONCE
Status: COMPLETED | OUTPATIENT
Start: 2025-08-17 | End: 2025-08-17

## 2025-08-17 RX ADMIN — CIPROFLOXACIN 500 MG: 500 TABLET ORAL at 19:23

## 2025-08-17 ASSESSMENT — PAIN SCALES - GENERAL: PAINLEVEL_OUTOF10: 5 - MODERATE PAIN

## 2025-08-17 ASSESSMENT — PAIN - FUNCTIONAL ASSESSMENT: PAIN_FUNCTIONAL_ASSESSMENT: 0-10

## 2025-08-18 ENCOUNTER — APPOINTMENT (OUTPATIENT)
Dept: RADIOLOGY | Facility: HOSPITAL | Age: 65
End: 2025-08-18
Payer: MEDICAID

## 2025-08-18 VITALS
RESPIRATION RATE: 14 BRPM | HEART RATE: 82 BPM | HEIGHT: 63 IN | TEMPERATURE: 98.8 F | OXYGEN SATURATION: 96 % | BODY MASS INDEX: 25.69 KG/M2 | WEIGHT: 145 LBS | DIASTOLIC BLOOD PRESSURE: 79 MMHG | SYSTOLIC BLOOD PRESSURE: 122 MMHG

## 2025-08-18 LAB
ALBUMIN SERPL BCP-MCNC: 3.5 G/DL (ref 3.4–5)
ALP SERPL-CCNC: 66 U/L (ref 33–136)
ALT SERPL W P-5'-P-CCNC: 17 U/L (ref 7–45)
ANION GAP SERPL CALC-SCNC: 14 MMOL/L (ref 10–20)
AST SERPL W P-5'-P-CCNC: 17 U/L (ref 9–39)
BILIRUB DIRECT SERPL-MCNC: 0.2 MG/DL (ref 0–0.3)
BILIRUB SERPL-MCNC: 0.7 MG/DL (ref 0–1.2)
BUN SERPL-MCNC: 7 MG/DL (ref 6–23)
CALCIUM SERPL-MCNC: 9 MG/DL (ref 8.6–10.3)
CHLORIDE SERPL-SCNC: 102 MMOL/L (ref 98–107)
CO2 SERPL-SCNC: 25 MMOL/L (ref 21–32)
CREAT SERPL-MCNC: 0.63 MG/DL (ref 0.5–1.05)
EGFRCR SERPLBLD CKD-EPI 2021: >90 ML/MIN/1.73M*2
GLUCOSE SERPL-MCNC: 85 MG/DL (ref 74–99)
LIPASE SERPL-CCNC: 8 U/L (ref 9–82)
MAGNESIUM SERPL-MCNC: 1.81 MG/DL (ref 1.6–2.4)
POTASSIUM SERPL-SCNC: 4.1 MMOL/L (ref 3.5–5.3)
PROT SERPL-MCNC: 5.1 G/DL (ref 6.4–8.2)
SODIUM SERPL-SCNC: 137 MMOL/L (ref 136–145)

## 2025-08-18 PROCEDURE — 74177 CT ABD & PELVIS W/CONTRAST: CPT | Performed by: RADIOLOGY

## 2025-08-18 PROCEDURE — 74177 CT ABD & PELVIS W/CONTRAST: CPT

## 2025-08-18 PROCEDURE — 2550000001 HC RX 255 CONTRASTS

## 2025-08-18 RX ORDER — CIPROFLOXACIN 500 MG/1
500 TABLET, FILM COATED ORAL 2 TIMES DAILY
Qty: 20 TABLET | Refills: 0 | Status: SHIPPED | OUTPATIENT
Start: 2025-08-18 | End: 2025-08-28

## 2025-08-18 RX ADMIN — IOHEXOL 75 ML: 350 INJECTION, SOLUTION INTRAVENOUS at 01:54

## 2025-08-20 ENCOUNTER — TREATMENT (OUTPATIENT)
Dept: OCCUPATIONAL THERAPY | Facility: HOSPITAL | Age: 65
End: 2025-08-20
Payer: MEDICAID

## 2025-08-20 DIAGNOSIS — M50.30 DDD (DEGENERATIVE DISC DISEASE), CERVICAL: ICD-10-CM

## 2025-08-20 LAB — BACTERIA UR CULT: ABNORMAL

## 2025-08-20 PROCEDURE — 97140 MANUAL THERAPY 1/> REGIONS: CPT | Mod: GO

## 2025-08-21 ENCOUNTER — RESULTS FOLLOW-UP (OUTPATIENT)
Dept: PHARMACY | Facility: HOSPITAL | Age: 65
End: 2025-08-21
Payer: MEDICAID

## 2025-08-22 ENCOUNTER — TELEPHONE (OUTPATIENT)
Dept: PRIMARY CARE | Facility: CLINIC | Age: 65
End: 2025-08-22
Payer: MEDICAID

## 2025-08-22 DIAGNOSIS — F41.9 ANXIETY: Primary | ICD-10-CM

## 2025-08-25 ENCOUNTER — APPOINTMENT (OUTPATIENT)
Dept: RHEUMATOLOGY | Facility: CLINIC | Age: 65
End: 2025-08-25
Payer: MEDICAID

## 2025-08-25 VITALS
WEIGHT: 145 LBS | DIASTOLIC BLOOD PRESSURE: 80 MMHG | HEART RATE: 78 BPM | SYSTOLIC BLOOD PRESSURE: 108 MMHG | HEIGHT: 63 IN | OXYGEN SATURATION: 96 % | BODY MASS INDEX: 25.69 KG/M2

## 2025-08-25 DIAGNOSIS — R20.0 NUMBNESS AND TINGLING IN RIGHT HAND: ICD-10-CM

## 2025-08-25 DIAGNOSIS — R20.2 NUMBNESS AND TINGLING IN RIGHT HAND: ICD-10-CM

## 2025-08-25 DIAGNOSIS — M35.01 SJOGREN'S SYNDROME WITH KERATOCONJUNCTIVITIS SICCA: ICD-10-CM

## 2025-08-25 DIAGNOSIS — R76.8 POSITIVE SM/RNP ANTIBODY: ICD-10-CM

## 2025-08-25 DIAGNOSIS — M05.79 RHEUMATOID ARTHRITIS INVOLVING MULTIPLE SITES WITH POSITIVE RHEUMATOID FACTOR (MULTI): Primary | ICD-10-CM

## 2025-08-25 PROCEDURE — 3008F BODY MASS INDEX DOCD: CPT | Performed by: INTERNAL MEDICINE

## 2025-08-25 PROCEDURE — 1036F TOBACCO NON-USER: CPT | Performed by: INTERNAL MEDICINE

## 2025-08-25 PROCEDURE — 3074F SYST BP LT 130 MM HG: CPT | Performed by: INTERNAL MEDICINE

## 2025-08-25 PROCEDURE — 99214 OFFICE O/P EST MOD 30 MIN: CPT | Performed by: INTERNAL MEDICINE

## 2025-08-25 PROCEDURE — 3079F DIAST BP 80-89 MM HG: CPT | Performed by: INTERNAL MEDICINE

## 2025-08-25 ASSESSMENT — PAIN SCALES - GENERAL: PAINLEVEL_OUTOF10: 0-NO PAIN

## 2025-08-26 ENCOUNTER — HOSPITAL ENCOUNTER (OUTPATIENT)
Dept: RADIOLOGY | Facility: HOSPITAL | Age: 65
Discharge: HOME | End: 2025-08-26
Payer: MEDICAID

## 2025-08-26 ENCOUNTER — OFFICE VISIT (OUTPATIENT)
Dept: ORTHOPEDIC SURGERY | Facility: HOSPITAL | Age: 65
End: 2025-08-26
Payer: MEDICAID

## 2025-08-26 DIAGNOSIS — M05.79 RHEUMATOID ARTHRITIS INVOLVING MULTIPLE SITES WITH POSITIVE RHEUMATOID FACTOR (MULTI): ICD-10-CM

## 2025-08-26 DIAGNOSIS — M17.11 PRIMARY OSTEOARTHRITIS OF RIGHT KNEE: Primary | ICD-10-CM

## 2025-08-26 PROCEDURE — 99212 OFFICE O/P EST SF 10 MIN: CPT

## 2025-08-26 PROCEDURE — 99203 OFFICE O/P NEW LOW 30 MIN: CPT | Performed by: STUDENT IN AN ORGANIZED HEALTH CARE EDUCATION/TRAINING PROGRAM

## 2025-08-26 PROCEDURE — 73564 X-RAY EXAM KNEE 4 OR MORE: CPT | Mod: RT

## 2025-08-26 PROCEDURE — 73564 X-RAY EXAM KNEE 4 OR MORE: CPT | Mod: RIGHT SIDE | Performed by: RADIOLOGY

## 2025-08-26 ASSESSMENT — PAIN SCALES - GENERAL: PAINLEVEL_OUTOF10: 4

## 2025-08-26 ASSESSMENT — PAIN DESCRIPTION - DESCRIPTORS: DESCRIPTORS: ACHING

## 2025-08-26 ASSESSMENT — PAIN - FUNCTIONAL ASSESSMENT: PAIN_FUNCTIONAL_ASSESSMENT: 0-10

## 2025-08-29 ENCOUNTER — TELEPHONE (OUTPATIENT)
Dept: PRIMARY CARE | Facility: CLINIC | Age: 65
End: 2025-08-29
Payer: MEDICAID

## 2025-08-29 DIAGNOSIS — F41.9 ANXIETY: Primary | ICD-10-CM

## 2025-08-29 RX ORDER — HYDROXYZINE PAMOATE 25 MG/1
25 CAPSULE ORAL 3 TIMES DAILY PRN
Qty: 30 CAPSULE | Refills: 0 | Status: SHIPPED | OUTPATIENT
Start: 2025-08-29 | End: 2025-09-08

## 2025-09-02 ENCOUNTER — TREATMENT (OUTPATIENT)
Dept: OCCUPATIONAL THERAPY | Facility: HOSPITAL | Age: 65
End: 2025-09-02
Payer: MEDICAID

## 2025-09-02 DIAGNOSIS — M50.30 DDD (DEGENERATIVE DISC DISEASE), CERVICAL: ICD-10-CM

## 2025-09-02 PROCEDURE — 97110 THERAPEUTIC EXERCISES: CPT | Mod: GO

## 2025-09-02 PROCEDURE — 97530 THERAPEUTIC ACTIVITIES: CPT | Mod: GO

## 2025-09-24 ENCOUNTER — APPOINTMENT (OUTPATIENT)
Dept: BEHAVIORAL HEALTH | Facility: CLINIC | Age: 65
End: 2025-09-24
Payer: MEDICAID

## 2026-01-15 ENCOUNTER — APPOINTMENT (OUTPATIENT)
Dept: DERMATOLOGY | Facility: CLINIC | Age: 66
End: 2026-01-15
Payer: MEDICAID

## 2026-02-25 ENCOUNTER — APPOINTMENT (OUTPATIENT)
Dept: RHEUMATOLOGY | Facility: CLINIC | Age: 66
End: 2026-02-25
Payer: MEDICAID

## (undated) DEVICE — KIT, MINOR, DOUBLE BASIN

## (undated) DEVICE — PAD, GROUNDING, ELECTROSURGICAL, W/9 FT CABLE, POLYHESIVE II, ADULT, LF

## (undated) DEVICE — SUTURE, VICRYL PLUS 3-0, SH, 27IN

## (undated) DEVICE — CONTROL KIT, REMOTE, ALPHA FREELINK

## (undated) DEVICE — TOWEL, SURGICAL, NEURO, O/R, 16 X 26, BLUE, STERILE

## (undated) DEVICE — DRAPE, LAPAROTOMY II, PEDIATRIC

## (undated) DEVICE — ADHESIVE, SKIN, LIQUIBAND EXCEED

## (undated) DEVICE — SYRINGE, 20 CC, LUER LOCK, MONOJECT, W/O CAP, LF

## (undated) DEVICE — APPLICATOR, CHLORAPREP, W/ORANGE TINT, 26ML

## (undated) DEVICE — WRENCH, HEX 7.6CM

## (undated) DEVICE — DRAPE, INCISE, ANTIMICROBIAL, IOBAN 2, LARGE, 17 X 23 IN, DISPOSABLE, STERILE

## (undated) DEVICE — SUTURE, PROLENE, 3-0, 30 IN, SH

## (undated) DEVICE — GLOVE, SURGICAL, PROTEXIS PI BLUE W/NEUTHERA, 6.5, PF, LF

## (undated) DEVICE — CAUTERY, PENCIL, PUSH BUTTON, SMOKE EVAC, 70MM

## (undated) DEVICE — Device

## (undated) DEVICE — SYRINGE, MONOJECT, LUER LOCK, 3 CC, LF

## (undated) DEVICE — SOLUTION, IRRIGATION, SODIUM CHLORIDE 0.9%, 1000 ML, POUR BOTTLE

## (undated) DEVICE — DRESSING, GAUZE, SPONGE, 8 PLY, CURITY, 2 X 2 IN, STERILE

## (undated) DEVICE — SUTURE, NUROLON, 0, 18 IN, CT1, DETACHABLE, MULTIPACK, BLACK

## (undated) DEVICE — GLOVE, SURGICAL, PROTEXIS PI BLUE W/NEUTHERA, 8.0, PF, LF

## (undated) DEVICE — ADHESIVE, SKIN, MASTISOL, 2/3 CC VIAL

## (undated) DEVICE — DRAPE COVER, C ARM, FLOUROSCAN IMAGING SYS

## (undated) DEVICE — COVER, C-ARM W/CLIPS, OEC GE

## (undated) DEVICE — SUTURE, MONOCRYL, 4-0, 18 IN, PS2, UNDYED

## (undated) DEVICE — KIT, CHARGING SPINAL CORD STIMULATOR

## (undated) DEVICE — LEAD, PRECISION BLANK

## (undated) DEVICE — NEEDLE, SAFETY, 21 G X 1.5 IN

## (undated) DEVICE — BAG, DECANTER

## (undated) DEVICE — DRAPE, INCISE, ANTIMICROBIAL, IOBAN 2, STERI DRAPE, 23 X 33 IN, DISPOSABLE, STERILE

## (undated) DEVICE — DRAPE, SHEET, ENDOSCOPY, GENERAL, FENESTRATED, ARMBOARD COVER, 98 X 123.5 IN, DISPOSABLE, LF, STERILE

## (undated) DEVICE — DRESSING, MOISTURE VAPOR PERMEABLE, TEGADERM, 1 3/4 X 1 3/4IN, TRANSPARENT

## (undated) DEVICE — DRAPE, SHEET, UTILITY, NON ABSORBENT, 18 X 26 IN, LF

## (undated) DEVICE — PENCIL, SMOKE EVACUATION, VALLEYLAB

## (undated) DEVICE — SYRINGE, GLASS, 5 CC, LUER LOK

## (undated) DEVICE — ELECTRODE, ELECTROSURGICAL, BLADE, INSULATED, ENT/IMA, STERILE

## (undated) DEVICE — DRAPE, SHEET, LAPAROTOMY, W/ISO-BAC, W/ARMBOARD COVERS, 98 X 122 IN, DISPOSABLE, LF, STERILE

## (undated) DEVICE — APPLICATOR, PREP, CHLORAPREP, W/ORANGE TINT, 10.5ML